# Patient Record
Sex: FEMALE | Race: WHITE | NOT HISPANIC OR LATINO | Employment: OTHER | URBAN - METROPOLITAN AREA
[De-identification: names, ages, dates, MRNs, and addresses within clinical notes are randomized per-mention and may not be internally consistent; named-entity substitution may affect disease eponyms.]

---

## 2017-05-08 ENCOUNTER — RECORDS - HEALTHEAST (OUTPATIENT)
Dept: LAB | Facility: CLINIC | Age: 77
End: 2017-05-08

## 2017-05-08 LAB
CHOLEST SERPL-MCNC: 159 MG/DL
FASTING STATUS PATIENT QL REPORTED: YES
HDLC SERPL-MCNC: 37 MG/DL
LDLC SERPL CALC-MCNC: 103 MG/DL
TRIGL SERPL-MCNC: 96 MG/DL

## 2018-07-02 ENCOUNTER — RECORDS - HEALTHEAST (OUTPATIENT)
Dept: LAB | Facility: CLINIC | Age: 78
End: 2018-07-02

## 2018-07-02 LAB
ALBUMIN SERPL-MCNC: 3.7 G/DL (ref 3.5–5)
ALP SERPL-CCNC: 57 U/L (ref 45–120)
ALT SERPL W P-5'-P-CCNC: 14 U/L (ref 0–45)
ANION GAP SERPL CALCULATED.3IONS-SCNC: 9 MMOL/L (ref 5–18)
AST SERPL W P-5'-P-CCNC: 20 U/L (ref 0–40)
BILIRUB SERPL-MCNC: 0.8 MG/DL (ref 0–1)
BUN SERPL-MCNC: 17 MG/DL (ref 8–28)
CALCIUM SERPL-MCNC: 9.2 MG/DL (ref 8.5–10.5)
CHLORIDE BLD-SCNC: 108 MMOL/L (ref 98–107)
CHOLEST SERPL-MCNC: 177 MG/DL
CO2 SERPL-SCNC: 25 MMOL/L (ref 22–31)
CREAT SERPL-MCNC: 0.77 MG/DL (ref 0.6–1.1)
FASTING STATUS PATIENT QL REPORTED: NORMAL
GFR SERPL CREATININE-BSD FRML MDRD: >60 ML/MIN/1.73M2
GLUCOSE BLD-MCNC: 90 MG/DL (ref 70–125)
HDLC SERPL-MCNC: 51 MG/DL
LDLC SERPL CALC-MCNC: 108 MG/DL
POTASSIUM BLD-SCNC: 3.9 MMOL/L (ref 3.5–5)
PROT SERPL-MCNC: 6.3 G/DL (ref 6–8)
SODIUM SERPL-SCNC: 142 MMOL/L (ref 136–145)
TRIGL SERPL-MCNC: 91 MG/DL
TSH SERPL DL<=0.005 MIU/L-ACNC: 0.97 UIU/ML (ref 0.3–5)

## 2018-07-03 LAB — 25(OH)D3 SERPL-MCNC: 20.7 NG/ML (ref 30–80)

## 2019-09-03 ENCOUNTER — RECORDS - HEALTHEAST (OUTPATIENT)
Dept: LAB | Facility: CLINIC | Age: 79
End: 2019-09-03

## 2019-09-03 LAB
ALBUMIN SERPL-MCNC: 3.7 G/DL (ref 3.5–5)
ALP SERPL-CCNC: 59 U/L (ref 45–120)
ALT SERPL W P-5'-P-CCNC: 15 U/L (ref 0–45)
ANION GAP SERPL CALCULATED.3IONS-SCNC: 8 MMOL/L (ref 5–18)
AST SERPL W P-5'-P-CCNC: 17 U/L (ref 0–40)
BILIRUB SERPL-MCNC: 0.8 MG/DL (ref 0–1)
BUN SERPL-MCNC: 20 MG/DL (ref 8–28)
CALCIUM SERPL-MCNC: 9.4 MG/DL (ref 8.5–10.5)
CHLORIDE BLD-SCNC: 107 MMOL/L (ref 98–107)
CHOLEST SERPL-MCNC: 186 MG/DL
CO2 SERPL-SCNC: 26 MMOL/L (ref 22–31)
CREAT SERPL-MCNC: 0.83 MG/DL (ref 0.6–1.1)
FASTING STATUS PATIENT QL REPORTED: YES
GFR SERPL CREATININE-BSD FRML MDRD: >60 ML/MIN/1.73M2
GLUCOSE BLD-MCNC: 93 MG/DL (ref 70–125)
HDLC SERPL-MCNC: 62 MG/DL
LDLC SERPL CALC-MCNC: 108 MG/DL
POTASSIUM BLD-SCNC: 4.3 MMOL/L (ref 3.5–5)
PROT SERPL-MCNC: 6.4 G/DL (ref 6–8)
SODIUM SERPL-SCNC: 141 MMOL/L (ref 136–145)
TRIGL SERPL-MCNC: 80 MG/DL
TSH SERPL DL<=0.005 MIU/L-ACNC: 0.7 UIU/ML (ref 0.3–5)

## 2019-09-04 LAB — 25(OH)D3 SERPL-MCNC: 20.4 NG/ML (ref 30–80)

## 2020-09-11 ENCOUNTER — RECORDS - HEALTHEAST (OUTPATIENT)
Dept: LAB | Facility: CLINIC | Age: 80
End: 2020-09-11

## 2020-09-11 LAB
ALBUMIN SERPL-MCNC: 3.7 G/DL (ref 3.5–5)
ALP SERPL-CCNC: 57 U/L (ref 45–120)
ALT SERPL W P-5'-P-CCNC: 17 U/L (ref 0–45)
ANION GAP SERPL CALCULATED.3IONS-SCNC: 12 MMOL/L (ref 5–18)
AST SERPL W P-5'-P-CCNC: 23 U/L (ref 0–40)
BILIRUB SERPL-MCNC: 0.8 MG/DL (ref 0–1)
BUN SERPL-MCNC: 18 MG/DL (ref 8–28)
CALCIUM SERPL-MCNC: 9.2 MG/DL (ref 8.5–10.5)
CHLORIDE BLD-SCNC: 107 MMOL/L (ref 98–107)
CHOLEST SERPL-MCNC: 182 MG/DL
CO2 SERPL-SCNC: 21 MMOL/L (ref 22–31)
CREAT SERPL-MCNC: 0.79 MG/DL (ref 0.6–1.1)
FASTING STATUS PATIENT QL REPORTED: NORMAL
GFR SERPL CREATININE-BSD FRML MDRD: >60 ML/MIN/1.73M2
GLUCOSE BLD-MCNC: 89 MG/DL (ref 70–125)
HDLC SERPL-MCNC: 57 MG/DL
LDLC SERPL CALC-MCNC: 107 MG/DL
POTASSIUM BLD-SCNC: 4.1 MMOL/L (ref 3.5–5)
PROT SERPL-MCNC: 6.7 G/DL (ref 6–8)
SODIUM SERPL-SCNC: 140 MMOL/L (ref 136–145)
TRIGL SERPL-MCNC: 91 MG/DL
TSH SERPL DL<=0.005 MIU/L-ACNC: 0.75 UIU/ML (ref 0.3–5)

## 2020-09-14 LAB — 25(OH)D3 SERPL-MCNC: 20.7 NG/ML (ref 30–80)

## 2021-05-25 ENCOUNTER — RECORDS - HEALTHEAST (OUTPATIENT)
Dept: ADMINISTRATIVE | Facility: CLINIC | Age: 81
End: 2021-05-25

## 2021-05-28 ENCOUNTER — RECORDS - HEALTHEAST (OUTPATIENT)
Dept: ADMINISTRATIVE | Facility: CLINIC | Age: 81
End: 2021-05-28

## 2021-05-30 ENCOUNTER — RECORDS - HEALTHEAST (OUTPATIENT)
Dept: ADMINISTRATIVE | Facility: CLINIC | Age: 81
End: 2021-05-30

## 2021-06-01 ENCOUNTER — RECORDS - HEALTHEAST (OUTPATIENT)
Dept: ADMINISTRATIVE | Facility: CLINIC | Age: 81
End: 2021-06-01

## 2021-09-02 ENCOUNTER — APPOINTMENT (OUTPATIENT)
Dept: MRI IMAGING | Facility: HOSPITAL | Age: 81
End: 2021-09-02
Attending: EMERGENCY MEDICINE
Payer: COMMERCIAL

## 2021-09-02 ENCOUNTER — APPOINTMENT (OUTPATIENT)
Dept: RADIOLOGY | Facility: HOSPITAL | Age: 81
End: 2021-09-02
Attending: EMERGENCY MEDICINE
Payer: COMMERCIAL

## 2021-09-02 ENCOUNTER — HOSPITAL ENCOUNTER (OUTPATIENT)
Facility: HOSPITAL | Age: 81
Setting detail: OBSERVATION
Discharge: HOME | End: 2021-09-03
Attending: EMERGENCY MEDICINE | Admitting: EMERGENCY MEDICINE
Payer: COMMERCIAL

## 2021-09-02 DIAGNOSIS — R29.898 LEFT LEG WEAKNESS: ICD-10-CM

## 2021-09-02 DIAGNOSIS — C90.00 MULTIPLE MYELOMA NOT HAVING ACHIEVED REMISSION (H): ICD-10-CM

## 2021-09-02 DIAGNOSIS — R52 INTRACTABLE PAIN: ICD-10-CM

## 2021-09-02 LAB
ALBUMIN SERPL-MCNC: 2.9 G/DL (ref 3.5–5)
ALBUMIN UR-MCNC: 10 MG/DL
ALP SERPL-CCNC: 98 U/L (ref 45–120)
ALT SERPL W P-5'-P-CCNC: 27 U/L (ref 0–45)
ANION GAP SERPL CALCULATED.3IONS-SCNC: 6 MMOL/L (ref 5–18)
APPEARANCE UR: CLEAR
APTT PPP: 30 SECONDS (ref 22–38)
AST SERPL W P-5'-P-CCNC: 35 U/L (ref 0–40)
BASOPHILS # BLD AUTO: 0 10E3/UL (ref 0–0.2)
BASOPHILS NFR BLD AUTO: 0 %
BILIRUB SERPL-MCNC: 0.4 MG/DL (ref 0–1)
BILIRUB UR QL STRIP: NEGATIVE
BNP SERPL-MCNC: 121 PG/ML (ref 0–155)
BUN SERPL-MCNC: 13 MG/DL (ref 8–28)
CALCIUM SERPL-MCNC: 10.4 MG/DL (ref 8.5–10.5)
CHLORIDE BLD-SCNC: 106 MMOL/L (ref 98–107)
CO2 SERPL-SCNC: 23 MMOL/L (ref 22–31)
COLOR UR AUTO: ABNORMAL
CREAT SERPL-MCNC: 0.74 MG/DL (ref 0.6–1.1)
EOSINOPHIL # BLD AUTO: 0 10E3/UL (ref 0–0.7)
EOSINOPHIL NFR BLD AUTO: 0 %
ERYTHROCYTE [DISTWIDTH] IN BLOOD BY AUTOMATED COUNT: 14.4 % (ref 10–15)
GFR SERPL CREATININE-BSD FRML MDRD: 77 ML/MIN/1.73M2
GLUCOSE BLD-MCNC: 141 MG/DL (ref 70–125)
GLUCOSE UR STRIP-MCNC: NEGATIVE MG/DL
HCT VFR BLD AUTO: 32.8 % (ref 35–47)
HGB BLD-MCNC: 10.9 G/DL (ref 11.7–15.7)
HGB UR QL STRIP: ABNORMAL
IMM GRANULOCYTES # BLD: 0 10E3/UL
IMM GRANULOCYTES NFR BLD: 1 %
INR PPP: 1.34 (ref 0.85–1.15)
KETONES UR STRIP-MCNC: 10 MG/DL
LACTATE SERPL-SCNC: 1.3 MMOL/L (ref 0.7–2)
LEUKOCYTE ESTERASE UR QL STRIP: NEGATIVE
LYMPHOCYTES # BLD AUTO: 0.3 10E3/UL (ref 0.8–5.3)
LYMPHOCYTES NFR BLD AUTO: 6 %
MCH RBC QN AUTO: 33.5 PG (ref 26.5–33)
MCHC RBC AUTO-ENTMCNC: 33.2 G/DL (ref 31.5–36.5)
MCV RBC AUTO: 101 FL (ref 78–100)
MONOCYTES # BLD AUTO: 0.2 10E3/UL (ref 0–1.3)
MONOCYTES NFR BLD AUTO: 5 %
MUCOUS THREADS #/AREA URNS LPF: PRESENT /LPF
NEUTROPHILS # BLD AUTO: 3.8 10E3/UL (ref 1.6–8.3)
NEUTROPHILS NFR BLD AUTO: 88 %
NITRATE UR QL: NEGATIVE
NRBC # BLD AUTO: 0 10E3/UL
NRBC BLD AUTO-RTO: 0 /100
PH UR STRIP: 7 [PH] (ref 5–7)
PLATELET # BLD AUTO: 104 10E3/UL (ref 150–450)
POTASSIUM BLD-SCNC: 3.2 MMOL/L (ref 3.5–5)
PROT SERPL-MCNC: 8.8 G/DL (ref 6–8)
RBC # BLD AUTO: 3.25 10E6/UL (ref 3.8–5.2)
RBC URINE: 16 /HPF
SARS-COV-2 RNA RESP QL NAA+PROBE: NEGATIVE
SODIUM SERPL-SCNC: 135 MMOL/L (ref 136–145)
SP GR UR STRIP: 1.01 (ref 1–1.03)
SQUAMOUS EPITHELIAL: <1 /HPF
TROPONIN I SERPL-MCNC: 0.03 NG/ML (ref 0–0.29)
UROBILINOGEN UR STRIP-MCNC: <2 MG/DL
WBC # BLD AUTO: 4.3 10E3/UL (ref 4–11)
WBC URINE: 4 /HPF

## 2021-09-02 PROCEDURE — 72148 MRI LUMBAR SPINE W/O DYE: CPT

## 2021-09-02 PROCEDURE — 99285 EMERGENCY DEPT VISIT HI MDM: CPT | Mod: 25

## 2021-09-02 PROCEDURE — 84484 ASSAY OF TROPONIN QUANT: CPT | Performed by: EMERGENCY MEDICINE

## 2021-09-02 PROCEDURE — G0378 HOSPITAL OBSERVATION PER HR: HCPCS

## 2021-09-02 PROCEDURE — 85610 PROTHROMBIN TIME: CPT | Performed by: EMERGENCY MEDICINE

## 2021-09-02 PROCEDURE — 83605 ASSAY OF LACTIC ACID: CPT | Performed by: EMERGENCY MEDICINE

## 2021-09-02 PROCEDURE — 81001 URINALYSIS AUTO W/SCOPE: CPT | Performed by: EMERGENCY MEDICINE

## 2021-09-02 PROCEDURE — 87635 SARS-COV-2 COVID-19 AMP PRB: CPT | Performed by: EMERGENCY MEDICINE

## 2021-09-02 PROCEDURE — 36415 COLL VENOUS BLD VENIPUNCTURE: CPT | Performed by: EMERGENCY MEDICINE

## 2021-09-02 PROCEDURE — 80053 COMPREHEN METABOLIC PANEL: CPT | Performed by: EMERGENCY MEDICINE

## 2021-09-02 PROCEDURE — 71045 X-RAY EXAM CHEST 1 VIEW: CPT

## 2021-09-02 PROCEDURE — 85025 COMPLETE CBC W/AUTO DIFF WBC: CPT | Performed by: EMERGENCY MEDICINE

## 2021-09-02 PROCEDURE — 83735 ASSAY OF MAGNESIUM: CPT | Performed by: INTERNAL MEDICINE

## 2021-09-02 PROCEDURE — 83880 ASSAY OF NATRIURETIC PEPTIDE: CPT | Performed by: EMERGENCY MEDICINE

## 2021-09-02 PROCEDURE — 85730 THROMBOPLASTIN TIME PARTIAL: CPT | Performed by: EMERGENCY MEDICINE

## 2021-09-02 PROCEDURE — 93005 ELECTROCARDIOGRAM TRACING: CPT | Performed by: EMERGENCY MEDICINE

## 2021-09-02 PROCEDURE — C9803 HOPD COVID-19 SPEC COLLECT: HCPCS

## 2021-09-02 PROCEDURE — 99220 PR INITIAL OBSERVATION CARE,LEVEL III: CPT | Performed by: INTERNAL MEDICINE

## 2021-09-02 PROCEDURE — 258N000003 HC RX IP 258 OP 636: Performed by: INTERNAL MEDICINE

## 2021-09-02 PROCEDURE — 258N000003 HC RX IP 258 OP 636: Performed by: EMERGENCY MEDICINE

## 2021-09-02 RX ORDER — ONDANSETRON 2 MG/ML
4 INJECTION INTRAMUSCULAR; INTRAVENOUS EVERY 6 HOURS PRN
Status: DISCONTINUED | OUTPATIENT
Start: 2021-09-02 | End: 2021-09-03 | Stop reason: HOSPADM

## 2021-09-02 RX ORDER — BRIMONIDINE TARTRATE 2 MG/ML
1 SOLUTION/ DROPS OPHTHALMIC 2 TIMES DAILY
Status: DISCONTINUED | OUTPATIENT
Start: 2021-09-03 | End: 2021-09-03

## 2021-09-02 RX ORDER — ONDANSETRON 4 MG/1
4 TABLET, ORALLY DISINTEGRATING ORAL EVERY 6 HOURS PRN
Status: DISCONTINUED | OUTPATIENT
Start: 2021-09-02 | End: 2021-09-03 | Stop reason: HOSPADM

## 2021-09-02 RX ORDER — ACETAMINOPHEN 650 MG/1
650 SUPPOSITORY RECTAL EVERY 6 HOURS PRN
Status: DISCONTINUED | OUTPATIENT
Start: 2021-09-02 | End: 2021-09-03 | Stop reason: HOSPADM

## 2021-09-02 RX ORDER — ACETAMINOPHEN 325 MG/1
650 TABLET ORAL EVERY 6 HOURS PRN
Status: DISCONTINUED | OUTPATIENT
Start: 2021-09-02 | End: 2021-09-03 | Stop reason: HOSPADM

## 2021-09-02 RX ORDER — NALOXONE HYDROCHLORIDE 0.4 MG/ML
0.2 INJECTION, SOLUTION INTRAMUSCULAR; INTRAVENOUS; SUBCUTANEOUS
Status: DISCONTINUED | OUTPATIENT
Start: 2021-09-02 | End: 2021-09-03 | Stop reason: HOSPADM

## 2021-09-02 RX ORDER — CHOLESTYRAMINE 4 G/9G
1 POWDER, FOR SUSPENSION ORAL 2 TIMES DAILY
Status: DISCONTINUED | OUTPATIENT
Start: 2021-09-03 | End: 2021-09-03 | Stop reason: HOSPADM

## 2021-09-02 RX ORDER — NALOXONE HYDROCHLORIDE 0.4 MG/ML
0.4 INJECTION, SOLUTION INTRAMUSCULAR; INTRAVENOUS; SUBCUTANEOUS
Status: DISCONTINUED | OUTPATIENT
Start: 2021-09-02 | End: 2021-09-03 | Stop reason: HOSPADM

## 2021-09-02 RX ORDER — AMOXICILLIN 250 MG
1 CAPSULE ORAL 2 TIMES DAILY PRN
Status: DISCONTINUED | OUTPATIENT
Start: 2021-09-02 | End: 2021-09-03 | Stop reason: HOSPADM

## 2021-09-02 RX ORDER — VITAMIN B COMPLEX
1000 TABLET ORAL DAILY
Status: DISCONTINUED | OUTPATIENT
Start: 2021-09-03 | End: 2021-09-03 | Stop reason: HOSPADM

## 2021-09-02 RX ORDER — DIPHENOXYLATE HCL/ATROPINE 2.5-.025MG
1 TABLET ORAL 4 TIMES DAILY PRN
COMMUNITY
End: 2022-01-17

## 2021-09-02 RX ORDER — GABAPENTIN 300 MG/1
300 CAPSULE ORAL 2 TIMES DAILY
Status: DISCONTINUED | OUTPATIENT
Start: 2021-09-02 | End: 2021-09-03 | Stop reason: HOSPADM

## 2021-09-02 RX ORDER — LOPERAMIDE HCL 2 MG
2 CAPSULE ORAL DAILY PRN
Status: DISCONTINUED | OUTPATIENT
Start: 2021-09-02 | End: 2021-09-03 | Stop reason: HOSPADM

## 2021-09-02 RX ORDER — AMOXICILLIN 250 MG
2 CAPSULE ORAL 2 TIMES DAILY PRN
Status: DISCONTINUED | OUTPATIENT
Start: 2021-09-02 | End: 2021-09-03 | Stop reason: HOSPADM

## 2021-09-02 RX ORDER — DORZOLAMIDE HYDROCHLORIDE AND TIMOLOL MALEATE 20; 5 MG/ML; MG/ML
1 SOLUTION/ DROPS OPHTHALMIC 2 TIMES DAILY
COMMUNITY

## 2021-09-02 RX ORDER — SODIUM CHLORIDE 9 MG/ML
INJECTION, SOLUTION INTRAVENOUS CONTINUOUS
Status: DISCONTINUED | OUTPATIENT
Start: 2021-09-02 | End: 2021-09-03 | Stop reason: ALTCHOICE

## 2021-09-02 RX ORDER — CHOLESTYRAMINE LIGHT 4 G/5.7G
1 POWDER, FOR SUSPENSION ORAL
Status: DISCONTINUED | OUTPATIENT
Start: 2021-09-02 | End: 2021-09-02 | Stop reason: CLARIF

## 2021-09-02 RX ORDER — FENTANYL CITRATE 50 UG/ML
50 INJECTION, SOLUTION INTRAMUSCULAR; INTRAVENOUS ONCE
Status: DISCONTINUED | OUTPATIENT
Start: 2021-09-02 | End: 2021-09-02

## 2021-09-02 RX ORDER — TRAMADOL HYDROCHLORIDE 50 MG/1
50 TABLET ORAL AT BEDTIME
Status: DISCONTINUED | OUTPATIENT
Start: 2021-09-02 | End: 2021-09-03 | Stop reason: HOSPADM

## 2021-09-02 RX ORDER — ASPIRIN 81 MG/1
81 TABLET, CHEWABLE ORAL AT BEDTIME
Status: DISCONTINUED | OUTPATIENT
Start: 2021-09-02 | End: 2021-09-03 | Stop reason: HOSPADM

## 2021-09-02 RX ORDER — LEVOTHYROXINE SODIUM 100 UG/1
100 TABLET ORAL
Status: DISCONTINUED | OUTPATIENT
Start: 2021-09-03 | End: 2021-09-03 | Stop reason: HOSPADM

## 2021-09-02 RX ORDER — DORZOLAMIDE HYDROCHLORIDE AND TIMOLOL MALEATE 20; 5 MG/ML; MG/ML
1 SOLUTION/ DROPS OPHTHALMIC 2 TIMES DAILY
Status: DISCONTINUED | OUTPATIENT
Start: 2021-09-03 | End: 2021-09-03 | Stop reason: HOSPADM

## 2021-09-02 RX ORDER — LOPERAMIDE HCL 2 MG
2 CAPSULE ORAL 4 TIMES DAILY PRN
COMMUNITY

## 2021-09-02 RX ORDER — LOPERAMIDE HCL 2 MG
2 CAPSULE ORAL EVERY MORNING
COMMUNITY
End: 2022-01-17

## 2021-09-02 RX ORDER — ACETAMINOPHEN 325 MG/1
650 TABLET ORAL 3 TIMES DAILY
Status: DISCONTINUED | OUTPATIENT
Start: 2021-09-03 | End: 2021-09-03 | Stop reason: HOSPADM

## 2021-09-02 RX ORDER — ACETAMINOPHEN 325 MG/1
650 TABLET ORAL EVERY 8 HOURS PRN
Status: ON HOLD | COMMUNITY
End: 2022-02-19

## 2021-09-02 RX ORDER — POTASSIUM CHLORIDE 750 MG/1
10 TABLET, EXTENDED RELEASE ORAL
Status: DISCONTINUED | OUTPATIENT
Start: 2021-09-03 | End: 2021-09-03 | Stop reason: HOSPADM

## 2021-09-02 RX ORDER — DIPHENOXYLATE HCL/ATROPINE 2.5-.025MG
1 TABLET ORAL 4 TIMES DAILY PRN
Status: DISCONTINUED | OUTPATIENT
Start: 2021-09-02 | End: 2021-09-03 | Stop reason: HOSPADM

## 2021-09-02 RX ADMIN — SODIUM CHLORIDE: 9 INJECTION, SOLUTION INTRAVENOUS at 19:58

## 2021-09-02 RX ADMIN — SODIUM CHLORIDE: 9 INJECTION, SOLUTION INTRAVENOUS at 23:25

## 2021-09-02 ASSESSMENT — ENCOUNTER SYMPTOMS
SHORTNESS OF BREATH: 0
FEVER: 0
NECK STIFFNESS: 0
DIFFICULTY URINATING: 0
ARTHRALGIAS: 0
BACK PAIN: 1
EYE REDNESS: 0
CONFUSION: 0
HEADACHES: 0
ABDOMINAL PAIN: 0
COLOR CHANGE: 0

## 2021-09-02 ASSESSMENT — MIFFLIN-ST. JEOR: SCORE: 999.32

## 2021-09-02 NOTE — ED TRIAGE NOTES
Patient is having weakness and pain in her lower back and left leg.  She has recently restarted Chemo for multiple myeloma. She was for to have oxygen saturations of 85% on room air and she was given 2 liters nasal canula.  Temp.at 100.00 's.  Patient is blind. Arrival via EMS [facility]..   .

## 2021-09-03 VITALS
TEMPERATURE: 97.6 F | DIASTOLIC BLOOD PRESSURE: 57 MMHG | HEIGHT: 64 IN | SYSTOLIC BLOOD PRESSURE: 93 MMHG | WEIGHT: 120 LBS | HEART RATE: 68 BPM | RESPIRATION RATE: 16 BRPM | BODY MASS INDEX: 20.49 KG/M2 | OXYGEN SATURATION: 95 %

## 2021-09-03 LAB
ATRIAL RATE - MUSE: 106 BPM
CREAT SERPL-MCNC: 0.66 MG/DL (ref 0.6–1.1)
DIASTOLIC BLOOD PRESSURE - MUSE: NORMAL MMHG
GFR SERPL CREATININE-BSD FRML MDRD: 84 ML/MIN/1.73M2
INTERPRETATION ECG - MUSE: NORMAL
MAGNESIUM SERPL-MCNC: 1.6 MG/DL (ref 1.8–2.6)
P AXIS - MUSE: 59 DEGREES
POTASSIUM BLD-SCNC: 3.4 MMOL/L (ref 3.5–5)
POTASSIUM BLD-SCNC: 4.3 MMOL/L (ref 3.5–5)
PR INTERVAL - MUSE: 162 MS
QRS DURATION - MUSE: 120 MS
QT - MUSE: 336 MS
QTC - MUSE: 446 MS
R AXIS - MUSE: 99 DEGREES
SYSTOLIC BLOOD PRESSURE - MUSE: NORMAL MMHG
T AXIS - MUSE: 32 DEGREES
VENTRICULAR RATE- MUSE: 106 BPM

## 2021-09-03 PROCEDURE — 84132 ASSAY OF SERUM POTASSIUM: CPT | Performed by: INTERNAL MEDICINE

## 2021-09-03 PROCEDURE — 258N000003 HC RX IP 258 OP 636: Performed by: INTERNAL MEDICINE

## 2021-09-03 PROCEDURE — 84132 ASSAY OF SERUM POTASSIUM: CPT | Mod: 91 | Performed by: INTERNAL MEDICINE

## 2021-09-03 PROCEDURE — 250N000011 HC RX IP 250 OP 636: Performed by: INTERNAL MEDICINE

## 2021-09-03 PROCEDURE — 250N000013 HC RX MED GY IP 250 OP 250 PS 637: Performed by: INTERNAL MEDICINE

## 2021-09-03 PROCEDURE — G0378 HOSPITAL OBSERVATION PER HR: HCPCS

## 2021-09-03 PROCEDURE — 36415 COLL VENOUS BLD VENIPUNCTURE: CPT | Performed by: INTERNAL MEDICINE

## 2021-09-03 PROCEDURE — 99217 PR OBSERVATION CARE DISCHARGE: CPT | Performed by: INTERNAL MEDICINE

## 2021-09-03 PROCEDURE — 82565 ASSAY OF CREATININE: CPT | Performed by: INTERNAL MEDICINE

## 2021-09-03 PROCEDURE — 96372 THER/PROPH/DIAG INJ SC/IM: CPT | Performed by: INTERNAL MEDICINE

## 2021-09-03 RX ORDER — POTASSIUM CHLORIDE 1500 MG/1
20 TABLET, EXTENDED RELEASE ORAL ONCE
Status: COMPLETED | OUTPATIENT
Start: 2021-09-03 | End: 2021-09-03

## 2021-09-03 RX ORDER — BRIMONIDINE TARTRATE 1 MG/ML
1 SOLUTION/ DROPS OPHTHALMIC 2 TIMES DAILY
Status: DISCONTINUED | OUTPATIENT
Start: 2021-09-03 | End: 2021-09-03 | Stop reason: HOSPADM

## 2021-09-03 RX ORDER — TRAMADOL HYDROCHLORIDE 50 MG/1
25 TABLET ORAL 2 TIMES DAILY PRN
Qty: 10 TABLET | Refills: 0 | Status: SHIPPED | OUTPATIENT
Start: 2021-09-03 | End: 2022-01-17

## 2021-09-03 RX ORDER — POTASSIUM CHLORIDE 1.5 G/1.58G
20 POWDER, FOR SOLUTION ORAL ONCE
Status: COMPLETED | OUTPATIENT
Start: 2021-09-03 | End: 2021-09-03

## 2021-09-03 RX ADMIN — CHOLESTYRAMINE 4 G: 4 POWDER, FOR SUSPENSION ORAL at 10:03

## 2021-09-03 RX ADMIN — ENOXAPARIN SODIUM 30 MG: 30 INJECTION SUBCUTANEOUS at 03:18

## 2021-09-03 RX ADMIN — TRAMADOL HYDROCHLORIDE 50 MG: 50 TABLET, FILM COATED ORAL at 00:09

## 2021-09-03 RX ADMIN — POTASSIUM CHLORIDE FOR ORAL SOLUTION 20 MEQ: 1.5 POWDER, FOR SOLUTION ORAL at 10:03

## 2021-09-03 RX ADMIN — BRIMONIDINE TARTRATE 1 DROP: 1 SOLUTION/ DROPS OPHTHALMIC at 13:16

## 2021-09-03 RX ADMIN — ACETAMINOPHEN 650 MG: 325 TABLET ORAL at 10:03

## 2021-09-03 RX ADMIN — POTASSIUM CHLORIDE 10 MEQ: 750 TABLET, EXTENDED RELEASE ORAL at 10:03

## 2021-09-03 RX ADMIN — ACETAMINOPHEN 650 MG: 325 TABLET ORAL at 00:08

## 2021-09-03 RX ADMIN — Medication 400 UNITS: at 10:03

## 2021-09-03 RX ADMIN — DIPHENOXYLATE HYDROCHLORIDE AND ATROPINE SULFATE 1 TABLET: 2.5; .025 TABLET ORAL at 10:37

## 2021-09-03 RX ADMIN — POTASSIUM CHLORIDE 20 MEQ: 1500 TABLET, EXTENDED RELEASE ORAL at 03:13

## 2021-09-03 RX ADMIN — LEVOTHYROXINE SODIUM 100 MCG: 0.1 TABLET ORAL at 06:42

## 2021-09-03 RX ADMIN — ASPIRIN 81 MG: 81 TABLET, CHEWABLE ORAL at 00:08

## 2021-09-03 RX ADMIN — SODIUM CHLORIDE: 9 INJECTION, SOLUTION INTRAVENOUS at 11:49

## 2021-09-03 RX ADMIN — Medication 1000 UNITS: at 10:11

## 2021-09-03 RX ADMIN — GABAPENTIN 300 MG: 300 CAPSULE ORAL at 10:03

## 2021-09-03 RX ADMIN — GABAPENTIN 300 MG: 300 CAPSULE ORAL at 00:09

## 2021-09-03 RX ADMIN — ACETAMINOPHEN 650 MG: 325 TABLET ORAL at 14:48

## 2021-09-03 ASSESSMENT — ACTIVITIES OF DAILY LIVING (ADL): DEPENDENT_IADLS:: INDEPENDENT

## 2021-09-03 NOTE — ED NOTES
"[facility] ED Handoff Report    ED Chief Complaint:  chief complaint    ED Diagnosis:  (C90.00) Multiple myeloma not having achieved remission (H)  Comment:   Plan:     (R29.898) Left leg weakness  Comment:   Plan:     (R52) Intractable pain  Comment:   Plan:        PMH:  History reviewed. No pertinent past medical history.     Code Status:  No Order     Falls Risk: Yes- last fall in april    Current Living Situation/Residence: house  With sister    Elimination Status:  Continent - short warning before she has to go.     Activity Level:  Standby with 1 assist and walker- pt is legally blind    Patients Preferred Language:  English     Needed: No    Infection:  [unfilled]     Vital Signs:  BP 99/67   Pulse 88   Temp 100  F (37.8  C) (Oral)   Resp 16   Ht 1.626 m (5' 4\")   Wt 54.4 kg (120 lb)   SpO2 94%   BMI 20.60 kg/m       Cardiac Rhythm: n/a    Pain Score:  1/10    Is the Patient Confused:  No    Last Food or Drink: drinking water in ER. Had boost at 1530    Focused Assessment:  Pt c/o left leg weakness.     Tests Performed:  Xray, labs, mri    Abnormal Results:    Abnormal Labs Reviewed   INR - Abnormal; Notable for the following components:       Result Value    INR 1.34 (*)     All other components within normal limits   COMPREHENSIVE METABOLIC PANEL - Abnormal; Notable for the following components:    Sodium 135 (*)     Potassium 3.2 (*)     Glucose 141 (*)     Protein Total 8.8 (*)     Albumin 2.9 (*)     All other components within normal limits   ROUTINE UA WITH MICROSCOPIC REFLEX TO CULTURE - Abnormal; Notable for the following components:    Ketones Urine 10  (*)     Blood Urine 0.2 mg/dL (*)     Protein Albumin Urine 10  (*)     Mucus Urine Present (*)     RBC Urine 16 (*)     All other components within normal limits    Narrative:     Urine Culture not indicated   CBC WITH PLATELETS AND DIFFERENTIAL - Abnormal; Notable for the following components:    RBC Count 3.25 (*)     " Hemoglobin 10.9 (*)     Hematocrit 32.8 (*)      (*)     MCH 33.5 (*)     Platelet Count 104 (*)     Absolute Lymphocytes 0.3 (*)     All other components within normal limits       XR Chest Port 1 View   Final Result   IMPRESSION: Heart size is normal. Vague focus of airspace opacification in the right infrahilar region could reflect atelectasis or a developing infiltrate. Lungs otherwise clear. No visible pneumothorax.      MR Lumbar Spine w/o Contrast   Final Result   IMPRESSION:   1.  Slight progression of sacral marrow replacing process with encroachment upon the mid sacral plexus. There is also interval development of an adjacent pelvic soft tissue mass which is incompletely visualized seen on sagittal images 4-12 of series 5    and 6.      2.  Thoracic and lumbar compression fractures with progression since the prior study. Varying ages. No evidence for thoracic or lumbar epidural soft tissue mass or paraspinous tissue.      3.  Moderate to severe, multilevel lumbar spondylosis.      4.  Focal marrow replacing lesion within the left femoral head.      XR Chest 2 Views    (Results Pending)        Treatments Provided:  See MAR    Family Dynamics/Concerns: no    Family Updated On Visitor Policy: Yes    Plan of Care Communicated to Family: Yes    Who Was Updated about Plan of Care: son    Belongings Checklist Done and Signed by Patient: Yes    covid- came back negative    ED Medications:    Medications   sodium chloride 0.9% infusion ( Intravenous New Bag 9/2/21 1958)   fentaNYL (PF) (SUBLIMAZE) injection 50 mcg (0 mcg Intravenous Hold 9/2/21 2040)        Additional Information: n/america Hawley RN 9/2/2021 10:30 PM

## 2021-09-03 NOTE — DISCHARGE SUMMARY
"[facility] MEDICINE  DISCHARGE SUMMARY     Primary Care Physician: Roland Henriquez  Admission Date: 9/2/2021   Discharge Provider: Megan Hernandez MD Discharge Date: 9/3/2021   Diet:   Active Diet and Nourishment Order   Procedures     Advance Diet as Tolerated: Regular Diet Adult     Diet       Code Status: Full Code   Activity: DCACTIVITY: Activity as tolerated        Condition at Discharge: Stable     REASON FOR PRESENTATION(See Admission Note for Details)   Leg weakness    PRINCIPAL & ACTIVE DISCHARGE DIAGNOSES     Active Problems:    Multiple myeloma (H)    Left leg weakness    Intractable pain      PENDING LABS     Unresulted Labs Ordered in the Past 30 Days of this Admission     No orders found for last 31 day(s).            PROCEDURES ( this hospitalization only)          RECOMMENDATIONS TO OUTPATIENT PROVIDER FOR F/U VISIT     Follow-up Appointments     Follow-up and recommended labs and tests       Oncology arranged follow-up with them next week for further management                 DISPOSITION     Home    SUMMARY OF HOSPITAL COURSE:      Amelia Fry is a 81 year old female presents with      Back pain and leg pain  Left leg weakness  -MRI showing \"Slight progression of sacral marrow replacing process with encroachment upon the mid sacral plexus.\"  -MRI showing \"Thoracic and lumbar compression fractures with progression\"  -\"Focal marrow replacing lesion within the left femoral head.\"  -pain controlled, did prescribe additional tramadol 25mg BID prn   - continue tramadol 50mg daily  - patient declined, pain team, PT/Ot consults      MM  -recent radiation and chemotherapy  -oncology consultation  - f/u with oncology this week      Hypokalemia  - replaced      Hyponatremia  -ns    Discharge Medications with Med changes:     Current Discharge Medication List      START taking these medications    Details   !! traMADol (ULTRAM) 50 MG tablet Take 0.5 tablets " (25 mg) by mouth 2 times daily as needed for breakthrough pain  Qty: 10 tablet, Refills: 0    Associated Diagnoses: Multiple myeloma not having achieved remission (H); Intractable pain       !! - Potential duplicate medications found. Please discuss with provider.      CONTINUE these medications which have NOT CHANGED    Details   acetaminophen (TYLENOL) 325 MG tablet Take 650 mg by mouth 3 times daily      aspirin 81 mg chewable tablet Take 81 mg by mouth At Bedtime       brimonidine (ALPHAGAN P) 0.1 % ophthalmic solution Place 1 drop into the right eye 2 times daily       cholecalciferol 25 MCG (1000 UT) TABS Take 1,000 Units by mouth daily      cholestyramine-aspartame, sugar free, (CHOLESTYRAMINE LIGHT) 4 gram PwPk [CHOLESTYRAMINE-ASPARTAME, SUGAR FREE, (CHOLESTYRAMINE LIGHT) 4 GRAM PWPK] Take 1 packet by mouth daily after supper.      diphenoxylate-atropine (LOMOTIL) 2.5-0.025 MG tablet Take 1 tablet by mouth 4 times daily as needed for diarrhea For persistent diarrhea not controlled with Lomotil      dorzolamide-timolol (COSOPT) 2-0.5 % ophthalmic solution Place 1 drop into the right eye 2 times daily      gabapentin (NEURONTIN) 300 MG capsule Take 300 mg by mouth 2 times daily       levothyroxine (SYNTHROID, LEVOTHROID) 100 MCG tablet Take 100 mcg by mouth daily before breakfast       !! loperamide (IMODIUM) 2 MG capsule Take 2 mg by mouth every morning      !! loperamide (IMODIUM) 2 MG capsule Take 2 mg by mouth daily as needed for diarrhea (second dose if needed)      MELALEUCA SC Take by mouth daily Melaleuca vitamin supplements containing - Multivitamin, minerals, calcium      netarsudil (RHOPRESSA) 0.02 % ophthalmic solution Place 1 drop into the right eye At Bedtime      potassium chloride SA (K-DUR,KLOR-CON) 10 MEQ tablet Take 10 mEq by mouth two times daily With food      tafluprost, PF, (ZIOPTAN, PF,) 0.0015 % Dpet ophthalmic dropperette Place 1 drop into the right eye At Bedtime       !! traMADol  (ULTRAM) 50 mg tablet Take 50 mg by mouth At Bedtime       vitamin E 400 unit capsule [VITAMIN E 400 UNIT CAPSULE] Take 400 Units by mouth daily.       !! - Potential duplicate medications found. Please discuss with provider.                Rationale for medication changes:      none        Consults       HEMATOLOGY & ONCOLOGY IP CONSULT  PAIN MANAGEMENT ADULT IP CONSULT  OCCUPATIONAL THERAPY ADULT IP CONSULT  PHYSICAL THERAPY ADULT IP CONSULT  HEMATOLOGY & ONCOLOGY IP CONSULT  SOCIAL WORK IP CONSULT    Immunizations given this encounter     Most Recent Immunizations   Administered Date(s) Administered     COVID-19,PF,Moderna 03/25/2021     Influenza (High Dose) 3 valent vaccine 09/21/2016           Anticoagulation Information      Recent INR results:   Recent Labs   Lab 09/02/21  1933   INR 1.34*     Warfarin doses (if applicable) or name of other anticoagulant: none      SIGNIFICANT IMAGING FINDINGS     Results for orders placed or performed during the hospital encounter of 09/02/21   MR Lumbar Spine w/o Contrast    Impression    IMPRESSION:  1.  Slight progression of sacral marrow replacing process with encroachment upon the mid sacral plexus. There is also interval development of an adjacent pelvic soft tissue mass which is incompletely visualized seen on sagittal images 4-12 of series 5   and 6.    2.  Thoracic and lumbar compression fractures with progression since the prior study. Varying ages. No evidence for thoracic or lumbar epidural soft tissue mass or paraspinous tissue.    3.  Moderate to severe, multilevel lumbar spondylosis.    4.  Focal marrow replacing lesion within the left femoral head.   XR Chest Port 1 View    Impression    IMPRESSION: Heart size is normal. Vague focus of airspace opacification in the right infrahilar region could reflect atelectasis or a developing infiltrate. Lungs otherwise clear. No visible pneumothorax.       SIGNIFICANT LABORATORY FINDINGS     Most Recent 3 CBC's:Recent  Labs   Lab Test 09/02/21 1933   WBC 4.3   HGB 10.9*   *   *     Most Recent 3 BMP's:Recent Labs   Lab Test 09/03/21  1504 09/03/21  0641 09/03/21  0050 09/02/21 1933 09/11/20  0922 09/03/19  0852   NA  --   --   --  135* 140 141   POTASSIUM 4.3 3.4*  --  3.2* 4.1 4.3   CHLORIDE  --   --   --  106 107 107   CO2  --   --   --  23 21* 26   BUN  --   --   --  13 18 20   CR  --   --  0.66 0.74 0.79 0.83   ANIONGAP  --   --   --  6 12 8   ACACIA  --   --   --  10.4 9.2 9.4   GLC  --   --   --  141* 89 93     Most Recent 2 LFT's:Recent Labs   Lab Test 09/02/21 1933 09/11/20 0922   AST 35 23   ALT 27 17   ALKPHOS 98 57   BILITOTAL 0.4 0.8     Most Recent 3 INR's:Recent Labs   Lab Test 09/02/21 1933   INR 1.34*           Discharge Orders        Reason for your hospital stay    Acute on chronic pain     Follow-up and recommended labs and tests     Oncology arranged follow-up with them next week for further management     Activity    Your activity upon discharge: activity as tolerated     Diet    Follow this diet upon discharge: Regular       Examination   Physical Exam   Temp:  [97.7  F (36.5  C)-100  F (37.8  C)] 97.8  F (36.6  C)  Pulse:  [] 64  Resp:  [16-20] 18  BP: ()/(51-76) 105/56  SpO2:  [92 %-98 %] 98 %  Wt Readings from Last 1 Encounters:   09/02/21 54.4 kg (120 lb)             Please see EMR for more detailed significant labs, imaging, consultant notes etc.    I, Megan Hernandez MD, personally saw the patient today and spent less than or equal to 30 minutes discharging this patient.    Megan Hernandez MD  [facility]    CC:Roland Henriquez

## 2021-09-03 NOTE — CONSULTS
Care Management Initial Consult    General Information  Assessment completed with: Patient, Children, nathanael Cisneros    Type of CM/SW Visit: Initial Assessment    Primary Care Provider verified and updated as needed: Yes     Readmission within the last 30 days: no previous admission in last 30 days        Reason for Consult: discharge planning    Advance Care Planning: Advance Care Planning Reviewed: other (comment) (has HCD not in chart - son provided copy -put in hard chart)       Communication Assessment  Patient's communication style: spoken language (English or Bilingual)    Hearing Difficulty or Deaf: no   Wear Glasses or Blind: yes (pt legally blind)    Cognitive  Cognitive/Neuro/Behavioral: WDL                      Living Environment:   People in home: sibling(s), other (see comments) (pt)     Current living Arrangements: house      Able to return to prior arrangements: yes     Family/Social Support:  Care provided by: self  Provides care for: no one  Marital Status:   Sibling(s), Children          Description of Support System: Supportive    Support Assessment: Adequate family and caregiver support    Current Resources:   Patient receiving home care services: No     Community Resources: None  Equipment currently used at home: cane, straight, walker, standard  Supplies currently used at home: None    Employment/Financial:  Employment Status:          Financial Concerns:             Lifestyle & Psychosocial Needs:  Social Determinants of Health     Tobacco Use: Low Risk      Smoking Tobacco Use: Never Smoker     Smokeless Tobacco Use: Never Used   Alcohol Use:      Frequency of Alcohol Consumption:      Average Number of Drinks:      Frequency of Binge Drinking:    Financial Resource Strain:      Difficulty of Paying Living Expenses:    Food Insecurity:      Worried About Running Out of Food in the Last Year:      Ran Out of Food in the Last Year:    Transportation Needs:      Lack of Transportation  (Medical):      Lack of Transportation (Non-Medical):    Physical Activity:      Days of Exercise per Week:      Minutes of Exercise per Session:    Stress:      Feeling of Stress :    Social Connections:      Frequency of Communication with Friends and Family:      Frequency of Social Gatherings with Friends and Family:      Attends Catholic Services:      Active Member of Clubs or Organizations:      Attends Club or Organization Meetings:      Marital Status:    Intimate Partner Violence:      Fear of Current or Ex-Partner:      Emotionally Abused:      Physically Abused:      Sexually Abused:    Depression:      PHQ-2 Score:    Housing Stability:      Unable to Pay for Housing in the Last Year:      Number of Places Lived in the Last Year:      Unstable Housing in the Last Year:        Functional Status:  Prior to admission patient needed assistance:   Dependent ADLs:: Independent  Dependent IADLs:: Independent  Assesssment of Functional Status: At functional baseline    Mental Health Status:          Chemical Dependency Status:                Values/Beliefs:  Spiritual, Cultural Beliefs, Catholic Practices, Values that affect care:                 Additional Information:    SW met with pt and pt's son Nir to address consult and discuss discharge planning.  Pt's son is visiting from [place].  Pt lives with her twin sister Kate in private home.  They each have their own space.  Pt's niece Adrianna lives next door to them.  Pt reported that she sees an oncologist, radiologist, and pain specialist at [facility].  Dr Ramos is her chemo doctor.  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  Pt reported that she had been experiencing increased left hip pain as of late.  She had also had trouble bearing weight and ambulating due to this pain.  She had an outpatient appointment scheduled for 9/15/21 for a MRI.  She is grateful  "that she came in after having a \"rough day\" at chemotherapy. 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  Pt and son noted that pt might need a commode at home.  Pt independent at baseline.  Pt denied discharge needs.  Son to transport.   Pt seemed a little anxious and verbalized that she hoped to go home today.  Son provided copy of pt's HCD to SW and SW put copy in pt's hard chart.  Pt may discharge today if cleared by oncology and passes therapy.      LEROY Carias, PIERCE 09/03/21 4:31 PM            "

## 2021-09-03 NOTE — PROGRESS NOTES
"PRIMARY DIAGNOSIS: \"GENERIC\" NURSING  OUTPATIENT/OBSERVATION GOALS TO BE MET BEFORE DISCHARGE:  1. ADLs back to baseline: No    2. Activity and level of assistance: Up with maximum assistance. Consider SW and/or PT evaluation.     3. Pain status: Improved-controlled with oral pain medications.    4. Return to near baseline physical activity: No     Discharge Planner Nurse   Safe discharge environment identified: No     Barriers to discharge: Yes       Entered by: JEANETTE WHITE 09/03/2021 4:24 AM     Please review provider order for any additional goals.   Nurse to notify provider when observation goals have been met and patient is ready for discharge.  "

## 2021-09-03 NOTE — H&P
"Admission History and Physical   Amelia Fry,  1940, MRN [id number]    [facility]  Intractable pain [R52]    PCP: Roland Henriquez, [phone]   Code status:  Full Code       Extended Emergency Contact Information  Primary Emergency Contact: Kate Osuna   [facility]  Home Phone: [phone]  Relation: Sister  Secondary Emergency Contact: Nir Fry   [facility]  Home Phone: [phone]  Mobile Phone: [phone]  Relation: Son       Assessment and Plan   Amelia Fry is a 81 year old female presents with     Back pain and leg pain  Left leg weakness  -MRI showing \"Slight progression of sacral marrow replacing process with encroachment upon the mid sacral plexus.\"  -MRI showing \"Thoracic and lumbar compression fractures with progression\"  -\"Focal marrow replacing lesion within the left femoral head.\"  -pain control  -pain management consultation    MM  -recent radiation and chemotherapy  -oncology consultation    Hypokalemia  -replace as per protocol    Hyponatremia  -ns    DVT Prophylaxis: subcutaneous Lovenox due to malignancy  Diet: Orders Placed This Encounter      Advance Diet as Tolerated: Clear Liquid Diet     Central Lines: None     Quintana Catheter: Not present       Disposition: Admitted inpatient. Anticipated Length of Stay in midnights (including a midnight in the Emergency Department after triage if applicable) is more than 2 nights          Chief Complaint: Leg pain     HPI:    Amelia Fry is a 81 year old old female with history of multiple myeloma, glaucoma, and chronic back pain who presents to the ED for evaluation of leg pain and lower back pain.      The patient reports she went up to her cabin last weekend. When she left for the cabin she was walking well and did not have to use her walker. She did not take her walker with her to the cabin. She then rode in the car for 4 hours. When she arrived at the cabin she felt weakness in her left leg. She was " "afraid that she would fall. She was able to walk the rest of the weekend with a cane and support person. She had the cane in her right hand and the support person holding on to her left arm. While walking like this she experienced pain in her left leg and low back. Her left leg pain is around the proximal end of her leg. There is no leg or back pain when the patient is lying down. Denies any other complaints at this time.     The patient recently finished radiation treatment for her multiple myeloma. She had 10 consecutive radiation treatments targeting the coccyx. Her last radiation treatment was 8/25. Today she began her chemotherapy treatment.     When EMS arrived the patient was found to have a heart rate in the 120s. Her oxygen saturation on room air was 85%. She was given 50 mg fentanyl and 2 L nasal canula. Her temperature was measured to be 100.0 F in the ED.    History was obtained from pt/chart review.      Medical History  History reviewed. No pertinent past medical history.  MM   Surgical History  She  has a past surgical history that includes Eye surgery.       Social History  Reviewed, and she  reports that she has never smoked. She has never used smokeless tobacco. She reports current alcohol use. She reports that she does not use drugs.       Allergies  Allergies   Allergen Reactions     Codeine Unknown     Chest pressure, shortness of breath     External Allergen Needs Reconciliation - See Comment Unknown     UNABLE TO ASSESS, States \"all opiates.\"    Family History  Reviewed, and family history is not on file.  Not pertinent to chief complaints or current medical problems.          Prior to Admission Medications   Medications Prior to Admission   Medication Sig Dispense Refill Last Dose     acetaminophen (TYLENOL) 325 MG tablet Take 650 mg by mouth 3 times daily   9/2/2021 at Unknown time     aspirin 81 mg chewable tablet Take 81 mg by mouth At Bedtime    9/1/2021 at Unknown time     brimonidine " (ALPHAGAN) 0.2 % ophthalmic solution Place 1 drop into the right eye 2 times daily    9/2/2021 at am     cholecalciferol 25 MCG (1000 UT) TABS Take 1,000 Units by mouth daily   9/2/2021 at Unknown time     cholestyramine-aspartame, sugar free, (CHOLESTYRAMINE LIGHT) 4 gram PwPk [CHOLESTYRAMINE-ASPARTAME, SUGAR FREE, (CHOLESTYRAMINE LIGHT) 4 GRAM PWPK] Take 1 packet by mouth daily after supper.   9/1/2021 at Unknown time     diphenoxylate-atropine (LOMOTIL) 2.5-0.025 MG tablet Take 1 tablet by mouth 4 times daily as needed for diarrhea For persistent diarrhea not controlled with Lomotil   Unknown at Unknown time     dorzolamide-timolol (COSOPT) 2-0.5 % ophthalmic solution Place 1 drop into the right eye 2 times daily   9/2/2021 at am     gabapentin (NEURONTIN) 300 MG capsule Take 300 mg by mouth 2 times daily    9/2/2021 at am     levothyroxine (SYNTHROID, LEVOTHROID) 100 MCG tablet Take 100 mcg by mouth daily before breakfast    9/2/2021 at Unknown time     loperamide (IMODIUM) 2 MG capsule Take 2 mg by mouth every morning   9/1/2021 at am     loperamide (IMODIUM) 2 MG capsule Take 2 mg by mouth daily as needed for diarrhea (second dose if needed)   Unknown at Unknown time     MELALEUCA SC Take by mouth daily Melaleuca vitamin supplements containing - Multivitamin, minerals, calcium   9/2/2021 at Unknown time     netarsudil (RHOPRESSA) 0.02 % ophthalmic solution Place 1 drop into the right eye At Bedtime   9/1/2021 at Unknown time     potassium chloride SA (K-DUR,KLOR-CON) 10 MEQ tablet Take 10 mEq by mouth two times daily With food   9/2/2021 at am     tafluprost, PF, (ZIOPTAN, PF,) 0.0015 % Dpet ophthalmic dropperette Place 1 drop into the right eye At Bedtime    9/1/2021 at Unknown time     traMADol (ULTRAM) 50 mg tablet Take 50 mg by mouth At Bedtime    9/1/2021 at Unknown time     vitamin E 400 unit capsule [VITAMIN E 400 UNIT CAPSULE] Take 400 Units by mouth daily.   9/2/2021 at Unknown time          Review of  "Systems:  A 12 point comprehensive review of systems was negative except as noted.    ROS  Physical Exam:  Temp:  [100  F (37.8  C)] 100  F (37.8  C)  Pulse:  [103-107] 107  Resp:  [16-20] 16  BP: (117-124)/(66-76) 117/76  SpO2:  [92 %-95 %] 94 %    /76   Pulse 107   Temp 100  F (37.8  C) (Oral)   Resp 16   Ht 1.626 m (5' 4\")   Wt 54.4 kg (120 lb)   SpO2 94%   BMI 20.60 kg/m      Physical Exam  Vitals and nursing note reviewed.   Constitutional:       General: She is not in acute distress.     Appearance: She is normal weight. She is not ill-appearing.   HENT:      Head: Normocephalic and atraumatic.      Right Ear: External ear normal.      Left Ear: External ear normal.      Nose: Nose normal.      Mouth/Throat:      Mouth: Mucous membranes are moist.   Eyes:      General:         Right eye: No discharge.         Left eye: No discharge.      Pupils: Pupils are equal, round, and reactive to light.      Comments: Left eye blind, right eye vision impaired   Cardiovascular:      Rate and Rhythm: Normal rate and regular rhythm.      Pulses: Normal pulses.      Heart sounds: No murmur heard.     Pulmonary:      Effort: Pulmonary effort is normal. No respiratory distress.   Abdominal:      General: There is no distension.      Palpations: There is no mass.   Musculoskeletal:      Cervical back: Normal range of motion and neck supple. No rigidity.      Right lower leg: No edema.      Left lower leg: No edema.   Skin:     General: Skin is warm and dry.      Coloration: Skin is not jaundiced.   Neurological:      Mental Status: She is alert. Mental status is at baseline.      Motor: Weakness present.   Psychiatric:         Mood and Affect: Mood normal.               Pertinent Labs  Lab Results: personally reviewed.   Results for CLARE LANDON (MRN [id number]) as of 9/2/2021 21:55   Ref. Range 9/2/2021 19:33 9/2/2021 19:56   Sodium Latest Ref Range: 136 - 145 mmol/L 135 (L)    Potassium Latest Ref Range: 3.5 " - 5.0 mmol/L 3.2 (L)    Chloride Latest Ref Range: 98 - 107 mmol/L 106    Carbon Dioxide Latest Ref Range: 22 - 31 mmol/L 23    Urea Nitrogen Latest Ref Range: 8 - 28 mg/dL 13    Creatinine Latest Ref Range: 0.60 - 1.10 mg/dL 0.74    GFR Estimate Latest Ref Range: >60 mL/min/1.73m2 77    Calcium Latest Ref Range: 8.5 - 10.5 mg/dL 10.4    Anion Gap Latest Ref Range: 5 - 18 mmol/L 6    Albumin Latest Ref Range: 3.5 - 5.0 g/dL 2.9 (L)    Protein Total Latest Ref Range: 6.0 - 8.0 g/dL 8.8 (H)    Bilirubin Total Latest Ref Range: 0.0 - 1.0 mg/dL 0.4    Alkaline Phosphatase Latest Ref Range: 45 - 120 U/L 98    ALT Latest Ref Range: 0 - 45 U/L 27    AST Latest Ref Range: 0 - 40 U/L 35    BNP Latest Ref Range: 0 - 155 pg/mL 121    Lactic Acid Latest Ref Range: 0.7 - 2.0 mmol/L 1.3    Troponin I Latest Ref Range: 0.00 - 0.29 ng/mL 0.03    Glucose Latest Ref Range: 70 - 125 mg/dL 141 (H)    WBC Latest Ref Range: 4.0 - 11.0 10e3/uL 4.3    Hemoglobin Latest Ref Range: 11.7 - 15.7 g/dL 10.9 (L)    Hematocrit Latest Ref Range: 35.0 - 47.0 % 32.8 (L)    Platelet Count Latest Ref Range: 150 - 450 10e3/uL 104 (L)    RBC Count Latest Ref Range: 3.80 - 5.20 10e6/uL 3.25 (L)    MCV Latest Ref Range: 78 - 100 fL 101 (H)    MCH Latest Ref Range: 26.5 - 33.0 pg 33.5 (H)    MCHC Latest Ref Range: 31.5 - 36.5 g/dL 33.2    RDW Latest Ref Range: 10.0 - 15.0 % 14.4    % Neutrophils Latest Units: % 88    % Lymphocytes Latest Units: % 6    % Monocytes Latest Units: % 5    % Eosinophils Latest Units: % 0    Absolute Basophils Latest Ref Range: 0.0 - 0.2 10e3/uL 0.0    % Basophils Latest Units: % 0    Absolute Eosinophils Latest Ref Range: 0.0 - 0.7 10e3/uL 0.0    Absolute Immature Granulocytes Latest Ref Range: <=0.0 10e3/uL 0.0    Absolute Lymphocytes Latest Ref Range: 0.8 - 5.3 10e3/uL 0.3 (L)    Absolute Monocytes Latest Ref Range: 0.0 - 1.3 10e3/uL 0.2    % Immature Granulocytes Latest Units: % 1    Absolute Neutrophils Latest Ref Range: 1.6  - 8.3 10e3/uL 3.8    Absolute NRBCs Latest Units: 10e3/uL 0.0    NRBCs per 100 WBC Latest Ref Range: <1 /100 0    INR Latest Ref Range: 0.85 - 1.15  1.34 (H)    PTT Latest Ref Range: 22 - 38 Seconds 30    Color Urine Latest Ref Range: Colorless, Straw, Light Yellow, Yellow   Light Yellow   Appearance Urine Latest Ref Range: Clear   Clear   Glucose Urine Latest Ref Range: Negative mg/dL  Negative   Bilirubin Urine Latest Ref Range: Negative   Negative   Ketones Urine Latest Ref Range: Negative mg/dL  10 (A)   Specific Gravity Urine Latest Ref Range: 1.001 - 1.030   1.013   pH Urine Latest Ref Range: 5.0 - 7.0   7.0   Protein Albumin Urine Latest Ref Range: Negative mg/dL  10 (A)   Urobilinogen mg/dL Latest Ref Range: <2.0 mg/dL  <2.0   Nitrite Urine Latest Ref Range: Negative   Negative   Blood Urine Latest Ref Range: Negative   0.2 mg/dL (A)   Leukocyte Esterase Urine Latest Ref Range: Negative   Negative   WBC Urine Latest Ref Range: <=5 /HPF  4   RBC Urine Latest Ref Range: <=2 /HPF  16 (H)   Squamous Epithelial /HPF Urine Latest Ref Range: <=1 /HPF  <1   Mucus Urine Latest Ref Range: None Seen /LPF  Present (A)     Pertinent Radiology  Radiology Results: Personally reviewed impression/s    EXAM: MR LUMBAR SPINE W/O CONTRAST  LOCATION: [facility]  DATE/TIME: 9/2/2021 8:37 PM     INDICATION: Lumbosacral pain and leg weakness.  COMPARISON: [facility] MRI lumbar spine 8/23/2013.                                                                   IMPRESSION:  1.  Slight progression of sacral marrow replacing process with encroachment upon the mid sacral plexus. There is also interval development of an adjacent pelvic soft tissue mass which is incompletely visualized seen on sagittal images 4-12 of series 5   and 6.     2.  Thoracic and lumbar compression fractures with progression since the prior study. Varying ages. No evidence for thoracic or lumbar epidural soft tissue mass or  paraspinous tissue.     3.  Moderate to severe, multilevel lumbar spondylosis.     4.  Focal marrow replacing lesion within the left femoral head.  EKG Results: personally reviewed.   I have independently reviewed and interpreted the EKG.    Advanced Care Planning  Discharge planning discussed with patient        [facility]    Ashish Block MD  Office: [phone]

## 2021-09-03 NOTE — PLAN OF CARE
VSS, A&Ox4, Legally blind. Left eye blind; Right eye sees shadows.  Pt gave return demonstration of finding and activating call light. Bed alarm on. Denies pain when laying still. Able to  slowly turn side to side with assist. Skin intact as seen. Takes pills one at a time with water (no ice), sitting 90 degrees. Continue to monitor

## 2021-09-03 NOTE — CONSULTS
Consultation    Amelia Fry MRN# [id number]   YOB: 1940 Age: 81 year old   Date of Admission: 9/2/2021  Requesting physician:   Reason for consult:            Assessment and Plan:   1. IgG lambda multiple myeloma, recently started treatment with daratumumab/velcade on 9/2/21 due to progressive disease with rising light chains  2. Left leg weakness  3. Acute on chronic back/leg pain  4. 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 Agree with consult to pain management, although patient now stating pain is well controlled currently and she would like to go home.  She follows with palliative care through our office and will follow-up with them soon.  2. Will have patient see her radiation oncologist, Dr. Helm, in the near future for consideration of outpatient radiation to the left hip.  3. Agree with PT/OT consult but likely ok to return home as patient feels she is back to her baseline.  4. Follow-up in our clinic next week as scheduled 9/9/21    Case discussed with Dr. Hernandez.  Ok to discharge to home from heme/onc standpoint.  Follow-up next week.    JUSTIN Loyola  [facility]  [phone]             Chief Complaint:   Fatigue           History of Present Illness:   This patient is a 81 year old female with a of history IgG multiple myeloma recently progressed on Revlimid maintenance and initiated treatment with subcutaneous daratumumab and Velcade on 9/2/2021.  Patient presents now with increasing and leg pain as well as left leg weakness.  She underwent an MRI of the pelvis which shows slight progression of sacral marrow replacing process with encroachment upon the mid sacral plexus as well as thoracic and lumbar compression fractures with progression and focal marrow replacing lesion within the left femoral head.  Of note, patient received palliative radiation to the sacrum from 8/5/2021 through 8/18/2021.  In the past, patient has also been radiated at T1-T2  "and C7-T4.  Pain management and PT OT have been consulted. She rates her pain at a 1-2 while lying still.  Pain progresses throughout the day when she is up with her walker.  She would like to try taking her tramadol in the afternoon to see if this helps.    ONCOLOGIC HISTORY:  1. , developed back pain. Workup eventually revealed marrow replacing lesions. She was subsequently diagnosed with multiple myeloma. She received Velcade and maintenance Revlimid.  2.  - 2015, 20 Gy in 10 fractions to T1-2. This was followed by 20 Gy in 10 fractions to C7-T4.  3. 2021, MRI lumbar and thoracic spine showed diffuse marrow signal abnormality. Larger lesion centered at S2 with infiltration within sacrum with multiple compression fracture deformities at T9, T10, T12, L1, L4.   4.  2021, patient          Physical Exam:   Vitals were reviewed  Blood pressure 105/56, pulse 64, temperature 97.8  F (36.6  C), temperature source Oral, resp. rate 18, height 1.626 m (5' 4\"), weight 54.4 kg (120 lb), SpO2 98 %.  Temperatures:  Current - Temp: 97.8  F (36.6  C); Max - Temp  Av.3  F (36.8  C)  Min: 97.7  F (36.5  C)  Max: 100  F (37.8  C)  Respiration range: Resp  Av  Min: 16  Max: 20  Pulse range: Pulse  Av.1  Min: 64  Max: 107  Blood pressure range: Systolic (24hrs), Av , Min:90 , Max:124   ; Diastolic (24hrs), Av, Min:51, Max:76    Pulse oximetry range: SpO2  Av.6 %  Min: 92 %  Max: 98 %    Intake/Output Summary (Last 24 hours) at 9/3/2021 1250  Last data filed at 9/3/2021 1030  Gross per 24 hour   Intake 1210 ml   Output 500 ml   Net 710 ml       GENERAL: No acute distress. Thin elderly female lying in bed.  SKIN: No rashes or jaundice.  HEENT: Normocephalic, atraumatic. Eyes anicteric. Oropharynx is clear.  LUNGS: no shortness of breath with conversation.  EXTREMITIES: No clubbing, cyanosis, or edema.  MENTAL: Alert and oriented to person, place, and time.  NEURO: Cranial nerves II " "through XII grossly intact with no focal motor or sensory deficits.              Past Medical History:   I have reviewed this patient's past medical history  History reviewed. No pertinent past medical history.          Past Surgical History:   I have reviewed this patient's past surgical history  Past Surgical History:   Procedure Laterality Date     EYE SURGERY                 Social History:   I have reviewed this patient's social history  Social History     Tobacco Use     Smoking status: Never Smoker     Smokeless tobacco: Never Used   Substance Use Topics     Alcohol use: Yes     Comment: Alcoholic Drinks/day: a few glasses of wine a month             Family History:   I have reviewed this patient's family history  History reviewed. No pertinent family history.          Allergies:     Allergies   Allergen Reactions     Codeine Anaphylaxis     Chest pressure, shortness of breath     External Allergen Needs Reconciliation - See Comment Unknown     UNABLE TO ASSESS, States \"all opiates.\"             Medications:   I have reviewed this patient's current medications  Medications Prior to Admission   Medication Sig Dispense Refill Last Dose     acetaminophen (TYLENOL) 325 MG tablet Take 650 mg by mouth 3 times daily   9/2/2021 at Unknown time     aspirin 81 mg chewable tablet Take 81 mg by mouth At Bedtime    9/1/2021 at Unknown time     brimonidine (ALPHAGAN P) 0.1 % ophthalmic solution Place 1 drop into the right eye 2 times daily    9/2/2021 at am     cholecalciferol 25 MCG (1000 UT) TABS Take 1,000 Units by mouth daily   9/2/2021 at Unknown time     cholestyramine-aspartame, sugar free, (CHOLESTYRAMINE LIGHT) 4 gram PwPk [CHOLESTYRAMINE-ASPARTAME, SUGAR FREE, (CHOLESTYRAMINE LIGHT) 4 GRAM PWPK] Take 1 packet by mouth daily after supper.   9/1/2021 at Unknown time     diphenoxylate-atropine (LOMOTIL) 2.5-0.025 MG tablet Take 1 tablet by mouth 4 times daily as needed for diarrhea For persistent diarrhea not " controlled with Lomotil   Unknown at Unknown time     dorzolamide-timolol (COSOPT) 2-0.5 % ophthalmic solution Place 1 drop into the right eye 2 times daily   9/2/2021 at am     gabapentin (NEURONTIN) 300 MG capsule Take 300 mg by mouth 2 times daily    9/2/2021 at am     levothyroxine (SYNTHROID, LEVOTHROID) 100 MCG tablet Take 100 mcg by mouth daily before breakfast    9/2/2021 at Unknown time     loperamide (IMODIUM) 2 MG capsule Take 2 mg by mouth every morning   9/1/2021 at am     loperamide (IMODIUM) 2 MG capsule Take 2 mg by mouth daily as needed for diarrhea (second dose if needed)   Unknown at Unknown time     MELALEUCA SC Take by mouth daily Melaleuca vitamin supplements containing - Multivitamin, minerals, calcium   9/2/2021 at Unknown time     netarsudil (RHOPRESSA) 0.02 % ophthalmic solution Place 1 drop into the right eye At Bedtime   9/1/2021 at Unknown time     potassium chloride SA (K-DUR,KLOR-CON) 10 MEQ tablet Take 10 mEq by mouth two times daily With food   9/2/2021 at am     tafluprost, PF, (ZIOPTAN, PF,) 0.0015 % Dpet ophthalmic dropperette Place 1 drop into the right eye At Bedtime    9/1/2021 at Unknown time     traMADol (ULTRAM) 50 mg tablet Take 50 mg by mouth At Bedtime    9/1/2021 at Unknown time     vitamin E 400 unit capsule [VITAMIN E 400 UNIT CAPSULE] Take 400 Units by mouth daily.   9/2/2021 at Unknown time     Current Facility-Administered Medications Ordered in Epic   Medication Dose Route Frequency Last Rate Last Admin     acetaminophen (TYLENOL) tablet 650 mg  650 mg Oral Q6H PRN   650 mg at 09/03/21 0008    Or     acetaminophen (TYLENOL) Suppository 650 mg  650 mg Rectal Q6H PRN         acetaminophen (TYLENOL) tablet 650 mg  650 mg Oral TID   650 mg at 09/03/21 1003     aspirin (ASA) chewable tablet 81 mg  81 mg Oral At Bedtime   81 mg at 09/03/21 0008     brimonidine (ALPHAGAN P) 0.1 % ophthalmic solution 1 drop  1 drop Right Eye BID         cholestyramine (QUESTRAN) Packet 4 g   1 packet Oral BID   4 g at 09/03/21 1003     diphenoxylate-atropine (LOMOTIL) 2.5-0.025 MG per tablet 1 tablet  1 tablet Oral 4x Daily PRN   1 tablet at 09/03/21 1037     dorzolamide-timolol (COSOPT) ophthalmic solution 1 drop  1 drop Right Eye BID         enoxaparin ANTICOAGULANT (LOVENOX) injection 30 mg  30 mg Subcutaneous At Bedtime   30 mg at 09/03/21 0318     gabapentin (NEURONTIN) capsule 300 mg  300 mg Oral BID   300 mg at 09/03/21 1003     levothyroxine (SYNTHROID/LEVOTHROID) tablet 100 mcg  100 mcg Oral QAM AC   100 mcg at 09/03/21 0642     loperamide (IMODIUM) capsule 2 mg  2 mg Oral Daily PRN         melatonin tablet 1 mg  1 mg Oral At Bedtime PRN         naloxone (NARCAN) injection 0.2 mg  0.2 mg Intravenous Q2 Min PRN        Or     naloxone (NARCAN) injection 0.4 mg  0.4 mg Intravenous Q2 Min PRN        Or     naloxone (NARCAN) injection 0.2 mg  0.2 mg Intramuscular Q2 Min PRN        Or     naloxone (NARCAN) injection 0.4 mg  0.4 mg Intramuscular Q2 Min PRN         netarsudil (RHOPRESSA) 0.02 % ophthalmic solution 1 drop  1 drop Right Eye At Bedtime         ondansetron (ZOFRAN-ODT) ODT tab 4 mg  4 mg Oral Q6H PRN        Or     ondansetron (ZOFRAN) injection 4 mg  4 mg Intravenous Q6H PRN         potassium chloride ER (KLOR-CON M) CR tablet 10 mEq  10 mEq Oral two times daily   10 mEq at 09/03/21 1003     senna-docusate (SENOKOT-S/PERICOLACE) 8.6-50 MG per tablet 1 tablet  1 tablet Oral BID PRN        Or     senna-docusate (SENOKOT-S/PERICOLACE) 8.6-50 MG per tablet 2 tablet  2 tablet Oral BID PRN         sodium chloride 0.9% infusion   Intravenous Continuous 75 mL/hr at 09/03/21 1149 New Bag at 09/03/21 1149     traMADol (ULTRAM) tablet 50 mg  50 mg Oral At Bedtime   50 mg at 09/03/21 0009     Vitamin D3 (CHOLECALCIFEROL) tablet 1,000 Units  1,000 Units Oral Daily   1,000 Units at 09/03/21 1011     vitamin E (TOCOPHEROL) 200 units (90 mg) capsule 400 Units  400 Units Oral Daily   400 Units at 09/03/21  1003     No current [facility]-ordered outpatient medications on file.             Review of Systems:     The 10 point Review of Systems is negative other than noted in the HPI.            Data:   Data   Results for orders placed or performed during the hospital encounter of 09/02/21 (from the past 24 hour(s))   CBC with platelets differential    Narrative    The following orders were created for panel order CBC with platelets differential.  Procedure                               Abnormality         Status                     ---------                               -----------         ------                     CBC with platelets and d...[[id number]]  Abnormal            Final result                 Please view results for these tests on the individual orders.   INR   Result Value Ref Range    INR 1.34 (H) 0.85 - 1.15   Partial thromboplastin time   Result Value Ref Range    aPTT 30 22 - 38 Seconds   Comprehensive metabolic panel   Result Value Ref Range    Sodium 135 (L) 136 - 145 mmol/L    Potassium 3.2 (L) 3.5 - 5.0 mmol/L    Chloride 106 98 - 107 mmol/L    Carbon Dioxide (CO2) 23 22 - 31 mmol/L    Anion Gap 6 5 - 18 mmol/L    Urea Nitrogen 13 8 - 28 mg/dL    Creatinine 0.74 0.60 - 1.10 mg/dL    Calcium 10.4 8.5 - 10.5 mg/dL    Glucose 141 (H) 70 - 125 mg/dL    Alkaline Phosphatase 98 45 - 120 U/L    AST 35 0 - 40 U/L    ALT 27 0 - 45 U/L    Protein Total 8.8 (H) 6.0 - 8.0 g/dL    Albumin 2.9 (L) 3.5 - 5.0 g/dL    Bilirubin Total 0.4 0.0 - 1.0 mg/dL    GFR Estimate 77 >60 mL/min/1.73m2   Troponin I   Result Value Ref Range    Troponin I 0.03 0.00 - 0.29 ng/mL   B-Type Natriuretic Peptide ([facility] Only)   Result Value Ref Range     0 - 155 pg/mL   Lactic acid whole blood   Result Value Ref Range    Lactic Acid 1.3 0.7 - 2.0 mmol/L   CBC with platelets and differential   Result Value Ref Range    WBC Count 4.3 4.0 - 11.0 10e3/uL    RBC Count 3.25 (L) 3.80 - 5.20 10e6/uL    Hemoglobin 10.9 (L) 11.7 - 15.7 g/dL     Hematocrit 32.8 (L) 35.0 - 47.0 %     (H) 78 - 100 fL    MCH 33.5 (H) 26.5 - 33.0 pg    MCHC 33.2 31.5 - 36.5 g/dL    RDW 14.4 10.0 - 15.0 %    Platelet Count 104 (L) 150 - 450 10e3/uL    % Neutrophils 88 %    % Lymphocytes 6 %    % Monocytes 5 %    % Eosinophils 0 %    % Basophils 0 %    % Immature Granulocytes 1 %    NRBCs per 100 WBC 0 <1 /100    Absolute Neutrophils 3.8 1.6 - 8.3 10e3/uL    Absolute Lymphocytes 0.3 (L) 0.8 - 5.3 10e3/uL    Absolute Monocytes 0.2 0.0 - 1.3 10e3/uL    Absolute Eosinophils 0.0 0.0 - 0.7 10e3/uL    Absolute Basophils 0.0 0.0 - 0.2 10e3/uL    Absolute Immature Granulocytes 0.0 <=0.0 10e3/uL    Absolute NRBCs 0.0 10e3/uL   Magnesium   Result Value Ref Range    Magnesium 1.6 (L) 1.8 - 2.6 mg/dL   ECG 12-LEAD WITH MUSE ([facility])   Result Value Ref Range    Systolic Blood Pressure  mmHg    Diastolic Blood Pressure  mmHg    Ventricular Rate 106 BPM    Atrial Rate 106 BPM    TN Interval 162 ms    QRS Duration 120 ms     ms    QTc 446 ms    P Axis 59 degrees    R AXIS 99 degrees    T Axis 32 degrees    Interpretation ECG       Sinus tachycardia  Low voltage QRS  Right bundle branch block  Abnormal ECG  When compared with ECG of 20-SEP-2016 17:34,  Vent. rate has increased BY  53 BPM  Questionable change in QRS duration  Confirmed by SEE ED PROVIDER NOTE FOR, ECG INTERPRETATION ([id number]),  ELENA ART ([id number]) on 9/3/2021 7:28:23 AM     UA with Microscopic reflex to Culture    Specimen: Urine, Catheter   Result Value Ref Range    Color Urine Light Yellow Colorless, Straw, Light Yellow, Yellow    Appearance Urine Clear Clear    Glucose Urine Negative Negative mg/dL    Bilirubin Urine Negative Negative    Ketones Urine 10  (A) Negative mg/dL    Specific Gravity Urine 1.013 1.001 - 1.030    Blood Urine 0.2 mg/dL (A) Negative    pH Urine 7.0 5.0 - 7.0    Protein Albumin Urine 10  (A) Negative mg/dL    Urobilinogen Urine <2.0 <2.0 mg/dL    Nitrite Urine Negative Negative     Leukocyte Esterase Urine Negative Negative    Mucus Urine Present (A) None Seen /LPF    RBC Urine 16 (H) <=2 /HPF    WBC Urine 4 <=5 /HPF    Squamous Epithelials Urine <1 <=1 /HPF    Narrative    Urine Culture not indicated   Symptomatic COVID-19 Virus (Coronavirus) by PCR Nasopharyngeal    Specimen: Nasopharyngeal; Swab   Result Value Ref Range    SARS CoV2 PCR Negative Negative    Narrative    Testing was performed using the forrest  SARS-CoV-2 & Influenza A/B Assay on the forrest  [employer]  System.  This test should be ordered for the detection of SARS-COV-2 in individuals who meet SARS-CoV-2 clinical and/or epidemiological criteria. Test performance is unknown in asymptomatic patients.  This test is for in vitro diagnostic use under the FDA EUA for laboratories certified under CLIA to perform moderate and/or high complexity testing. This test has not been FDA cleared or approved.  A negative test does not rule out the presence of PCR inhibitors in the specimen or target RNA in concentration below the limit of detection for the assay. The possibility of a false negative should be considered if the patient's recent exposure or clinical presentation suggests COVID-19.  [facility] are certified under the Clinical Laboratory Improvement Amendments of 1988 (CLIA-88) as qualified to perform moderate and/or high complexity laboratory testing.   MR Lumbar Spine w/o Contrast    Narrative    EXAM: MR LUMBAR SPINE W/O CONTRAST  LOCATION: [facility]  DATE/TIME: 9/2/2021 8:37 PM    INDICATION: Lumbosacral pain and leg weakness.  COMPARISON: [facility] MRI lumbar spine 8/23/2013.  TECHNIQUE: Routine Lumbar Spine MRI without IV contrast.    FINDINGS:    Marrow signal replacing, slightly expansile lesions noted involving the S2, S3 and proximal S4 sacral segments compatible with plasmacytoma/myeloma. There is slight encroachment into the sacral canal. There is also narrowing of the  sacral foramina with   impingement on the traversing S2 and S3 nerves bilaterally. Lesion is contiguous with a pelvic soft tissue mass which is incompletely visualized compatible with localized soft tissue extension of myeloma/plasmacytoma. Lesion was present on the 2013 exam,   however the volume is slightly increased and the soft tissue component is new since that time. No new areas of lumbar marrow signal replacement noted although lumbar compression fractures have progressed as described below.    Marrow replacing lesion within the right femoral head measures 23 mm and is incompletely visualized. Compatible with myeloma. There are a few areas of marrow signal replacement within the iliac wings. Lower thoracic and lumbar marrow signal slightly   heterogeneous but there are no large focal destructive lesions identified. No evidence for lumbar epidural or paraspinous soft tissue mass.    Nomenclature is based on 5 lumbar type vertebral bodies. Chronic appearing compression fracture of the upper and lower endplates at L4 identified with 6.5 mm retropulsion of the lower posterior cortex. 70% height loss centrally. Chronic appearing upper   endplate fracture at L3 without significant bone retropulsion. Subtle acute to subacute upper endplate fracture at L2 with about 10-15% height loss. No bone retropulsion. Chronic L1 upper and lower endplate fractures with 80% height loss centrally and   minimal retropulsion. T12 upper endplate fracture with minimal residual marrow edema noted with about 35% height loss. No retropulsion. Normal distal spinal cord and cauda equina with conus medullaris at L2.    T11-T12: Disc desiccation and mild posterior disc bulge. Facet DJD. Mild right and borderline left foraminal stenosis. Patent central canal.    T12-L1: Retropulsed bone and bulging disc with mild canal stenosis. Adequate foramina. Mild facet DJD.     L1-L2: Bulging disc and minimal bone retropulsion. Adequate central canal.  Patent foramina.    L2-L3: Disc desiccation, mild bulge and facet DJD. Patent central canal and foramen.     L3-L4: Broad-based disc bulge. Facet and ligamentous degenerative changes. Moderate central canal stenosis. Patent foramina.    L4-L5: Retropulsed bone and broad-based disc bulge with moderate canal stenosis. Facet and ligamentous degenerative changes. Moderate right and mild left foraminal stenosis.    L5-S1: Small, central disc protrusion. Moderate facet and ligamentous degenerative changes. Mild narrowing of the medial aspect of the left neural foramen. Right foramen patent. Mild central canal stenosis.      Impression    IMPRESSION:  1.  Slight progression of sacral marrow replacing process with encroachment upon the mid sacral plexus. There is also interval development of an adjacent pelvic soft tissue mass which is incompletely visualized seen on sagittal images 4-12 of series 5   and 6.    2.  Thoracic and lumbar compression fractures with progression since the prior study. Varying ages. No evidence for thoracic or lumbar epidural soft tissue mass or paraspinous tissue.    3.  Moderate to severe, multilevel lumbar spondylosis.    4.  Focal marrow replacing lesion within the left femoral head.   XR Chest Port 1 View    Narrative    EXAM: XR CHEST PORT 1 VIEW  LOCATION: [facility]  DATE/TIME: 9/2/2021 9:50 PM    INDICATION: Fever.  COMPARISON: 05/04/2017      Impression    IMPRESSION: Heart size is normal. Vague focus of airspace opacification in the right infrahilar region could reflect atelectasis or a developing infiltrate. Lungs otherwise clear. No visible pneumothorax.   Creatinine   Result Value Ref Range    Creatinine 0.66 0.60 - 1.10 mg/dL    GFR Estimate 84 >60 mL/min/1.73m2   Potassium   Result Value Ref Range    Potassium 3.4 (L) 3.5 - 5.0 mmol/L

## 2021-09-03 NOTE — PLAN OF CARE
"PRIMARY DIAGNOSIS: \"GENERIC\" NURSING  OUTPATIENT/OBSERVATION GOALS TO BE MET BEFORE DISCHARGE:  ADLs back to baseline: No    Activity and level of assistance: Up with standby assistance.    Pain status: Improved-controlled with oral pain medications.    Return to near baseline physical activity: No     Discharge Planner Nurse   Safe discharge environment identified: Yes  Barriers to discharge: Yes       Entered by: Dayanna Wesley 09/03/2021 11:27 AM     Please review provider order for any additional goals.   Nurse to notify provider when observation goals have been met and patient is ready for discharge.  "

## 2021-09-03 NOTE — PHARMACY-ADMISSION MEDICATION HISTORY
Pharmacy Note - Admission Medication History    Pertinent Provider Information: Patient's family will bring in her eye drops tomorrow. She reported that she is ok to skip tonight's eye drops     ______________________________________________________________________    Prior To Admission (PTA) med list completed and updated in EMR.       PTA Med List   Medication Sig Last Dose     acetaminophen (TYLENOL) 325 MG tablet Take 650 mg by mouth 3 times daily 9/2/2021 at Unknown time     aspirin 81 mg chewable tablet Take 81 mg by mouth At Bedtime  9/1/2021 at Unknown time     brimonidine (ALPHAGAN) 0.2 % ophthalmic solution Place 1 drop into the right eye 2 times daily  9/2/2021 at am     cholecalciferol 25 MCG (1000 UT) TABS Take 1,000 Units by mouth daily 9/2/2021 at Unknown time     cholestyramine-aspartame, sugar free, (CHOLESTYRAMINE LIGHT) 4 gram PwPk [CHOLESTYRAMINE-ASPARTAME, SUGAR FREE, (CHOLESTYRAMINE LIGHT) 4 GRAM PWPK] Take 1 packet by mouth daily after supper. 9/1/2021 at Unknown time     diphenoxylate-atropine (LOMOTIL) 2.5-0.025 MG tablet Take 1 tablet by mouth 4 times daily as needed for diarrhea For persistent diarrhea not controlled with Lomotil Unknown at Unknown time     dorzolamide-timolol (COSOPT) 2-0.5 % ophthalmic solution Place 1 drop into the right eye 2 times daily 9/2/2021 at am     gabapentin (NEURONTIN) 300 MG capsule Take 300 mg by mouth 2 times daily  9/2/2021 at am     levothyroxine (SYNTHROID, LEVOTHROID) 100 MCG tablet Take 100 mcg by mouth daily before breakfast  9/2/2021 at Unknown time     loperamide (IMODIUM) 2 MG capsule Take 2 mg by mouth every morning 9/1/2021 at am     loperamide (IMODIUM) 2 MG capsule Take 2 mg by mouth daily as needed for diarrhea (second dose if needed) Unknown at Unknown time     MELALEUCA SC Take by mouth daily Melaleuca vitamin supplements containing - Multivitamin, minerals, calcium 9/2/2021 at Unknown time     netarsudil (RHOPRESSA) 0.02 % ophthalmic  solution Place 1 drop into the right eye At Bedtime 9/1/2021 at Unknown time     potassium chloride SA (K-DUR,KLOR-CON) 10 MEQ tablet Take 10 mEq by mouth two times daily With food 9/2/2021 at am     tafluprost, PF, (ZIOPTAN, PF,) 0.0015 % Dpet ophthalmic dropperette Place 1 drop into the right eye At Bedtime  9/1/2021 at Unknown time     traMADol (ULTRAM) 50 mg tablet Take 50 mg by mouth At Bedtime  9/1/2021 at Unknown time     vitamin E 400 unit capsule [VITAMIN E 400 UNIT CAPSULE] Take 400 Units by mouth daily. 9/2/2021 at Unknown time       Information source(s): Patient and CareEverywhere/SureScripts  Method of interview communication: in-person with N95 and face shield.    Summary of Changes to PTA Med List  New: Tylenol, melaleuca , Rhopressa, Imodium, Lomotil  Discontinued: acetazolamide, Flonase, Revlimid, Fish oil  Changed: brimonidine, gabapentin, cosopt    Patient was asked about OTC/herbal products specifically.  PTA med list reflects this.    In the past week, patient estimated taking medication this percent of the time:  greater than 90%.    Allergies were reviewed, assessed, and updated with the patient.      Medications currently not available for use during hospital stay. Family/Patient representative states they will bring all eye drops to [facility].    The information provided in this note is only as accurate as the sources available at the time of the update(s).    Thank you for the opportunity to participate in the care of this patient.    Porsche Lo RPH  9/2/2021 10:23 PM

## 2021-09-03 NOTE — PLAN OF CARE
"PRIMARY DIAGNOSIS: \"GENERIC\" NURSING  OUTPATIENT/OBSERVATION GOALS TO BE MET BEFORE DISCHARGE:  ADLs back to baseline: Yes    Activity and level of assistance: Up with standby assistance.    Pain status: Improved-controlled with oral pain medications.    Return to near baseline physical activity: Yes     Discharge Planner Nurse   Safe discharge environment identified: Yes  Barriers to discharge: No       Entered by: Dayanna Wesley 09/03/2021 4:36 PM     Please review provider order for any additional goals.   Nurse to notify provider when observation goals have been met and patient is ready for discharge.  "

## 2021-09-03 NOTE — ED PROVIDER NOTES
EMERGENCY DEPARTMENT ENCOUNTER     NAME: Amelia Fry   AGE: 81 year old female   YOB: 1940   MRN: [id number]   EVALUATION DATE & TIME: 9/2/2021  6:54 PM   PCP: Roland Henriquez     Chief Complaint   Patient presents with     Fatigue   :    FINAL IMPRESSION       1. Multiple myeloma not having achieved remission (H)    2. Left leg weakness    3. Intractable pain           ED COURSE & MEDICAL DECISION MAKING      Pertinent Labs & Imaging studies reviewed. (See chart for details)   81 year old female  presents to the Emergency Department for evaluation of leg pain, weakness, and inability to walk.  Patient has a history of multiple myeloma, has had radiation to her lower tailbone region and just started chemotherapy today.  She notes that just over the last few days all of a sudden she is having pain in her left leg and feels like she cannot bear weight on it. Initial Vitals Reviewed. Initial exam notable for thin patient who initially had a temperature of 100.0 and was mildly tachycardic.  Because she just started chemotherapy and is higher risk, I decided to do an infectious work-up just to ensure that this is not a presentation of sepsis.  Fortunately her lactate is normal, urinalysis is negative, Covid is negative, chest x-ray does not show pneumonia, and otherwise her laboratory work-up is unremarkable.  Given her history I did decide to do an MRI of the spine to see if there was a new fracture, lesion, or radiculopathy causing this left leg issue.  Unfortunately it does look like she has a marrow replacing lesion right in the left femoral head which may actually be the issue.  Ultimately, she cannot get up and walk and this is an acute change for her, and I think the right thing to do is admit her and get the team involved to help with management.  Case discussed with hospitalist Dr. Block who accepted the patient for admission.           At the conclusion of the encounter I discussed the  results of all of the tests and the disposition. The questions were answered. The patient or family acknowledged understanding and was agreeable with the care plan.    7:13 PM I introduced myself to the patient, performed my physical exam, and discussed plan of care.  9:44 PM I updated the patient.  9:54 PM I spoke with the hospitalist, Dr. Block. We discussed the patient's case, and they agree to admit the patient.        MEDICATIONS GIVEN IN THE EMERGENCY:   Medications   sodium chloride 0.9% infusion ( Intravenous New Bag 9/2/21 1958)   fentaNYL (PF) (SUBLIMAZE) injection 50 mcg (has no administration in time range)      NEW PRESCRIPTIONS STARTED AT TODAY'S ER VISIT   New Prescriptions    No medications on file     ================================================================   HISTORY OF PRESENT ILLNESS       Patient information was obtained from: Patient   Use of Intrepreter: N/A   Amelia Fry is a 81 year old female with history of multiple myeloma, glaucoma, and chronic back pain who presents to the ED for evaluation of leg pain.     The patient reports she went up to her cabin last weekend. When she left for the cabin she was walking well and did not have to use her walker. She did not take her walker with her to the cabin. She then rode in the car for 4 hours. When she arrived at the cabin she felt weakness in her left leg. She was afraid that she would fall. She was able to walk the rest of the weekend with a cane and support person. She had the cane in her right hand and the support person holding on to her left arm. While walking like this she experienced pain in her left leg and low back. Her left leg pain is around the proximal end of her leg. There is no leg or back pain when the patient is lying down. Denies any other complaints at this time.    The patient recently finished radiation treatment for her multiple myeloma. She had 10 consecutive radiation treatments targeting the coccyx. Her last  radiation treatment was 8/25. Today she began her chemotherapy treatment.    When EMS arrived the patient was found to have a heart rate in the 120s. Her oxygen saturation on room air was 85%. She was given 50 mg fentanyl and 2 L nasal canula. Her temperature was measured to be 100.0 F in the ED.  ================================================================    REVIEW OF SYSTEMS       Review of Systems   Constitutional: Negative for fever.   HENT: Negative for congestion.    Eyes: Negative for redness.   Respiratory: Negative for shortness of breath.    Cardiovascular: Negative for chest pain.   Gastrointestinal: Negative for abdominal pain.   Genitourinary: Negative for difficulty urinating.   Musculoskeletal: Positive for back pain (low). Negative for arthralgias and neck stiffness.        Endorses upper left leg pain.   Skin: Negative for color change.   Neurological: Negative for headaches.   Psychiatric/Behavioral: Negative for confusion.   All other systems reviewed and are negative.      PAST HISTORY     PAST MEDICAL HISTORY:   History reviewed. No pertinent past medical history.   Multiple myeloma  PAST SURGICAL HISTORY:   Past Surgical History:   Procedure Laterality Date     EYE SURGERY        CURRENT MEDICATIONS:   acetaZOLAMIDE (DIAMOX) 500 mg capsule  aspirin 81 mg chewable tablet  brimonidine (ALPHAGAN) 0.2 % ophthalmic solution  cholestyramine-aspartame, sugar free, (CHOLESTYRAMINE LIGHT) 4 gram PwPk  fluticasone (FLONASE) 50 mcg/actuation nasal spray  gabapentin (NEURONTIN) 300 MG capsule  lenalidomide (REVLIMID) 5 mg cap  levothyroxine (SYNTHROID, LEVOTHROID) 100 MCG tablet  MEDICATION CANNOT BE REORDERED - PLEASE MANUALLY REORDER AND DISCONTINUE THE OLD ORDER  MEDICATION CANNOT BE REORDERED - PLEASE MANUALLY REORDER AND DISCONTINUE THE OLD ORDER  potassium chloride SA (K-DUR,KLOR-CON) 10 MEQ tablet  tafluprost, PF, (ZIOPTAN, PF,) 0.0015 % Dpet ophthalmic dropperette  traMADol (ULTRAM) 50 mg  "tablet  vitamin E 400 unit capsule      ALLERGIES:   Allergies   Allergen Reactions     Codeine Unknown     Chest pressure, shortness of breath     External Allergen Needs Reconciliation - See Comment Unknown     UNABLE TO ASSESS, States \"all opiates.\"      FAMILY HISTORY:   History reviewed. No pertinent family history.   SOCIAL HISTORY:   Social History     Socioeconomic History     Marital status:      Spouse name: Not on file     Number of children: Not on file     Years of education: Not on file     Highest education level: Not on file   Occupational History     Not on file   Tobacco Use     Smoking status: Never Smoker     Smokeless tobacco: Never Used   Substance and Sexual Activity     Alcohol use: Yes     Comment: Alcoholic Drinks/day: a few glasses of wine a month     Drug use: No     Sexual activity: Not on file   Other Topics Concern     Not on file   Social History Narrative     Not on file     Social Determinants of Health     Financial Resource Strain:      Difficulty of Paying Living Expenses:    Food Insecurity:      Worried About Running Out of Food in the Last Year:      Ran Out of Food in the Last Year:    Transportation Needs:      Lack of Transportation (Medical):      Lack of Transportation (Non-Medical):    Physical Activity:      Days of Exercise per Week:      Minutes of Exercise per Session:    Stress:      Feeling of Stress :    Social Connections:      Frequency of Communication with Friends and Family:      Frequency of Social Gatherings with Friends and Family:      Attends Sabianism Services:      Active Member of Clubs or Organizations:      Attends Club or Organization Meetings:      Marital Status:    Intimate Partner Violence:      Fear of Current or Ex-Partner:      Emotionally Abused:      Physically Abused:      Sexually Abused:         VITALS  Patient Vitals for the past 24 hrs:   BP Temp Temp src Pulse Resp SpO2 Height Weight   09/02/21 2023 -- -- -- 107 16 94 % -- -- " "  09/02/21 2000 117/76 -- -- 103 -- 92 % -- --   09/02/21 1904 124/66 100  F (37.8  C) Oral 107 20 95 % 1.626 m (5' 4\") 54.4 kg (120 lb)        ================================================================    PHYSICAL EXAM     VITAL SIGNS: /76   Pulse 107   Temp 100  F (37.8  C) (Oral)   Resp 16   Ht 1.626 m (5' 4\")   Wt 54.4 kg (120 lb)   SpO2 94%   BMI 20.60 kg/m     Constitutional: Frail, elderly female, awake, no acute distress.   HENT:  Atraumatic, oropharynx without exudate or erythema, membranes moist  Lymph:  No adenopathy  Eyes: EOM intact, PERRL, no injection  Neck: Supple  Respiratory:  Clear to auscultation bilaterally, no wheezes or crackles   Cardiovascular:  Regular rate and rhythm, single S1 and S2   GI:  Soft, nontender, nondistended, no rebound or guarding   Musculoskeletal:  Moves all extremities, no lower extremity edema, no deformities    Skin:  Warm to touch, dry.  Neurologic:  Alert and oriented x3, no focal deficits noted. Strength and sensation intact in bilateral lower extremities.      ================================================================  LAB       All pertinent labs reviewed and interpreted.   Labs Ordered and Resulted from Time of ED Arrival Up to the Time of Departure from the ED   INR - Abnormal; Notable for the following components:       Result Value    INR 1.34 (*)     All other components within normal limits   COMPREHENSIVE METABOLIC PANEL - Abnormal; Notable for the following components:    Sodium 135 (*)     Potassium 3.2 (*)     Glucose 141 (*)     Protein Total 8.8 (*)     Albumin 2.9 (*)     All other components within normal limits   ROUTINE UA WITH MICROSCOPIC REFLEX TO CULTURE - Abnormal; Notable for the following components:    Ketones Urine 10  (*)     Blood Urine 0.2 mg/dL (*)     Protein Albumin Urine 10  (*)     Mucus Urine Present (*)     RBC Urine 16 (*)     All other components within normal limits    Narrative:     Urine Culture not " indicated   CBC WITH PLATELETS AND DIFFERENTIAL - Abnormal; Notable for the following components:    RBC Count 3.25 (*)     Hemoglobin 10.9 (*)     Hematocrit 32.8 (*)      (*)     MCH 33.5 (*)     Platelet Count 104 (*)     Absolute Lymphocytes 0.3 (*)     All other components within normal limits   PARTIAL THROMBOPLASTIN TIME - Normal   TROPONIN I - Normal   B-TYPE NATRIURETIC PEPTIDE ([facility] ONLY) - Normal   LACTIC ACID WHOLE BLOOD - Normal   CBC WITH PLATELETS & DIFFERENTIAL    Narrative:     The following orders were created for panel order CBC with platelets differential.  Procedure                               Abnormality         Status                     ---------                               -----------         ------                     CBC with platelets and d...[[id number]]  Abnormal            Final result                 Please view results for these tests on the individual orders.   COVID-19 VIRUS (CORONAVIRUS) BY PCR   COVID-19 VIRUS (CORONAVIRUS) BY PCR   PERIPHERAL IV CATHETER   CARDIAC CONTINUOUS MONITORING   PULSE OXIMETRY NURSING   NURSING DRAW AND HOLD   CALL        ===============================================================  RADIOLOGY       Reviewed all pertinent imaging. Please see official radiology report.   MR Lumbar Spine w/o Contrast   Preliminary Result   IMPRESSION:   1.  Slight progression of sacral marrow replacing process with encroachment upon the mid sacral plexus. There is also interval development of an adjacent pelvic soft tissue mass which is incompletely visualized seen on sagittal images 4-12 of series 5    and 6.      2.  Thoracic and lumbar compression fractures with progression since the prior study. Varying ages. No evidence for thoracic or lumbar epidural soft tissue mass or paraspinous tissue.      3.  Moderate to severe, multilevel lumbar spondylosis.      4.  Focal marrow replacing lesion within the left femoral head.      XR Chest 2 Views    (Results  Pending)   XR Chest Port 1 View    (Results Pending)         ================================================================  EKG     EKG reviewed interpreted by me shows sinus tachycardia with a rate of 106, normal axis,  with no acute ST or T wave changes since September 2016    I have independently reviewed and interpreted the EKG(s) documented above.     ================================================================  PROCEDURES         I, Vishal Webb, am serving as a scribe to document services personally performed by Dr. Ellington based on my observation and the provider's statements to me. I, Lamar Ellington MD attest that Vishal Webb is acting in a scribe capacity, has observed my performance of the services and has documented them in accordance with my direction.   Lamar Ellington M.D.   Emergency Medicine   [facility] EMERGENCY DEPARTMENT  [address] [place] [id number]  [phone]  Dept: [phone]      Lamar Ellington MD  09/02/21 [id number]

## 2021-09-03 NOTE — PLAN OF CARE
"PRIMARY DIAGNOSIS: \"GENERIC\" NURSING  OUTPATIENT/OBSERVATION GOALS TO BE MET BEFORE DISCHARGE:  ADLs back to baseline: No    Activity and level of assistance: Up with maximum assistance.  SW and/or PT evaluation ordered    Pain status: Improved with use of alternative comfort measures i.e.: essential oils. Pain is 1/10    Return to near baseline physical activity: No     Discharge Planner Nurse   Safe discharge environment identified: No  Barriers to discharge: Yes       Entered by: Dayanna Wesley 09/03/2021 1:56 PM     Please review provider order for any additional goals.   Nurse to notify provider when observation goals have been met and patient is ready for discharge.  "

## 2021-09-03 NOTE — ED NOTES
Bed: [facility]-02  Expected date: 9/2/21  Expected time: 6:18 PM  Means of arrival: Ambulance  Comments:  [facility]  not feeling well

## 2021-09-16 ENCOUNTER — DOCUMENTATION ONLY (OUTPATIENT)
Dept: OTHER | Facility: CLINIC | Age: 81
End: 2021-09-16

## 2021-10-28 ENCOUNTER — LAB REQUISITION (OUTPATIENT)
Dept: LAB | Facility: CLINIC | Age: 81
End: 2021-10-28
Payer: COMMERCIAL

## 2021-10-28 DIAGNOSIS — E78.2 MIXED HYPERLIPIDEMIA: ICD-10-CM

## 2021-10-28 DIAGNOSIS — E03.9 HYPOTHYROIDISM, UNSPECIFIED: ICD-10-CM

## 2021-10-28 DIAGNOSIS — E55.9 VITAMIN D DEFICIENCY, UNSPECIFIED: ICD-10-CM

## 2021-10-28 DIAGNOSIS — I10 ESSENTIAL (PRIMARY) HYPERTENSION: ICD-10-CM

## 2021-10-28 LAB
ALBUMIN SERPL-MCNC: 3.4 G/DL (ref 3.5–5)
ALP SERPL-CCNC: 89 U/L (ref 45–120)
ALT SERPL W P-5'-P-CCNC: 15 U/L (ref 0–45)
ANION GAP SERPL CALCULATED.3IONS-SCNC: 5 MMOL/L (ref 5–18)
AST SERPL W P-5'-P-CCNC: 20 U/L (ref 0–40)
BILIRUB SERPL-MCNC: 0.8 MG/DL (ref 0–1)
BUN SERPL-MCNC: 11 MG/DL (ref 8–28)
CALCIUM SERPL-MCNC: 9.3 MG/DL (ref 8.5–10.5)
CHLORIDE BLD-SCNC: 102 MMOL/L (ref 98–107)
CHOLEST SERPL-MCNC: 146 MG/DL
CO2 SERPL-SCNC: 29 MMOL/L (ref 22–31)
CREAT SERPL-MCNC: 0.77 MG/DL (ref 0.6–1.1)
GFR SERPL CREATININE-BSD FRML MDRD: 73 ML/MIN/1.73M2
GLUCOSE BLD-MCNC: 103 MG/DL (ref 70–125)
HDLC SERPL-MCNC: 41 MG/DL
LDLC SERPL CALC-MCNC: 88 MG/DL
POTASSIUM BLD-SCNC: 4.1 MMOL/L (ref 3.5–5)
PROT SERPL-MCNC: 7.9 G/DL (ref 6–8)
SODIUM SERPL-SCNC: 136 MMOL/L (ref 136–145)
TRIGL SERPL-MCNC: 85 MG/DL
TSH SERPL DL<=0.005 MIU/L-ACNC: 0.73 UIU/ML (ref 0.3–5)

## 2021-10-28 PROCEDURE — 80061 LIPID PANEL: CPT | Mod: ORL | Performed by: FAMILY MEDICINE

## 2021-10-28 PROCEDURE — 82306 VITAMIN D 25 HYDROXY: CPT | Mod: ORL | Performed by: FAMILY MEDICINE

## 2021-10-28 PROCEDURE — 80053 COMPREHEN METABOLIC PANEL: CPT | Mod: ORL | Performed by: FAMILY MEDICINE

## 2021-10-28 PROCEDURE — 84443 ASSAY THYROID STIM HORMONE: CPT | Mod: ORL | Performed by: FAMILY MEDICINE

## 2021-10-29 LAB — DEPRECATED CALCIDIOL+CALCIFEROL SERPL-MC: 23 UG/L (ref 30–80)

## 2022-01-12 ENCOUNTER — APPOINTMENT (OUTPATIENT)
Dept: MRI IMAGING | Facility: HOSPITAL | Age: 82
End: 2022-01-12
Attending: EMERGENCY MEDICINE
Payer: COMMERCIAL

## 2022-01-12 ENCOUNTER — APPOINTMENT (OUTPATIENT)
Dept: ULTRASOUND IMAGING | Facility: HOSPITAL | Age: 82
End: 2022-01-12
Attending: EMERGENCY MEDICINE
Payer: COMMERCIAL

## 2022-01-12 ENCOUNTER — APPOINTMENT (OUTPATIENT)
Dept: CT IMAGING | Facility: HOSPITAL | Age: 82
End: 2022-01-12
Attending: EMERGENCY MEDICINE
Payer: COMMERCIAL

## 2022-01-12 ENCOUNTER — HOSPITAL ENCOUNTER (EMERGENCY)
Facility: HOSPITAL | Age: 82
Discharge: HOME | End: 2022-01-12
Attending: EMERGENCY MEDICINE | Admitting: EMERGENCY MEDICINE
Payer: COMMERCIAL

## 2022-01-12 VITALS
WEIGHT: 114 LBS | RESPIRATION RATE: 16 BRPM | DIASTOLIC BLOOD PRESSURE: 68 MMHG | SYSTOLIC BLOOD PRESSURE: 144 MMHG | TEMPERATURE: 97.8 F | BODY MASS INDEX: 19.57 KG/M2 | OXYGEN SATURATION: 95 % | HEART RATE: 83 BPM

## 2022-01-12 DIAGNOSIS — K83.8 INTRAHEPATIC BILE DUCT DILATION: ICD-10-CM

## 2022-01-12 DIAGNOSIS — R79.89 ELEVATED LFTS: ICD-10-CM

## 2022-01-12 DIAGNOSIS — K59.00 CONSTIPATION, UNSPECIFIED CONSTIPATION TYPE: ICD-10-CM

## 2022-01-12 PROBLEM — E78.2 MIXED HYPERLIPIDEMIA: Status: ACTIVE | Noted: 2022-01-12

## 2022-01-12 PROBLEM — G62.0 DRUG-INDUCED POLYNEUROPATHY (H): Status: ACTIVE | Noted: 2022-01-12

## 2022-01-12 PROBLEM — H54.8 LEGAL BLINDNESS, AS DEFINED IN UNITED STATES OF AMERICA: Status: ACTIVE | Noted: 2022-01-12

## 2022-01-12 PROBLEM — F51.01 PRIMARY INSOMNIA: Status: ACTIVE | Noted: 2022-01-12

## 2022-01-12 PROBLEM — Z79.891 LONG TERM (CURRENT) USE OF OPIATE ANALGESIC: Status: ACTIVE | Noted: 2022-01-12

## 2022-01-12 PROBLEM — H40.9 GLAUCOMA: Status: ACTIVE | Noted: 2022-01-12

## 2022-01-12 PROBLEM — M85.80 OSTEOPENIA: Status: ACTIVE | Noted: 2022-01-12

## 2022-01-12 PROBLEM — Z91.81 HISTORY OF FALL: Status: ACTIVE | Noted: 2022-01-12

## 2022-01-12 PROBLEM — E55.9 VITAMIN D DEFICIENCY: Status: ACTIVE | Noted: 2022-01-12

## 2022-01-12 PROBLEM — C44.42 SQUAMOUS CELL CARCINOMA OF SKIN OF NECK: Status: ACTIVE | Noted: 2022-01-12

## 2022-01-12 PROBLEM — Z79.82 LONG TERM (CURRENT) USE OF ASPIRIN: Status: ACTIVE | Noted: 2022-01-12

## 2022-01-12 PROBLEM — Z87.39 HISTORY OF MUSCULOSKELETAL DISEASE: Status: ACTIVE | Noted: 2022-01-12

## 2022-01-12 PROBLEM — M51.369 DEGENERATION OF LUMBAR INTERVERTEBRAL DISC: Status: ACTIVE | Noted: 2022-01-12

## 2022-01-12 PROBLEM — I10 ESSENTIAL HYPERTENSION: Status: ACTIVE | Noted: 2022-01-12

## 2022-01-12 LAB
ALBUMIN SERPL-MCNC: 3.2 G/DL (ref 3.5–5)
ALP SERPL-CCNC: 160 U/L (ref 45–120)
ALT SERPL W P-5'-P-CCNC: 181 U/L (ref 0–45)
ANION GAP SERPL CALCULATED.3IONS-SCNC: 8 MMOL/L (ref 5–18)
AST SERPL W P-5'-P-CCNC: 151 U/L (ref 0–40)
BASOPHILS # BLD AUTO: 0 10E3/UL (ref 0–0.2)
BASOPHILS NFR BLD AUTO: 0 %
BILIRUB DIRECT SERPL-MCNC: 0.3 MG/DL
BILIRUB SERPL-MCNC: 0.9 MG/DL (ref 0–1)
BUN SERPL-MCNC: 14 MG/DL (ref 8–28)
C REACTIVE PROTEIN LHE: 9.4 MG/DL (ref 0–0.8)
CALCIUM SERPL-MCNC: 9 MG/DL (ref 8.5–10.5)
CHLORIDE BLD-SCNC: 98 MMOL/L (ref 98–107)
CO2 SERPL-SCNC: 25 MMOL/L (ref 22–31)
CREAT SERPL-MCNC: 0.69 MG/DL (ref 0.6–1.1)
EOSINOPHIL # BLD AUTO: 0 10E3/UL (ref 0–0.7)
EOSINOPHIL NFR BLD AUTO: 0 %
ERYTHROCYTE [DISTWIDTH] IN BLOOD BY AUTOMATED COUNT: 14.6 % (ref 10–15)
GFR SERPL CREATININE-BSD FRML MDRD: 87 ML/MIN/1.73M2
GLUCOSE BLD-MCNC: 149 MG/DL (ref 70–125)
HCT VFR BLD AUTO: 41 % (ref 35–47)
HGB BLD-MCNC: 13.8 G/DL (ref 11.7–15.7)
IMM GRANULOCYTES # BLD: 0 10E3/UL
IMM GRANULOCYTES NFR BLD: 0 %
LACTATE SERPL-SCNC: 1.7 MMOL/L (ref 0.7–2)
LIPASE SERPL-CCNC: <9 U/L (ref 0–52)
LYMPHOCYTES # BLD AUTO: 0.8 10E3/UL (ref 0.8–5.3)
LYMPHOCYTES NFR BLD AUTO: 10 %
MAGNESIUM SERPL-MCNC: 2 MG/DL (ref 1.8–2.6)
MCH RBC QN AUTO: 33.7 PG (ref 26.5–33)
MCHC RBC AUTO-ENTMCNC: 33.7 G/DL (ref 31.5–36.5)
MCV RBC AUTO: 100 FL (ref 78–100)
MONOCYTES # BLD AUTO: 0.6 10E3/UL (ref 0–1.3)
MONOCYTES NFR BLD AUTO: 8 %
NEUTROPHILS # BLD AUTO: 6.2 10E3/UL (ref 1.6–8.3)
NEUTROPHILS NFR BLD AUTO: 82 %
NRBC # BLD AUTO: 0 10E3/UL
NRBC BLD AUTO-RTO: 0 /100
PLATELET # BLD AUTO: 177 10E3/UL (ref 150–450)
POTASSIUM BLD-SCNC: 3.3 MMOL/L (ref 3.5–5)
PROT SERPL-MCNC: 8.8 G/DL (ref 6–8)
RBC # BLD AUTO: 4.1 10E6/UL (ref 3.8–5.2)
SODIUM SERPL-SCNC: 131 MMOL/L (ref 136–145)
WBC # BLD AUTO: 7.6 10E3/UL (ref 4–11)

## 2022-01-12 PROCEDURE — 74177 CT ABD & PELVIS W/CONTRAST: CPT

## 2022-01-12 PROCEDURE — 80053 COMPREHEN METABOLIC PANEL: CPT | Performed by: EMERGENCY MEDICINE

## 2022-01-12 PROCEDURE — 83735 ASSAY OF MAGNESIUM: CPT | Performed by: EMERGENCY MEDICINE

## 2022-01-12 PROCEDURE — 82248 BILIRUBIN DIRECT: CPT | Performed by: EMERGENCY MEDICINE

## 2022-01-12 PROCEDURE — 96372 THER/PROPH/DIAG INJ SC/IM: CPT | Performed by: EMERGENCY MEDICINE

## 2022-01-12 PROCEDURE — 74183 MRI ABD W/O CNTR FLWD CNTR: CPT

## 2022-01-12 PROCEDURE — 83605 ASSAY OF LACTIC ACID: CPT | Performed by: EMERGENCY MEDICINE

## 2022-01-12 PROCEDURE — 76705 ECHO EXAM OF ABDOMEN: CPT

## 2022-01-12 PROCEDURE — 86140 C-REACTIVE PROTEIN: CPT | Performed by: EMERGENCY MEDICINE

## 2022-01-12 PROCEDURE — 250N000011 HC RX IP 250 OP 636: Performed by: EMERGENCY MEDICINE

## 2022-01-12 PROCEDURE — 99285 EMERGENCY DEPT VISIT HI MDM: CPT | Mod: 25

## 2022-01-12 PROCEDURE — 96374 THER/PROPH/DIAG INJ IV PUSH: CPT

## 2022-01-12 PROCEDURE — 36415 COLL VENOUS BLD VENIPUNCTURE: CPT | Performed by: EMERGENCY MEDICINE

## 2022-01-12 PROCEDURE — 83690 ASSAY OF LIPASE: CPT | Performed by: EMERGENCY MEDICINE

## 2022-01-12 PROCEDURE — 85025 COMPLETE CBC W/AUTO DIFF WBC: CPT | Performed by: EMERGENCY MEDICINE

## 2022-01-12 PROCEDURE — A9585 GADOBUTROL INJECTION: HCPCS | Performed by: EMERGENCY MEDICINE

## 2022-01-12 PROCEDURE — 255N000002 HC RX 255 OP 636: Performed by: EMERGENCY MEDICINE

## 2022-01-12 PROCEDURE — 250N000013 HC RX MED GY IP 250 OP 250 PS 637: Performed by: EMERGENCY MEDICINE

## 2022-01-12 RX ORDER — GADOBUTROL 604.72 MG/ML
5 INJECTION INTRAVENOUS ONCE
Status: COMPLETED | OUTPATIENT
Start: 2022-01-12 | End: 2022-01-12

## 2022-01-12 RX ORDER — POLYETHYLENE GLYCOL 3350 17 G/17G
1 POWDER, FOR SOLUTION ORAL DAILY
Qty: 527 G | Refills: 0 | Status: SHIPPED | OUTPATIENT
Start: 2022-01-12 | End: 2022-02-12

## 2022-01-12 RX ORDER — FENTANYL CITRATE 50 UG/ML
100 INJECTION, SOLUTION INTRAMUSCULAR; INTRAVENOUS ONCE
Status: COMPLETED | OUTPATIENT
Start: 2022-01-12 | End: 2022-01-12

## 2022-01-12 RX ORDER — IOPAMIDOL 755 MG/ML
100 INJECTION, SOLUTION INTRAVASCULAR ONCE
Status: COMPLETED | OUTPATIENT
Start: 2022-01-12 | End: 2022-01-12

## 2022-01-12 RX ADMIN — SODIUM PHOSPHATE, DIBASIC AND SODIUM PHOSPHATE, MONOBASIC 1 ENEMA: 7; 19 ENEMA RECTAL at 16:24

## 2022-01-12 RX ADMIN — GADOBUTROL 5 ML: 604.72 INJECTION INTRAVENOUS at 17:52

## 2022-01-12 RX ADMIN — FENTANYL CITRATE 100 MCG: 50 INJECTION INTRAMUSCULAR; INTRAVENOUS at 12:43

## 2022-01-12 RX ADMIN — IOPAMIDOL 100 ML: 755 INJECTION, SOLUTION INTRAVENOUS at 11:56

## 2022-01-12 RX ADMIN — METHYLNALTREXONE BROMIDE 8 MG: 12 INJECTION, SOLUTION SUBCUTANEOUS at 14:23

## 2022-01-12 ASSESSMENT — ENCOUNTER SYMPTOMS
ABDOMINAL DISTENTION: 1
VOMITING: 1
APPETITE CHANGE: 1
ABDOMINAL PAIN: 1
CONSTIPATION: 1
NAUSEA: 1

## 2022-01-12 NOTE — ED TRIAGE NOTES
Pt is legally blind. Arrives per medics pt reports constipated with N/V last BM Monday required an enema. Currently takes oxycontin BID for multiple meyloma, last chemo last Thursday.

## 2022-01-12 NOTE — ED PROVIDER NOTES
EMERGENCY DEPARTMENT ENCOUNTER       ED Course & Medical Decision Making     11:59 AM I met with the patient, obtained history, performed an initial exam, and discussed options and plan for diagnostics and treatment here in the ED.      I saw and examined the patient.  She was given some fentanyl and Zofran to help with her symptoms.  Diagnostics ordered.  Laboratory studies do show some elevation of her LFTs and alk phos.    Her CT scan shows diffuse amount of stool within the colon and surgical consultation is recommended.  I did speak with Dr. Simpson with general surgery who did not feel that this represented obstruction and recommended enemas, MiraLAX.  We discussed Relistore and her opiate use and there is no disagreement to giving this at this point.    She did have manual disimpaction here. She is receiving enemas without much success yet. GI has been paged for the liver abnormalities . MRCP is pending and she is signed out at shif change to Dr. Wheat        Prior to making a final disposition on this patient the results of patient's tests and other diagnostic studies were discussed with the patient. All questions were answered. Patient expressed understanding of the plan and was amenable to it.    Medications   sodium phosphate (FLEET ENEMA) 1 enema (has no administration in time range)   iopamidol (ISOVUE-370) solution 100 mL (100 mLs Intravenous Given 1/12/22 1156)   fentaNYL (PF) (SUBLIMAZE) injection 100 mcg (100 mcg Intravenous Given 1/12/22 1243)   methylnaltrexone (RELISTOR) injection 8 mg (8 mg Subcutaneous Given 1/12/22 1423)       Final Impression     1. Constipation, unspecified constipation type    2. Elevated LFTs    3. Intrahepatic bile duct dilation            Chief Complaint     Chief Complaint   Patient presents with     Abdominal Pain       Pt is legally blind. Arrives per medics pt reports constipated with N/V last BM Monday required an enema. Currently takes oxycontin BID for multiple  meyloma, last chemo last Thursday.        HPI       Amelia Fry is a 81 year old female s/p appendectomy who presents for evaluation of abdominal pain.     The patient reports right lower abdominal pain fluctuating in severity from 8/10 to 10/10 and improved with laying down with accompanying abdominal distension. She has been having constipation for the last two weeks and low appetite. She drank some prune juice yesterday but them vomited three times. She is not urinating as much because she is not drinking much. The patient is on chemotherapy for multiple myeloma and takes oxycotin twice daily. She had her 0800 dose this morning but is still in pain. She is allergic to codeine. She denies chest pain and other medical complaints.     IMelanie am serving as a scribe to document services personally performed by Travis Ledesma M.D. based on my observation and the provider's statements to me. ITravis M.D attest that Melanie Siu is acting in a scribe capacity, has observed my performance of the services and has documented them in accordance with my direction.    Past Medical History     No past medical history on file.  Past Surgical History:   Procedure Laterality Date     EYE SURGERY       No family history on file.   Social History     Tobacco Use     Smoking status: Never Smoker     Smokeless tobacco: Never Used   Substance Use Topics     Alcohol use: Yes     Comment: Alcoholic Drinks/day: a few glasses of wine a month     Drug use: No       Relevant past medical, surgical, family and social history as documented above, has been reviewed and discussed with patient. No changes or additions, unless otherwise noted in the HPI.    Current Medications     acetaminophen (TYLENOL) 325 MG tablet  aspirin 81 mg chewable tablet  brimonidine (ALPHAGAN P) 0.1 % ophthalmic solution  cholecalciferol 25 MCG (1000 UT) TABS  cholestyramine-aspartame, sugar free, (CHOLESTYRAMINE LIGHT) 4 gram  "PwPk  diphenoxylate-atropine (LOMOTIL) 2.5-0.025 MG tablet  dorzolamide-timolol (COSOPT) 2-0.5 % ophthalmic solution  gabapentin (NEURONTIN) 300 MG capsule  levothyroxine (SYNTHROID, LEVOTHROID) 100 MCG tablet  loperamide (IMODIUM) 2 MG capsule  loperamide (IMODIUM) 2 MG capsule  MELALEUCA SC  netarsudil (RHOPRESSA) 0.02 % ophthalmic solution  potassium chloride SA (K-DUR,KLOR-CON) 10 MEQ tablet  tafluprost, PF, (ZIOPTAN, PF,) 0.0015 % Dpet ophthalmic dropperette  traMADol (ULTRAM) 50 MG tablet  traMADol (ULTRAM) 50 mg tablet  vitamin E 400 unit capsule        Allergies     Allergies   Allergen Reactions     Codeine Anaphylaxis     Chest pressure, shortness of breath     External Allergen Needs Reconciliation - See Comment Unknown     UNABLE TO ASSESS, States \"all opiates.\"       Review of Systems     Review of Systems   Constitutional: Positive for appetite change (decreased).   Cardiovascular: Negative for chest pain.   Gastrointestinal: Positive for abdominal distention, abdominal pain, constipation, nausea and vomiting.   Genitourinary: Negative for decreased urine volume.   All other systems reviewed and are negative.       Remainder of systems reviewed, unless noted in HPI all others negative.    Physical Exam     /63   Pulse 80   Temp 97.8  F (36.6  C)   Resp 16   Wt 51.7 kg (114 lb)   SpO2 97%   BMI 19.57 kg/m      Physical Exam        Labs & Imaging         Labs Ordered and Resulted from Time of ED Arrival to Time of ED Departure   CRP INFLAMMATION - Abnormal       Result Value    CRP 9.4 (*)    HEPATIC FUNCTION PANEL - Abnormal    Bilirubin Total 0.9      Bilirubin Direct 0.3      Protein Total 8.8 (*)     Albumin 3.2 (*)     Alkaline Phosphatase 160 (*)      (*)      (*)    BASIC METABOLIC PANEL - Abnormal    Sodium 131 (*)     Potassium 3.3 (*)     Chloride 98      Carbon Dioxide (CO2) 25      Anion Gap 8      Urea Nitrogen 14      Creatinine 0.69      Calcium 9.0      Glucose " 149 (*)     GFR Estimate 87     CBC WITH PLATELETS AND DIFFERENTIAL - Abnormal    WBC Count 7.6      RBC Count 4.10      Hemoglobin 13.8      Hematocrit 41.0            MCH 33.7 (*)     MCHC 33.7      RDW 14.6      Platelet Count 177      % Neutrophils 82      % Lymphocytes 10      % Monocytes 8      % Eosinophils 0      % Basophils 0      % Immature Granulocytes 0      NRBCs per 100 WBC 0      Absolute Neutrophils 6.2      Absolute Lymphocytes 0.8      Absolute Monocytes 0.6      Absolute Eosinophils 0.0      Absolute Basophils 0.0      Absolute Immature Granulocytes 0.0      Absolute NRBCs 0.0     MAGNESIUM - Normal    Magnesium 2.0     LIPASE - Normal    Lipase <9     LACTIC ACID WHOLE BLOOD - Normal    Lactic Acid 1.7     ROUTINE UA WITH MICROSCOPIC REFLEX TO CULTURE         Results for orders placed or performed during the hospital encounter of 01/12/22   CT Abdomen Pelvis w Contrast    Impression    IMPRESSION:   1.  Massive amount of stool throughout the colon and rectum with circumferential submucosal edema throughout the distal sigmoid colon and mid and upper rectum, edema in the mesorectum and sigmoid mesenteric fat and trace ascites. Some degree of   obstruction may be present. Recommend surgical consultation.  2.  The anterior extra osseous extension of the sacral myeloma into the prevertebral soft tissue and posterior mesorectal fat could be having some mass effect on the rectum.  3.  Visible myeloma with pathologic fractures involving the T12-L4 vertebral bodies and lower ribs.    Abdomen US, limited (RUQ only)    Impression    IMPRESSION:  1.  Moderate intra and extra hepatic biliary ductal dilatation and mild pancreatic ductal dilatation. No cause for obstruction is identified on this exam. Further evaluation with MRCP may be helpful.       CRP inflammation   Result Value Ref Range    CRP 9.4 (H) 0.0-<0.8 mg/dL   Result Value Ref Range    Magnesium 2.0 1.8 - 2.6 mg/dL   Hepatic function panel    Result Value Ref Range    Bilirubin Total 0.9 0.0 - 1.0 mg/dL    Bilirubin Direct 0.3 <=0.5 mg/dL    Protein Total 8.8 (H) 6.0 - 8.0 g/dL    Albumin 3.2 (L) 3.5 - 5.0 g/dL    Alkaline Phosphatase 160 (H) 45 - 120 U/L     (H) 0 - 40 U/L     (H) 0 - 45 U/L   Result Value Ref Range    Lipase <9 0 - 52 U/L   Basic metabolic panel   Result Value Ref Range    Sodium 131 (L) 136 - 145 mmol/L    Potassium 3.3 (L) 3.5 - 5.0 mmol/L    Chloride 98 98 - 107 mmol/L    Carbon Dioxide (CO2) 25 22 - 31 mmol/L    Anion Gap 8 5 - 18 mmol/L    Urea Nitrogen 14 8 - 28 mg/dL    Creatinine 0.69 0.60 - 1.10 mg/dL    Calcium 9.0 8.5 - 10.5 mg/dL    Glucose 149 (H) 70 - 125 mg/dL    GFR Estimate 87 >60 mL/min/1.73m2   Lactic acid whole blood   Result Value Ref Range    Lactic Acid 1.7 0.7 - 2.0 mmol/L   CBC with platelets and differential   Result Value Ref Range    WBC Count 7.6 4.0 - 11.0 10e3/uL    RBC Count 4.10 3.80 - 5.20 10e6/uL    Hemoglobin 13.8 11.7 - 15.7 g/dL    Hematocrit 41.0 35.0 - 47.0 %     78 - 100 fL    MCH 33.7 (H) 26.5 - 33.0 pg    MCHC 33.7 31.5 - 36.5 g/dL    RDW 14.6 10.0 - 15.0 %    Platelet Count 177 150 - 450 10e3/uL    % Neutrophils 82 %    % Lymphocytes 10 %    % Monocytes 8 %    % Eosinophils 0 %    % Basophils 0 %    % Immature Granulocytes 0 %    NRBCs per 100 WBC 0 <1 /100    Absolute Neutrophils 6.2 1.6 - 8.3 10e3/uL    Absolute Lymphocytes 0.8 0.8 - 5.3 10e3/uL    Absolute Monocytes 0.6 0.0 - 1.3 10e3/uL    Absolute Eosinophils 0.0 0.0 - 0.7 10e3/uL    Absolute Basophils 0.0 0.0 - 0.2 10e3/uL    Absolute Immature Granulocytes 0.0 <=0.4 10e3/uL    Absolute NRBCs 0.0 10e3/uL       Travis Ledesma MD  Emergency Medicine  [facility] EMERGENCY DEPARTMENT  [address] [place] [id number]  [phone]  1/12/2022       Travis Ledesma MD  01/12/22 1545       Travis Ledesma MD  01/12/22 2052

## 2022-01-12 NOTE — ED PROVIDER NOTES
EMERGENCY DEPARTMENT SIGN OUT NOTE        ED COURSE AND MEDICAL DECISION MAKING  Patient was signed out to me by Dr Travis Ledesma at 4:00 PM    In brief, Amelia Fry is a 81 year old female who initially presented with abdominal distention and right lower abdominal pain rated between 8/10 to 10/10; improved while laying down. She has been having constipation for the last two weeks and low appetite. She drank some prune juice yesterday but then vomited three times. She is not urinating as much because she is not drinking much. The patient is on chemotherapy for multiple myeloma and takes oxycotin twice daily. She had her 0800 dose this morning but is still in pain.    ED Course as of 01/12/22 1931 Wed Jan 12, 2022   1602 Pt with ductal dilitation of biliary system with possibililty speculated of occult pancreatic mass per GI Dr Black, LFT elevation, pending MRCP and plan to dispo per [facility] with whom pt follows re: chronic abdominal pain, constipation   1855 MCRP reassuring, [facility] paged to discuss case, will reassess patietn now   1857 I reassessed patient who has son at bedside, patient notes she did stool after enema and after disimpaction, she notes she feels much improved. She notes she feels safe with plan to dc to home with son if GI ok with that plan. She notes she hasn't had regular BM for many years, amenable to trying miralax which is a new thing for her and plan to f/u with GI who she hasn't seen recently.   1927 I spokew tih Dr Wei from [facility] who reviewed case and notes ok for discharge and will f/u with Mihir re: slight elevated transaminases and with plan to f/u with patient tomorrow. Patient discharged after being provided with extensive anticipatory guidance and given return precautions, importance of PMD follow-up emphasized.          FINAL IMPRESSION    1. Constipation, unspecified constipation type    2. Elevated LFTs    3. Intrahepatic bile duct dilation        ED MEDS  Medications    iopamidol (ISOVUE-370) solution 100 mL (100 mLs Intravenous Given 1/12/22 1156)   fentaNYL (PF) (SUBLIMAZE) injection 100 mcg (100 mcg Intravenous Given 1/12/22 1243)   methylnaltrexone (RELISTOR) injection 8 mg (8 mg Subcutaneous Given 1/12/22 1423)   sodium phosphate (FLEET ENEMA) 1 enema (1 enema Rectal Given 1/12/22 1624)   gadobutrol (GADAVIST) injection 5 mL (5 mLs Intravenous Given 1/12/22 0972)       LAB  Labs Ordered and Resulted from Time of ED Arrival to Time of ED Departure   CRP INFLAMMATION - Abnormal       Result Value    CRP 9.4 (*)    HEPATIC FUNCTION PANEL - Abnormal    Bilirubin Total 0.9      Bilirubin Direct 0.3      Protein Total 8.8 (*)     Albumin 3.2 (*)     Alkaline Phosphatase 160 (*)      (*)      (*)    BASIC METABOLIC PANEL - Abnormal    Sodium 131 (*)     Potassium 3.3 (*)     Chloride 98      Carbon Dioxide (CO2) 25      Anion Gap 8      Urea Nitrogen 14      Creatinine 0.69      Calcium 9.0      Glucose 149 (*)     GFR Estimate 87     CBC WITH PLATELETS AND DIFFERENTIAL - Abnormal    WBC Count 7.6      RBC Count 4.10      Hemoglobin 13.8      Hematocrit 41.0            MCH 33.7 (*)     MCHC 33.7      RDW 14.6      Platelet Count 177      % Neutrophils 82      % Lymphocytes 10      % Monocytes 8      % Eosinophils 0      % Basophils 0      % Immature Granulocytes 0      NRBCs per 100 WBC 0      Absolute Neutrophils 6.2      Absolute Lymphocytes 0.8      Absolute Monocytes 0.6      Absolute Eosinophils 0.0      Absolute Basophils 0.0      Absolute Immature Granulocytes 0.0      Absolute NRBCs 0.0     MAGNESIUM - Normal    Magnesium 2.0     LIPASE - Normal    Lipase <9     LACTIC ACID WHOLE BLOOD - Normal    Lactic Acid 1.7     ROUTINE UA WITH MICROSCOPIC REFLEX TO CULTURE       EKG      RADIOLOGY    MR Abdomen MRCP w/o & w Contrast   Final Result   IMPRESSION:   1.  Modest extrahepatic bile duct dilatation centered about the common hepatic duct which measures 10  mm. No obstructing stone or lesion evident. This may be a congenital variation. Distal CBD within pancreas appears normal.      Abdomen US, limited (RUQ only)   Final Result   IMPRESSION:   1.  Moderate intra and extra hepatic biliary ductal dilatation and mild pancreatic ductal dilatation. No cause for obstruction is identified on this exam. Further evaluation with MRCP may be helpful.            CT Abdomen Pelvis w Contrast   Final Result   IMPRESSION:    1.  Massive amount of stool throughout the colon and rectum with circumferential submucosal edema throughout the distal sigmoid colon and mid and upper rectum, edema in the mesorectum and sigmoid mesenteric fat and trace ascites. Some degree of    obstruction may be present. Recommend surgical consultation.   2.  The anterior extra osseous extension of the sacral myeloma into the prevertebral soft tissue and posterior mesorectal fat could be having some mass effect on the rectum.   3.  Visible myeloma with pathologic fractures involving the T12-L4 vertebral bodies and lower ribs.           DISCHARGE MEDS  New Prescriptions    POLYETHYLENE GLYCOL (MIRALAX) 17 GM/DOSE POWDER    Take 17 g (1 capful) by mouth daily          Monica Wheat MD  01/12/22 1931

## 2022-01-12 NOTE — ED NOTES
Administered Fentanyl, patient's O2 dropped to 87%, administered O2 via nasal cannula at 1L which was effective. O2 sats 94%

## 2022-01-12 NOTE — ED NOTES
Suds enema somewhat effective, manually extracted hard stool from rectum, patient states some relief although has abdominal cramping.

## 2022-01-17 ENCOUNTER — HOSPITAL ENCOUNTER (INPATIENT)
Facility: HOSPITAL | Age: 82
LOS: 3 days | Discharge: HOME | DRG: 177 | End: 2022-01-20
Attending: EMERGENCY MEDICINE | Admitting: HOSPITALIST
Payer: COMMERCIAL

## 2022-01-17 ENCOUNTER — APPOINTMENT (OUTPATIENT)
Dept: CT IMAGING | Facility: HOSPITAL | Age: 82
DRG: 177 | End: 2022-01-17
Payer: COMMERCIAL

## 2022-01-17 DIAGNOSIS — J69.0 ASPIRATION PNEUMONITIS (H): ICD-10-CM

## 2022-01-17 DIAGNOSIS — J69.0 ASPIRATION PNEUMONIA (H): ICD-10-CM

## 2022-01-17 DIAGNOSIS — K59.03 DRUG-INDUCED CONSTIPATION: ICD-10-CM

## 2022-01-17 DIAGNOSIS — E56.9 VITAMIN DEFICIENCY: Primary | ICD-10-CM

## 2022-01-17 DIAGNOSIS — C90.02 MULTIPLE MYELOMA IN RELAPSE (H): ICD-10-CM

## 2022-01-17 DIAGNOSIS — R05.9 COUGH: ICD-10-CM

## 2022-01-17 DIAGNOSIS — R09.02 HYPOXIA: ICD-10-CM

## 2022-01-17 DIAGNOSIS — R41.82 ALTERED MENTAL STATUS: ICD-10-CM

## 2022-01-17 DIAGNOSIS — G89.4 CHRONIC PAIN SYNDROME: ICD-10-CM

## 2022-01-17 PROBLEM — G93.41 ACUTE METABOLIC ENCEPHALOPATHY: Status: ACTIVE | Noted: 2022-01-17

## 2022-01-17 PROBLEM — J12.82 PNEUMONIA DUE TO 2019 NOVEL CORONAVIRUS: Status: ACTIVE | Noted: 2022-01-17

## 2022-01-17 PROBLEM — J96.01 ACUTE RESPIRATORY FAILURE WITH HYPOXIA (H): Status: ACTIVE | Noted: 2022-01-17

## 2022-01-17 PROBLEM — U07.1 PNEUMONIA DUE TO 2019 NOVEL CORONAVIRUS: Status: ACTIVE | Noted: 2022-01-17

## 2022-01-17 LAB
ALBUMIN SERPL-MCNC: 3.1 G/DL (ref 3.5–5)
ALBUMIN UR-MCNC: 30 MG/DL
ALP SERPL-CCNC: 89 U/L (ref 45–120)
ALT SERPL W P-5'-P-CCNC: 31 U/L (ref 0–45)
AMORPH CRY #/AREA URNS HPF: ABNORMAL /HPF
ANION GAP SERPL CALCULATED.3IONS-SCNC: 7 MMOL/L (ref 5–18)
APPEARANCE UR: ABNORMAL
APTT PPP: 38 SECONDS (ref 22–38)
AST SERPL W P-5'-P-CCNC: 25 U/L (ref 0–40)
BACTERIA #/AREA URNS HPF: ABNORMAL /HPF
BASOPHILS # BLD AUTO: 0 10E3/UL (ref 0–0.2)
BASOPHILS NFR BLD AUTO: 0 %
BILIRUB SERPL-MCNC: 0.5 MG/DL (ref 0–1)
BILIRUB UR QL STRIP: NEGATIVE
BNP SERPL-MCNC: 170 PG/ML (ref 0–155)
BUN SERPL-MCNC: 12 MG/DL (ref 8–28)
C REACTIVE PROTEIN LHE: 10.7 MG/DL (ref 0–0.8)
CALCIUM SERPL-MCNC: 8.5 MG/DL (ref 8.5–10.5)
CHLORIDE BLD-SCNC: 96 MMOL/L (ref 98–107)
CO2 SERPL-SCNC: 26 MMOL/L (ref 22–31)
COLOR UR AUTO: ABNORMAL
CREAT SERPL-MCNC: 0.7 MG/DL (ref 0.6–1.1)
D DIMER PPP FEU-MCNC: 2.16 UG/ML FEU (ref 0–0.5)
EOSINOPHIL # BLD AUTO: 0 10E3/UL (ref 0–0.7)
EOSINOPHIL NFR BLD AUTO: 0 %
ERYTHROCYTE [DISTWIDTH] IN BLOOD BY AUTOMATED COUNT: 14.5 % (ref 10–15)
FIBRINOGEN PPP-MCNC: 568 MG/DL (ref 170–490)
FLUAV RNA SPEC QL NAA+PROBE: NEGATIVE
FLUBV RNA RESP QL NAA+PROBE: NEGATIVE
GFR SERPL CREATININE-BSD FRML MDRD: 87 ML/MIN/1.73M2
GLUCOSE BLD-MCNC: 136 MG/DL (ref 70–125)
GLUCOSE UR STRIP-MCNC: NEGATIVE MG/DL
HCT VFR BLD AUTO: 38.5 % (ref 35–47)
HGB BLD-MCNC: 12.8 G/DL (ref 11.7–15.7)
HGB UR QL STRIP: ABNORMAL
HOLD SPECIMEN: NORMAL
IMM GRANULOCYTES # BLD: 0 10E3/UL
IMM GRANULOCYTES NFR BLD: 0 %
KETONES UR STRIP-MCNC: NEGATIVE MG/DL
LACTATE SERPL-SCNC: 0.9 MMOL/L (ref 0.7–2)
LEUKOCYTE ESTERASE UR QL STRIP: NEGATIVE
LYMPHOCYTES # BLD AUTO: 0.6 10E3/UL (ref 0.8–5.3)
LYMPHOCYTES NFR BLD AUTO: 15 %
MAGNESIUM SERPL-MCNC: 1.6 MG/DL (ref 1.8–2.6)
MCH RBC QN AUTO: 32.9 PG (ref 26.5–33)
MCHC RBC AUTO-ENTMCNC: 33.2 G/DL (ref 31.5–36.5)
MCV RBC AUTO: 99 FL (ref 78–100)
MONOCYTES # BLD AUTO: 0.3 10E3/UL (ref 0–1.3)
MONOCYTES NFR BLD AUTO: 8 %
MUCOUS THREADS #/AREA URNS LPF: PRESENT /LPF
NEUTROPHILS # BLD AUTO: 3.2 10E3/UL (ref 1.6–8.3)
NEUTROPHILS NFR BLD AUTO: 77 %
NITRATE UR QL: NEGATIVE
NRBC # BLD AUTO: 0 10E3/UL
NRBC BLD AUTO-RTO: 0 /100
PH UR STRIP: 7 [PH] (ref 5–7)
PLATELET # BLD AUTO: 170 10E3/UL (ref 150–450)
POTASSIUM BLD-SCNC: 3.1 MMOL/L (ref 3.5–5)
PROT SERPL-MCNC: 8.8 G/DL (ref 6–8)
RBC # BLD AUTO: 3.89 10E6/UL (ref 3.8–5.2)
RBC URINE: 45 /HPF
SARS-COV-2 RNA RESP QL NAA+PROBE: POSITIVE
SODIUM SERPL-SCNC: 129 MMOL/L (ref 136–145)
SODIUM UR-SCNC: 143 MMOL/L
SP GR UR STRIP: 1.01 (ref 1–1.03)
SQUAMOUS EPITHELIAL: 1 /HPF
TROPONIN I SERPL-MCNC: 0.02 NG/ML (ref 0–0.29)
UROBILINOGEN UR STRIP-MCNC: <2 MG/DL
WBC # BLD AUTO: 4.1 10E3/UL (ref 4–11)
WBC URINE: 7 /HPF

## 2022-01-17 PROCEDURE — 83935 ASSAY OF URINE OSMOLALITY: CPT | Performed by: HOSPITALIST

## 2022-01-17 PROCEDURE — 83880 ASSAY OF NATRIURETIC PEPTIDE: CPT | Performed by: EMERGENCY MEDICINE

## 2022-01-17 PROCEDURE — 71275 CT ANGIOGRAPHY CHEST: CPT

## 2022-01-17 PROCEDURE — 85384 FIBRINOGEN ACTIVITY: CPT | Performed by: HOSPITALIST

## 2022-01-17 PROCEDURE — 87636 SARSCOV2 & INF A&B AMP PRB: CPT | Performed by: EMERGENCY MEDICINE

## 2022-01-17 PROCEDURE — 96365 THER/PROPH/DIAG IV INF INIT: CPT

## 2022-01-17 PROCEDURE — 83605 ASSAY OF LACTIC ACID: CPT | Performed by: EMERGENCY MEDICINE

## 2022-01-17 PROCEDURE — 36415 COLL VENOUS BLD VENIPUNCTURE: CPT | Performed by: EMERGENCY MEDICINE

## 2022-01-17 PROCEDURE — 250N000011 HC RX IP 250 OP 636: Performed by: HOSPITALIST

## 2022-01-17 PROCEDURE — 85300 ANTITHROMBIN III ACTIVITY: CPT | Performed by: HOSPITALIST

## 2022-01-17 PROCEDURE — 85379 FIBRIN DEGRADATION QUANT: CPT | Performed by: EMERGENCY MEDICINE

## 2022-01-17 PROCEDURE — 93005 ELECTROCARDIOGRAM TRACING: CPT | Performed by: EMERGENCY MEDICINE

## 2022-01-17 PROCEDURE — 82040 ASSAY OF SERUM ALBUMIN: CPT | Performed by: EMERGENCY MEDICINE

## 2022-01-17 PROCEDURE — 99223 1ST HOSP IP/OBS HIGH 75: CPT | Performed by: HOSPITALIST

## 2022-01-17 PROCEDURE — XW033E5 INTRODUCTION OF REMDESIVIR ANTI-INFECTIVE INTO PERIPHERAL VEIN, PERCUTANEOUS APPROACH, NEW TECHNOLOGY GROUP 5: ICD-10-PCS | Performed by: HOSPITALIST

## 2022-01-17 PROCEDURE — C9803 HOPD COVID-19 SPEC COLLECT: HCPCS

## 2022-01-17 PROCEDURE — 86140 C-REACTIVE PROTEIN: CPT | Performed by: HOSPITALIST

## 2022-01-17 PROCEDURE — 96375 TX/PRO/DX INJ NEW DRUG ADDON: CPT

## 2022-01-17 PROCEDURE — 84300 ASSAY OF URINE SODIUM: CPT | Performed by: HOSPITALIST

## 2022-01-17 PROCEDURE — 96368 THER/DIAG CONCURRENT INF: CPT

## 2022-01-17 PROCEDURE — 96366 THER/PROPH/DIAG IV INF ADDON: CPT

## 2022-01-17 PROCEDURE — 85730 THROMBOPLASTIN TIME PARTIAL: CPT | Performed by: HOSPITALIST

## 2022-01-17 PROCEDURE — 84484 ASSAY OF TROPONIN QUANT: CPT | Performed by: EMERGENCY MEDICINE

## 2022-01-17 PROCEDURE — 250N000009 HC RX 250: Performed by: HOSPITALIST

## 2022-01-17 PROCEDURE — 96367 TX/PROPH/DG ADDL SEQ IV INF: CPT

## 2022-01-17 PROCEDURE — 83735 ASSAY OF MAGNESIUM: CPT | Performed by: EMERGENCY MEDICINE

## 2022-01-17 PROCEDURE — 85025 COMPLETE CBC W/AUTO DIFF WBC: CPT | Performed by: EMERGENCY MEDICINE

## 2022-01-17 PROCEDURE — 250N000011 HC RX IP 250 OP 636: Performed by: EMERGENCY MEDICINE

## 2022-01-17 PROCEDURE — 250N000013 HC RX MED GY IP 250 OP 250 PS 637: Performed by: EMERGENCY MEDICINE

## 2022-01-17 PROCEDURE — 81001 URINALYSIS AUTO W/SCOPE: CPT | Performed by: EMERGENCY MEDICINE

## 2022-01-17 PROCEDURE — 82728 ASSAY OF FERRITIN: CPT | Performed by: HOSPITALIST

## 2022-01-17 PROCEDURE — 36415 COLL VENOUS BLD VENIPUNCTURE: CPT | Performed by: HOSPITALIST

## 2022-01-17 PROCEDURE — 120N000001 HC R&B MED SURG/OB

## 2022-01-17 PROCEDURE — 87040 BLOOD CULTURE FOR BACTERIA: CPT | Performed by: EMERGENCY MEDICINE

## 2022-01-17 PROCEDURE — 99285 EMERGENCY DEPT VISIT HI MDM: CPT | Mod: 25

## 2022-01-17 PROCEDURE — 258N000003 HC RX IP 258 OP 636: Performed by: HOSPITALIST

## 2022-01-17 PROCEDURE — 80053 COMPREHEN METABOLIC PANEL: CPT | Performed by: EMERGENCY MEDICINE

## 2022-01-17 RX ORDER — NALOXONE HYDROCHLORIDE 0.4 MG/ML
0.2 INJECTION, SOLUTION INTRAMUSCULAR; INTRAVENOUS; SUBCUTANEOUS
Status: DISCONTINUED | OUTPATIENT
Start: 2022-01-17 | End: 2022-01-20 | Stop reason: HOSPADM

## 2022-01-17 RX ORDER — ALBUTEROL SULFATE 0.83 MG/ML
3 SOLUTION RESPIRATORY (INHALATION)
Status: DISCONTINUED | OUTPATIENT
Start: 2022-01-17 | End: 2022-01-20 | Stop reason: HOSPADM

## 2022-01-17 RX ORDER — SODIUM CHLORIDE AND POTASSIUM CHLORIDE 150; 900 MG/100ML; MG/100ML
INJECTION, SOLUTION INTRAVENOUS CONTINUOUS
Status: DISPENSED | OUTPATIENT
Start: 2022-01-17 | End: 2022-01-18

## 2022-01-17 RX ORDER — AMPICILLIN AND SULBACTAM 2; 1 G/1; G/1
3 INJECTION, POWDER, FOR SOLUTION INTRAMUSCULAR; INTRAVENOUS EVERY 6 HOURS
Status: DISPENSED | OUTPATIENT
Start: 2022-01-17 | End: 2022-01-19

## 2022-01-17 RX ORDER — AMPICILLIN AND SULBACTAM 2; 1 G/1; G/1
3 INJECTION, POWDER, FOR SOLUTION INTRAMUSCULAR; INTRAVENOUS ONCE
Status: COMPLETED | OUTPATIENT
Start: 2022-01-17 | End: 2022-01-17

## 2022-01-17 RX ORDER — AMPICILLIN AND SULBACTAM 2; 1 G/1; G/1
3 INJECTION, POWDER, FOR SOLUTION INTRAMUSCULAR; INTRAVENOUS EVERY 8 HOURS
Status: DISCONTINUED | OUTPATIENT
Start: 2022-01-18 | End: 2022-01-17

## 2022-01-17 RX ORDER — ONDANSETRON 4 MG/1
4 TABLET, ORALLY DISINTEGRATING ORAL EVERY 6 HOURS PRN
Status: DISCONTINUED | OUTPATIENT
Start: 2022-01-17 | End: 2022-01-20 | Stop reason: HOSPADM

## 2022-01-17 RX ORDER — LIDOCAINE 40 MG/G
CREAM TOPICAL
Status: DISCONTINUED | OUTPATIENT
Start: 2022-01-17 | End: 2022-01-20 | Stop reason: HOSPADM

## 2022-01-17 RX ORDER — DORZOLAMIDE HYDROCHLORIDE AND TIMOLOL MALEATE 20; 5 MG/ML; MG/ML
1 SOLUTION/ DROPS OPHTHALMIC 2 TIMES DAILY
Status: DISCONTINUED | OUTPATIENT
Start: 2022-01-17 | End: 2022-01-20 | Stop reason: HOSPADM

## 2022-01-17 RX ORDER — BRIMONIDINE TARTRATE 1 MG/ML
1 SOLUTION/ DROPS OPHTHALMIC 2 TIMES DAILY
Status: DISCONTINUED | OUTPATIENT
Start: 2022-01-17 | End: 2022-01-20 | Stop reason: HOSPADM

## 2022-01-17 RX ORDER — ACETAMINOPHEN 325 MG/1
650 TABLET ORAL EVERY 6 HOURS PRN
Status: DISCONTINUED | OUTPATIENT
Start: 2022-01-17 | End: 2022-01-17

## 2022-01-17 RX ORDER — IOPAMIDOL 755 MG/ML
100 INJECTION, SOLUTION INTRAVASCULAR ONCE
Status: COMPLETED | OUTPATIENT
Start: 2022-01-17 | End: 2022-01-17

## 2022-01-17 RX ORDER — ACETAMINOPHEN 650 MG/1
650 SUPPOSITORY RECTAL ONCE
Status: COMPLETED | OUTPATIENT
Start: 2022-01-17 | End: 2022-01-17

## 2022-01-17 RX ORDER — MAGNESIUM SULFATE HEPTAHYDRATE 40 MG/ML
2 INJECTION, SOLUTION INTRAVENOUS ONCE
Status: COMPLETED | OUTPATIENT
Start: 2022-01-17 | End: 2022-01-18

## 2022-01-17 RX ORDER — NALOXONE HYDROCHLORIDE 0.4 MG/ML
0.4 INJECTION, SOLUTION INTRAMUSCULAR; INTRAVENOUS; SUBCUTANEOUS
Status: DISCONTINUED | OUTPATIENT
Start: 2022-01-17 | End: 2022-01-20 | Stop reason: HOSPADM

## 2022-01-17 RX ORDER — ONDANSETRON 8 MG/1
8 TABLET, FILM COATED ORAL EVERY 8 HOURS PRN
COMMUNITY

## 2022-01-17 RX ORDER — PROCHLORPERAZINE MALEATE 5 MG
5 TABLET ORAL EVERY 6 HOURS PRN
Status: DISCONTINUED | OUTPATIENT
Start: 2022-01-17 | End: 2022-01-20 | Stop reason: HOSPADM

## 2022-01-17 RX ORDER — OXYCODONE HYDROCHLORIDE 30 MG/1
30 TABLET, FILM COATED, EXTENDED RELEASE ORAL EVERY 12 HOURS
Status: ON HOLD | COMMUNITY
End: 2022-01-20

## 2022-01-17 RX ORDER — CARBOXYMETHYLCELLULOSE SODIUM 5 MG/ML
1 SOLUTION/ DROPS OPHTHALMIC
Status: DISCONTINUED | OUTPATIENT
Start: 2022-01-17 | End: 2022-01-20 | Stop reason: HOSPADM

## 2022-01-17 RX ORDER — DEXAMETHASONE SODIUM PHOSPHATE 4 MG/ML
6 INJECTION, SOLUTION INTRA-ARTICULAR; INTRALESIONAL; INTRAMUSCULAR; INTRAVENOUS; SOFT TISSUE DAILY
Status: DISCONTINUED | OUTPATIENT
Start: 2022-01-17 | End: 2022-01-20 | Stop reason: HOSPADM

## 2022-01-17 RX ORDER — POTASSIUM CHLORIDE 7.45 MG/ML
10 INJECTION INTRAVENOUS ONCE
Status: COMPLETED | OUTPATIENT
Start: 2022-01-17 | End: 2022-01-18

## 2022-01-17 RX ORDER — ONDANSETRON 2 MG/ML
4 INJECTION INTRAMUSCULAR; INTRAVENOUS EVERY 6 HOURS PRN
Status: DISCONTINUED | OUTPATIENT
Start: 2022-01-17 | End: 2022-01-20 | Stop reason: HOSPADM

## 2022-01-17 RX ORDER — SENNOSIDES A AND B 8.6 MG/1
1-2 TABLET, FILM COATED ORAL 2 TIMES DAILY
Status: ON HOLD | COMMUNITY
End: 2022-01-20

## 2022-01-17 RX ORDER — ENOXAPARIN SODIUM 300 MG/3ML
0.5 INJECTION INTRAVENOUS; SUBCUTANEOUS 2 TIMES DAILY
Status: DISCONTINUED | OUTPATIENT
Start: 2022-01-17 | End: 2022-01-18

## 2022-01-17 RX ORDER — PROCHLORPERAZINE 25 MG
12.5 SUPPOSITORY, RECTAL RECTAL EVERY 12 HOURS PRN
Status: DISCONTINUED | OUTPATIENT
Start: 2022-01-17 | End: 2022-01-20 | Stop reason: HOSPADM

## 2022-01-17 RX ORDER — MORPHINE SULFATE 2 MG/ML
2 INJECTION, SOLUTION INTRAMUSCULAR; INTRAVENOUS
Status: DISCONTINUED | OUTPATIENT
Start: 2022-01-17 | End: 2022-01-19

## 2022-01-17 RX ORDER — ACETAMINOPHEN 650 MG/1
650 SUPPOSITORY RECTAL EVERY 6 HOURS PRN
Status: DISCONTINUED | OUTPATIENT
Start: 2022-01-17 | End: 2022-01-20 | Stop reason: HOSPADM

## 2022-01-17 RX ADMIN — REMDESIVIR 200 MG: 100 INJECTION, POWDER, LYOPHILIZED, FOR SOLUTION INTRAVENOUS at 23:48

## 2022-01-17 RX ADMIN — POTASSIUM CHLORIDE AND SODIUM CHLORIDE: 900; 150 INJECTION, SOLUTION INTRAVENOUS at 23:50

## 2022-01-17 RX ADMIN — DEXAMETHASONE SODIUM PHOSPHATE 6 MG: 4 INJECTION, SOLUTION INTRA-ARTICULAR; INTRALESIONAL; INTRAMUSCULAR; INTRAVENOUS; SOFT TISSUE at 23:22

## 2022-01-17 RX ADMIN — IOPAMIDOL 100 ML: 755 INJECTION, SOLUTION INTRAVENOUS at 19:29

## 2022-01-17 RX ADMIN — AMPICILLIN SODIUM AND SULBACTAM SODIUM 3 G: 2; 1 INJECTION, POWDER, FOR SOLUTION INTRAMUSCULAR; INTRAVENOUS at 21:06

## 2022-01-17 RX ADMIN — MAGNESIUM SULFATE HEPTAHYDRATE 2 G: 40 INJECTION, SOLUTION INTRAVENOUS at 23:28

## 2022-01-17 RX ADMIN — POTASSIUM CHLORIDE 10 MEQ: 7.46 INJECTION, SOLUTION INTRAVENOUS at 23:28

## 2022-01-17 RX ADMIN — ACETAMINOPHEN 650 MG: 650 SUPPOSITORY RECTAL at 17:57

## 2022-01-17 RX ADMIN — FAMOTIDINE 20 MG: 10 INJECTION, SOLUTION INTRAVENOUS at 23:20

## 2022-01-17 ASSESSMENT — ENCOUNTER SYMPTOMS
FATIGUE: 1
DIARRHEA: 0
CHILLS: 1
FEVER: 1
APPETITE CHANGE: 1
SHORTNESS OF BREATH: 1
VOMITING: 0
CONFUSION: 1
COUGH: 1

## 2022-01-17 ASSESSMENT — ACTIVITIES OF DAILY LIVING (ADL)
ADLS_ACUITY_SCORE: 9
ADLS_ACUITY_SCORE: 15
ADLS_ACUITY_SCORE: 9

## 2022-01-17 NOTE — ED TRIAGE NOTES
Patient arrived via EMS for shortness of breath, took at home test and it was positive, patient was 86% on room air for medics, arrived on 6L NC with SpO2 of 100%.  having some mild confusion as well.

## 2022-01-17 NOTE — ED PROVIDER NOTES
EMERGENCY DEPARTMENT ENCOUNTER      NAME: Amelia Fry  AGE: 81 year old female  YOB: 1940  MRN: [id number]  EVALUATION DATE & TIME: 1/17/2022  4:43 PM    PCP: Roland Henriquez    ED PROVIDER: Leonie Berry PA-C      Chief Complaint   Patient presents with     Shortness of Breath         FINAL IMPRESSION:  1. Aspiration pneumonia (H)    2. Hypoxia    3. Cough    4. Altered mental status          ED COURSE & MEDICAL DECISION MAKING:    Pertinent Labs & Imaging studies reviewed. (See chart for details)    81 year old female presents to the Emergency Department for evaluation of fever, cough, and decreased responsiveness.    Physical exam is remarkable for an ill and frail appearing elderly female.  Heart and lung sounds remarkable for coarse breath sounds.  Abdomen is soft and nontender.  Skin feels warm to the touch.  Patient initially tachycardic and febrile, requiring oxygen on room air.    CBC is unremarkable with no leukocytosis or anemia.  CMP is remarkable for mild hyponatremia with sodium of 129, mild hypokalemia with potassium of 3.1, normal kidney function.  Lactic acid is within normal limits.  Magnesium mildly low at 1.6.  BNP mildly elevated at 170.  Troponin is negative, EKG shows no acute ischemic changes.  D-dimer is elevated at 2.16, CTA of the chest does not reveal any evidence of pulmonary embolism.  There is evidence of bronchitis as well as possible right-sided aspiration pneumonia.  Blood cultures are pending.  Urinalysis without evidence of infection.  COVID test is positive.    Patient's vital signs did improve after Tylenol administration.  She was given Unasyn for treatment of possible aspiration pneumonia here in the emergency department.  No evidence of sepsis at this point but given new oxygen requirement and multiple possible sources of infection, the patient will be admitted to the hospital.  Patient did become more interactive throughout her emergency  department stay.  She is agreeable to admission, son Nir also updated.  Care discussed with Dr. Dowling who is in agreement with the treatment plan.    Son Nir requesting to be updated with any status changes [phone].    ED Course   4:58 PM Performed my initial history and physical exam. Discussed workup in the emergency department, management of symptoms, and likely disposition.   5:06 PM I spoke with the patient's niece, Emilee, to update her on the patient's workup in the emergency department.  5:20 PM I staffed the patient with my colleague, Dr. Dowling, who will evaluate the patient. Dr. Dowling was updated on the patient throughout ED course.   8:30 PM I discussed the case with hospitalist, Dr Deshpande, who accepts the patient    8:41 PM I spoke with the patient's son, Nir, to update him that the patient will be admitted to the hospital.   8:44 PM I rechecked and updated the patient.     At the conclusion of the encounter I discussed the results of all of the tests and the disposition. The questions were answered. The patient or family acknowledged understanding and was agreeable with the care plan.     Voice recognition software was used in the creation of this note. Any grammatical or nonsensical errors are due to inherent errors with the software and are not the intention of the writer.     PPE: Provider wore gloves, N95 mask, and eye protection.      MEDICATIONS GIVEN IN THE EMERGENCY:  Medications   ampicillin-sulbactam (UNASYN) 3 g vial to attach to  mL bag (has no administration in time range)   acetaminophen (TYLENOL) Suppository 650 mg (650 mg Rectal Given 1/17/22 1757)   iopamidol (ISOVUE-370) solution 100 mL (100 mLs Intravenous Given 1/17/22 1929)       NEW PRESCRIPTIONS STARTED AT TODAY'S ER VISIT  New Prescriptions    No medications on file            =================================================================    HPI    Patient information was obtained from: Patient's  sister    Use of Intrepreter: N/A        Amelia Fry is a 81 year old female with a history of multiple myeloma, hypothyroidism, hypertension, legal blindness, hyperlipidemia, and skin cancer (in remission), who presents to the ED via EMS for evaluation of shortness of breath.     The patient's sister reports that this morning, the patient woke up and was complaining of a mild cough and fevers (T-max of 100.3  F).  Throughout the day, the patient became less responsive.  Patient's sister called EMS after she did not answer any questions while resting.    They did do a home COVID test today which was positive.  Patient is fully vaccinated but is currently being treated with chemotherapy by Dr. Ramos from [facility].    REVIEW OF SYSTEMS   Review of Systems   Constitutional: Positive for appetite change, chills, fatigue and fever.   Respiratory: Positive for cough and shortness of breath.    Gastrointestinal: Negative for diarrhea and vomiting.   Psychiatric/Behavioral: Positive for confusion.     All other systems reviewed and are negative unless noted in HPI.        PAST MEDICAL HISTORY:  History reviewed. No pertinent past medical history.    PAST SURGICAL HISTORY:  Past Surgical History:   Procedure Laterality Date     EYE SURGERY         CURRENT MEDICATIONS:    acetaminophen (TYLENOL) 325 MG tablet  aspirin 81 mg chewable tablet  brimonidine (ALPHAGAN P) 0.1 % ophthalmic solution  cholecalciferol 25 MCG (1000 UT) TABS  dorzolamide-timolol (COSOPT) 2-0.5 % ophthalmic solution  gabapentin (NEURONTIN) 300 MG capsule  levothyroxine (SYNTHROID, LEVOTHROID) 100 MCG tablet  loperamide (IMODIUM) 2 MG capsule  MELALEUCA SC  netarsudil (RHOPRESSA) 0.02 % ophthalmic solution  ondansetron (ZOFRAN) 8 MG tablet  oxyCODONE (OXYCONTIN) 30 MG 12 hr tablet  polyethylene glycol (MIRALAX) 17 GM/Dose powder  potassium chloride SA (K-DUR,KLOR-CON) 10 MEQ tablet  senna (SENOKOT) 8.6 MG tablet  tafluprost, PF, (ZIOPTAN,  "PF,) 0.0015 % Dpet ophthalmic dropperette  vitamin E 400 unit capsule        ALLERGIES:  Allergies   Allergen Reactions     Codeine Anaphylaxis     Chest pressure, shortness of breath     External Allergen Needs Reconciliation - See Comment Unknown     UNABLE TO ASSESS, States \"all opiates.\"       FAMILY HISTORY:  History reviewed. No pertinent family history.    SOCIAL HISTORY:   Social History     Socioeconomic History     Marital status:      Spouse name: Not on file     Number of children: Not on file     Years of education: Not on file     Highest education level: Not on file   Occupational History     Not on file   Tobacco Use     Smoking status: Never Smoker     Smokeless tobacco: Never Used   Substance and Sexual Activity     Alcohol use: Yes     Comment: Alcoholic Drinks/day: a few glasses of wine a month     Drug use: No     Sexual activity: Not on file   Other Topics Concern     Not on file   Social History Narrative     Not on file     Social Determinants of Health     Financial Resource Strain: Not on file   Food Insecurity: Not on file   Transportation Needs: Not on file   Physical Activity: Not on file   Stress: Not on file   Social Connections: Not on file   Intimate Partner Violence: Not on file   Housing Stability: Not on file       VITALS:  Patient Vitals for the past 24 hrs:   BP Temp Temp src Pulse Resp SpO2   01/17/22 1900 -- -- -- 94 24 95 %   01/17/22 1845 -- 98.3  F (36.8  C) Oral 98 25 96 %   01/17/22 1815 -- -- -- 98 25 96 %   01/17/22 1730 -- -- -- 117 29 95 %   01/17/22 1644 131/63 99.1  F (37.3  C) -- 110 20 99 %       PHYSICAL EXAM    VITAL SIGNS: /63   Pulse 94   Temp 98.3  F (36.8  C) (Oral)   Resp 24   SpO2 95%   General Appearance: Ill-appearing, frail; alert, cooperative, normal speech and facial symmetry, appears stated age, the patient does not appear in distress  Head:  Normocephalic, without obvious abnormality, atraumatic  Eyes: Conjunctiva/corneas clear, " EOM's intact, no nystagmus, PERRL  ENT:  Lips, mucosa, and tongue normal; teeth and gums normal, no pharyngeal inflammation, no dysphonia or difficulty swallowing, membranes are moist without pallor  Cardio:  Regular rate and rhythm, S1 and S2 normal, no murmur, rub    or gallop, 2+ pulses symmetric in all extremities  Pulm: Coarse breath sounds throughout, on 02 by nasal cannula;  respirations unlabored with no accessory muscle use  Abdomen:  Abdomen is soft, non-distended with no tenderness to palpation, rebound tenderness, or guarding.   Extremities:  Extremities normal, there is no tenderness to palpation , atraumatic, no cyanosis or edema  Neuro: Patient is awake and responsive to voice. No gross motor weaknesses or sensory loss; moves all extremities.     LAB:  All pertinent labs reviewed and interpreted.  Labs Ordered and Resulted from Time of ED Arrival to Time of ED Departure   INFLUENZA A/B & SARS-COV2 PCR MULTIPLEX - Abnormal       Result Value    Influenza A PCR Negative      Influenza B PCR Negative      SARS CoV2 PCR Positive (*)    ROUTINE UA WITH MICROSCOPIC REFLEX TO CULTURE - Abnormal    Color Urine Light Yellow      Appearance Urine Turbid (*)     Glucose Urine Negative      Bilirubin Urine Negative      Ketones Urine Negative      Specific Gravity Urine 1.014      Blood Urine 0.2 mg/dL (*)     pH Urine 7.0      Protein Albumin Urine 30  (*)     Urobilinogen Urine <2.0      Nitrite Urine Negative      Leukocyte Esterase Urine Negative      Bacteria Urine Few (*)     Mucus Urine Present (*)     Amorphous Crystals Urine Few (*)     RBC Urine 45 (*)     WBC Urine 7 (*)     Squamous Epithelials Urine 1     B-TYPE NATRIURETIC PEPTIDE ( [facility] ONLY) - Abnormal     (*)    COMPREHENSIVE METABOLIC PANEL - Abnormal    Sodium 129 (*)     Potassium 3.1 (*)     Chloride 96 (*)     Carbon Dioxide (CO2) 26      Anion Gap 7      Urea Nitrogen 12      Creatinine 0.70      Calcium 8.5      Glucose 136 (*)      Alkaline Phosphatase 89      AST 25      ALT 31      Protein Total 8.8 (*)     Albumin 3.1 (*)     Bilirubin Total 0.5      GFR Estimate 87     MAGNESIUM - Abnormal    Magnesium 1.6 (*)    D DIMER QUANTITATIVE - Abnormal    D-Dimer Quantitative 2.16 (*)    CBC WITH PLATELETS AND DIFFERENTIAL - Abnormal    WBC Count 4.1      RBC Count 3.89      Hemoglobin 12.8      Hematocrit 38.5      MCV 99      MCH 32.9      MCHC 33.2      RDW 14.5      Platelet Count 170      % Neutrophils 77      % Lymphocytes 15      % Monocytes 8      % Eosinophils 0      % Basophils 0      % Immature Granulocytes 0      NRBCs per 100 WBC 0      Absolute Neutrophils 3.2      Absolute Lymphocytes 0.6 (*)     Absolute Monocytes 0.3      Absolute Eosinophils 0.0      Absolute Basophils 0.0      Absolute Immature Granulocytes 0.0      Absolute NRBCs 0.0     LACTIC ACID WHOLE BLOOD - Normal    Lactic Acid 0.9     TROPONIN I - Normal    Troponin I 0.02     BLOOD CULTURE   BLOOD CULTURE       RADIOLOGY:  Reviewed all pertinent imaging. Please see official radiology report.  CT Chest Pulmonary Embolism w Contrast   Final Result   IMPRESSION:      1.  Motion artifact.      2.  No definitive pulmonary embolism.      3.  Relatively extensive right lower lobe endobronchial contents raising concern for aspiration. A few infectious/ inflammatory appearing nodular pulmonary opacities are also identified.      4.  Moderate airway thickening suggesting bronchitis. Mild bronchiectasis. Interval circumferential nonspecific tracheal wall thickening. Consider pulmonary consultation.      5.  Suspicious osseous findings are present, to include diffuse mottled appearance of the vertebral bodies (which is exaggerated by severe bony demineralization), however, other lucent rib lesions are identified, as well as an expansile anterior right    fourth rib lesion. Findings suggest neoplasm or metastatic disease (to include myelomatous disease).      6.  Multilevel  vertebral compression fractures, some new since older imaging (though ultimately age-indeterminate).      7.  Please see report for complete detail.                EKG:    Performed at: 16:54    I have independently reviewed and interpreted the EKG, along with the final read. EKG also reviewed by Dr. Dowling.    Ventricular rate 110 bpm  NM interval 160 ms  QRS duration 128 ms  QT/QTc 356/481 ms  P-R-T axes 56 108 -39    Impression: Sinus tachycardia; RBBB      I, Deena Schaller, am serving as a scribe to document services personally performed by Leonie Berry PA-C based on my observation and the provider's statements to me. I, Leonie Berry PA-C attest that Deena Feliberto is acting in a scribe capacity, has observed my performance of the services and has documented them in accordance with my direction.     Leonie Berry PA-C  Emergency Medicine  [facility]  [facility] [facility] [id number] [phone]  Dept: [phone]      Leonie Berry PA-C  01/17/22 7852

## 2022-01-17 NOTE — ED PROVIDER NOTES
Emergency Department Midlevel Supervisory Note     I personally saw the patient and performed a substantive portion of the visit including all aspects of the medical decision making.    ED Course:  5:25 PM Leonie Berry PA-C staffed patient with me. I agree with their assessment and plan of management, and I will see the patient.  5:28 PM I met with the patient to introduce myself, gather additional history, perform my initial exam, and discuss the plan. PPE: N95, eye protection, gloves.     Brief HPI:       Amelia Fry is a 81 year old female with a history of multiple myeloma, hypothyroidism, hypertension, legal blindness, hyperlipidemia, and skin cancer (in remission), who presents to the ED via EMS for evaluation of shortness of breath.      The patient's sister reports that this morning, patient woke up with complaints of mild cough and fevers (T-max of 100.3  F).  Throughout the day, the patient became less responsive.  Patient's sister called EMS after she did not answer any questions while resting.     Home COVID test today was positive. Patient is fully vaccinated but is currently being treated with chemotherapy by Dr. Dolan from [place] [facility].    I, Monica Villa, am serving as a scribe to document services personally performed by Dr. Nitesh MD, based on my observations and the provider's statements to me.   I, Dr. Nitesh MD, attest that Monica Villa was acting in a scribe capacity, has observed my performance of the services and has documented them in accordance with my direction.    Brief Physical Exam:   Constitutional:  Awake, interactive, mild confusion  EYES: Conjunctivae clear  HENT:  Atraumatic, normocephalic  Respiratory:  Increased respiratory rate, no wheezing, did note crackles throughout  Cardiovascular:   Mild tachycardia with regular rhythm, good peripheral perfusion  GI: Soft, nondistended, nontender, no palpable masses, no rebound, no guarding   Musculoskeletal:  No edema. No  cyanosis. Range of motion major extremities intact.    Integument: Warm, Dry, No erythema, No rash.   Neurologic:   Mild confusion  Psych: Normal mood and affect     MDM:  Briefly, patient is a 81-year-old female who presents with altered mental status.  She states that she is also noted some cough and generalized weakness.  Concern for COVID, pneumonia based on tachycardia and coarse breath sounds.  She denies any specific substernal chest pain, pleurisy, no ripping or tearing chest discomfort the back or shoulders, no hemoptysis.  Nothing to suggest PE, dissection, her story is atypical for ACS.  Considered also possible UTI.  No focal neurodeficit to suggest CVA.  We will obtain screening labs, urine studies, chest x-ray and COVID screen.  Suspect the patient will require admission.       1. Aspiration pneumonia (H)    2. Hypoxia    3. Cough    4. Altered mental status        Labs and Imaging:  Results for orders placed or performed during the hospital encounter of 01/17/22   CT Chest Pulmonary Embolism w Contrast    Impression    IMPRESSION:    1.  Motion artifact.    2.  No definitive pulmonary embolism.    3.  Relatively extensive right lower lobe endobronchial contents raising concern for aspiration. A few infectious/ inflammatory appearing nodular pulmonary opacities are also identified.    4.  Moderate airway thickening suggesting bronchitis. Mild bronchiectasis. Interval circumferential nonspecific tracheal wall thickening. Consider pulmonary consultation.    5.  Suspicious osseous findings are present, to include diffuse mottled appearance of the vertebral bodies (which is exaggerated by severe bony demineralization), however, other lucent rib lesions are identified, as well as an expansile anterior right   fourth rib lesion. Findings suggest neoplasm or metastatic disease (to include myelomatous disease).    6.  Multilevel vertebral compression fractures, some new since older imaging (though ultimately  age-indeterminate).    7.  Please see report for complete detail.       Extra Blue Top Tube   Result Value Ref Range    Hold Specimen JIC    Extra Red Top Tube   Result Value Ref Range    Hold Specimen JIC    Extra Green Top (Lithium Heparin) Tube   Result Value Ref Range    Hold Specimen JIC    Extra Purple Top Tube   Result Value Ref Range    Hold Specimen JIC    Extra Green Top (Lithium Heparin) ON ICE   Result Value Ref Range    Hold Specimen JIC    Symptomatic; Unknown Influenza A/B & SARS-CoV2 (COVID-19) Virus PCR Multiplex Nasopharyngeal    Specimen: Nasopharyngeal; Swab   Result Value Ref Range    Influenza A PCR Negative Negative    Influenza B PCR Negative Negative    SARS CoV2 PCR Positive (A) Negative   UA with Microscopic reflex to Culture    Specimen: Urine, Catheter   Result Value Ref Range    Color Urine Light Yellow Colorless, Straw, Light Yellow, Yellow    Appearance Urine Turbid (A) Clear    Glucose Urine Negative Negative mg/dL    Bilirubin Urine Negative Negative    Ketones Urine Negative Negative mg/dL    Specific Gravity Urine 1.014 1.001 - 1.030    Blood Urine 0.2 mg/dL (A) Negative    pH Urine 7.0 5.0 - 7.0    Protein Albumin Urine 30  (A) Negative mg/dL    Urobilinogen Urine <2.0 <2.0 mg/dL    Nitrite Urine Negative Negative    Leukocyte Esterase Urine Negative Negative    Bacteria Urine Few (A) None Seen /HPF    Mucus Urine Present (A) None Seen /LPF    Amorphous Crystals Urine Few (A) None Seen /HPF    RBC Urine 45 (H) <=2 /HPF    WBC Urine 7 (H) <=5 /HPF    Squamous Epithelials Urine 1 <=1 /HPF   B-Type Natriuretic Peptide ([facility] Only)   Result Value Ref Range     (H) 0 - 155 pg/mL   Comprehensive metabolic panel   Result Value Ref Range    Sodium 129 (L) 136 - 145 mmol/L    Potassium 3.1 (L) 3.5 - 5.0 mmol/L    Chloride 96 (L) 98 - 107 mmol/L    Carbon Dioxide (CO2) 26 22 - 31 mmol/L    Anion Gap 7 5 - 18 mmol/L    Urea Nitrogen 12 8 - 28 mg/dL    Creatinine 0.70 0.60 - 1.10  mg/dL    Calcium 8.5 8.5 - 10.5 mg/dL    Glucose 136 (H) 70 - 125 mg/dL    Alkaline Phosphatase 89 45 - 120 U/L    AST 25 0 - 40 U/L    ALT 31 0 - 45 U/L    Protein Total 8.8 (H) 6.0 - 8.0 g/dL    Albumin 3.1 (L) 3.5 - 5.0 g/dL    Bilirubin Total 0.5 0.0 - 1.0 mg/dL    GFR Estimate 87 >60 mL/min/1.73m2   Lactic acid whole blood   Result Value Ref Range    Lactic Acid 0.9 0.7 - 2.0 mmol/L   Result Value Ref Range    Magnesium 1.6 (L) 1.8 - 2.6 mg/dL   Result Value Ref Range    Troponin I 0.02 0.00 - 0.29 ng/mL   D dimer quantitative   Result Value Ref Range    D-Dimer Quantitative 2.16 (H) 0.00 - 0.50 ug/mL FEU   CBC with platelets and differential   Result Value Ref Range    WBC Count 4.1 4.0 - 11.0 10e3/uL    RBC Count 3.89 3.80 - 5.20 10e6/uL    Hemoglobin 12.8 11.7 - 15.7 g/dL    Hematocrit 38.5 35.0 - 47.0 %    MCV 99 78 - 100 fL    MCH 32.9 26.5 - 33.0 pg    MCHC 33.2 31.5 - 36.5 g/dL    RDW 14.5 10.0 - 15.0 %    Platelet Count 170 150 - 450 10e3/uL    % Neutrophils 77 %    % Lymphocytes 15 %    % Monocytes 8 %    % Eosinophils 0 %    % Basophils 0 %    % Immature Granulocytes 0 %    NRBCs per 100 WBC 0 <1 /100    Absolute Neutrophils 3.2 1.6 - 8.3 10e3/uL    Absolute Lymphocytes 0.6 (L) 0.8 - 5.3 10e3/uL    Absolute Monocytes 0.3 0.0 - 1.3 10e3/uL    Absolute Eosinophils 0.0 0.0 - 0.7 10e3/uL    Absolute Basophils 0.0 0.0 - 0.2 10e3/uL    Absolute Immature Granulocytes 0.0 <=0.4 10e3/uL    Absolute NRBCs 0.0 10e3/uL     I have reviewed the relevant laboratory and radiology studies    Procedures:  I was present for the key portions of this procedure: none      [facility] EMERGENCY DEPARTMENT  [facility] [facility] [id number]  [phone]       Boni Dowling MD  01/17/22 0344

## 2022-01-17 NOTE — ED NOTES
Bed: JNED-14  Expected date: 1/17/22  Expected time:   Means of arrival: Ambulance  Comments:  [facility]  81 F  COVID

## 2022-01-18 ENCOUNTER — APPOINTMENT (OUTPATIENT)
Dept: RADIOLOGY | Facility: HOSPITAL | Age: 82
DRG: 177 | End: 2022-01-18
Attending: STUDENT IN AN ORGANIZED HEALTH CARE EDUCATION/TRAINING PROGRAM
Payer: COMMERCIAL

## 2022-01-18 ENCOUNTER — APPOINTMENT (OUTPATIENT)
Dept: SPEECH THERAPY | Facility: HOSPITAL | Age: 82
DRG: 177 | End: 2022-01-18
Attending: HOSPITALIST
Payer: COMMERCIAL

## 2022-01-18 ENCOUNTER — APPOINTMENT (OUTPATIENT)
Dept: SPEECH THERAPY | Facility: HOSPITAL | Age: 82
DRG: 177 | End: 2022-01-18
Payer: COMMERCIAL

## 2022-01-18 LAB
ABO/RH(D): NORMAL
ANION GAP SERPL CALCULATED.3IONS-SCNC: 10 MMOL/L (ref 5–18)
AT III ACT/NOR PPP CHRO: 76 % (ref 85–135)
ATRIAL RATE - MUSE: 110 BPM
BACTERIA SPT CULT: NORMAL
BUN SERPL-MCNC: 11 MG/DL (ref 8–28)
C REACTIVE PROTEIN LHE: 16.7 MG/DL (ref 0–0.8)
CALCIUM SERPL-MCNC: 8.1 MG/DL (ref 8.5–10.5)
CHLORIDE BLD-SCNC: 102 MMOL/L (ref 98–107)
CK SERPL-CCNC: 26 U/L (ref 30–190)
CO2 SERPL-SCNC: 20 MMOL/L (ref 22–31)
CREAT SERPL-MCNC: 0.61 MG/DL (ref 0.6–1.1)
D DIMER PPP FEU-MCNC: 1.88 UG/ML FEU (ref 0–0.5)
DIASTOLIC BLOOD PRESSURE - MUSE: 63 MMHG
ERYTHROCYTE [DISTWIDTH] IN BLOOD BY AUTOMATED COUNT: 14.6 % (ref 10–15)
FERRITIN SERPL-MCNC: 152 NG/ML (ref 10–130)
GFR SERPL CREATININE-BSD FRML MDRD: 90 ML/MIN/1.73M2
GLUCOSE BLD-MCNC: 130 MG/DL (ref 70–125)
GRAM STAIN RESULT: NORMAL
HCT VFR BLD AUTO: 37.5 % (ref 35–47)
HGB BLD-MCNC: 12.4 G/DL (ref 11.7–15.7)
INTERPRETATION ECG - MUSE: NORMAL
LDH SERPL L TO P-CCNC: 312 U/L (ref 125–220)
MAGNESIUM SERPL-MCNC: 2.2 MG/DL (ref 1.8–2.6)
MCH RBC QN AUTO: 33 PG (ref 26.5–33)
MCHC RBC AUTO-ENTMCNC: 33.1 G/DL (ref 31.5–36.5)
MCV RBC AUTO: 100 FL (ref 78–100)
OSMOLALITY UR: 504 MOSM/KG (ref 300–900)
P AXIS - MUSE: 56 DEGREES
PHOSPHATE SERPL-MCNC: 2.9 MG/DL (ref 2.5–4.5)
PLATELET # BLD AUTO: 170 10E3/UL (ref 150–450)
POTASSIUM BLD-SCNC: 3.2 MMOL/L (ref 3.5–5)
POTASSIUM BLD-SCNC: 3.6 MMOL/L (ref 3.5–5)
PR INTERVAL - MUSE: 160 MS
PROCALCITONIN SERPL-MCNC: 1.52 NG/ML (ref 0–0.49)
QRS DURATION - MUSE: 128 MS
QT - MUSE: 356 MS
QTC - MUSE: 481 MS
R AXIS - MUSE: 108 DEGREES
RBC # BLD AUTO: 3.76 10E6/UL (ref 3.8–5.2)
RETICS # AUTO: 0.03 10E6/UL (ref 0.01–0.11)
RETICS/RBC NFR AUTO: 0.9 % (ref 0.8–2.7)
SODIUM SERPL-SCNC: 132 MMOL/L (ref 136–145)
SPECIMEN EXPIRATION DATE: NORMAL
SYSTOLIC BLOOD PRESSURE - MUSE: 131 MMHG
T AXIS - MUSE: -39 DEGREES
VENTRICULAR RATE- MUSE: 110 BPM
WBC # BLD AUTO: 5 10E3/UL (ref 4–11)

## 2022-01-18 PROCEDURE — 36415 COLL VENOUS BLD VENIPUNCTURE: CPT | Performed by: HOSPITALIST

## 2022-01-18 PROCEDURE — 85049 AUTOMATED PLATELET COUNT: CPT | Performed by: HOSPITALIST

## 2022-01-18 PROCEDURE — 74220 X-RAY XM ESOPHAGUS 1CNTRST: CPT

## 2022-01-18 PROCEDURE — 74230 X-RAY XM SWLNG FUNCJ C+: CPT

## 2022-01-18 PROCEDURE — 86901 BLOOD TYPING SEROLOGIC RH(D): CPT | Performed by: HOSPITALIST

## 2022-01-18 PROCEDURE — 250N000009 HC RX 250: Performed by: HOSPITALIST

## 2022-01-18 PROCEDURE — 250N000013 HC RX MED GY IP 250 OP 250 PS 637: Performed by: INTERNAL MEDICINE

## 2022-01-18 PROCEDURE — 99222 1ST HOSP IP/OBS MODERATE 55: CPT | Performed by: NURSE PRACTITIONER

## 2022-01-18 PROCEDURE — 92611 MOTION FLUOROSCOPY/SWALLOW: CPT | Mod: GN

## 2022-01-18 PROCEDURE — 87070 CULTURE OTHR SPECIMN AEROBIC: CPT | Performed by: HOSPITALIST

## 2022-01-18 PROCEDURE — 82550 ASSAY OF CK (CPK): CPT | Performed by: HOSPITALIST

## 2022-01-18 PROCEDURE — 84132 ASSAY OF SERUM POTASSIUM: CPT | Performed by: INTERNAL MEDICINE

## 2022-01-18 PROCEDURE — 250N000013 HC RX MED GY IP 250 OP 250 PS 637: Performed by: NURSE PRACTITIONER

## 2022-01-18 PROCEDURE — 250N000011 HC RX IP 250 OP 636: Performed by: INTERNAL MEDICINE

## 2022-01-18 PROCEDURE — 92610 EVALUATE SWALLOWING FUNCTION: CPT | Mod: GN

## 2022-01-18 PROCEDURE — 85379 FIBRIN DEGRADATION QUANT: CPT | Performed by: HOSPITALIST

## 2022-01-18 PROCEDURE — 83735 ASSAY OF MAGNESIUM: CPT | Performed by: HOSPITALIST

## 2022-01-18 PROCEDURE — 84100 ASSAY OF PHOSPHORUS: CPT | Performed by: HOSPITALIST

## 2022-01-18 PROCEDURE — 85045 AUTOMATED RETICULOCYTE COUNT: CPT | Performed by: HOSPITALIST

## 2022-01-18 PROCEDURE — 250N000011 HC RX IP 250 OP 636: Performed by: HOSPITALIST

## 2022-01-18 PROCEDURE — 99233 SBSQ HOSP IP/OBS HIGH 50: CPT | Performed by: INTERNAL MEDICINE

## 2022-01-18 PROCEDURE — 36415 COLL VENOUS BLD VENIPUNCTURE: CPT | Performed by: INTERNAL MEDICINE

## 2022-01-18 PROCEDURE — 80048 BASIC METABOLIC PNL TOTAL CA: CPT | Performed by: HOSPITALIST

## 2022-01-18 PROCEDURE — 92526 ORAL FUNCTION THERAPY: CPT | Mod: GN

## 2022-01-18 PROCEDURE — 258N000003 HC RX IP 258 OP 636: Performed by: HOSPITALIST

## 2022-01-18 PROCEDURE — 83615 LACTATE (LD) (LDH) ENZYME: CPT | Performed by: HOSPITALIST

## 2022-01-18 PROCEDURE — 83529 ASAY OF INTERLEUKIN-6 (IL-6): CPT | Performed by: HOSPITALIST

## 2022-01-18 PROCEDURE — 120N000001 HC R&B MED SURG/OB

## 2022-01-18 PROCEDURE — 84145 PROCALCITONIN (PCT): CPT | Performed by: HOSPITALIST

## 2022-01-18 PROCEDURE — 86140 C-REACTIVE PROTEIN: CPT | Performed by: HOSPITALIST

## 2022-01-18 PROCEDURE — 250N000013 HC RX MED GY IP 250 OP 250 PS 637: Performed by: HOSPITALIST

## 2022-01-18 RX ORDER — LEVOTHYROXINE SODIUM 100 UG/1
100 TABLET ORAL DAILY
Status: DISCONTINUED | OUTPATIENT
Start: 2022-01-18 | End: 2022-01-20 | Stop reason: HOSPADM

## 2022-01-18 RX ORDER — BARIUM SULFATE 400 MG/ML
SUSPENSION ORAL ONCE
Status: COMPLETED | OUTPATIENT
Start: 2022-01-18 | End: 2022-01-18

## 2022-01-18 RX ORDER — ASPIRIN 81 MG/1
81 TABLET, CHEWABLE ORAL DAILY
Status: DISCONTINUED | OUTPATIENT
Start: 2022-01-18 | End: 2022-01-20 | Stop reason: HOSPADM

## 2022-01-18 RX ORDER — MORPHINE SULFATE 20 MG/ML
10 SOLUTION ORAL EVERY 6 HOURS
Status: DISCONTINUED | OUTPATIENT
Start: 2022-01-18 | End: 2022-01-19

## 2022-01-18 RX ORDER — LATANOPROST 50 UG/ML
1 SOLUTION/ DROPS OPHTHALMIC AT BEDTIME
Status: DISCONTINUED | OUTPATIENT
Start: 2022-01-18 | End: 2022-01-20 | Stop reason: HOSPADM

## 2022-01-18 RX ORDER — POTASSIUM CHLORIDE 1.5 G/1.58G
20 POWDER, FOR SOLUTION ORAL ONCE
Status: COMPLETED | OUTPATIENT
Start: 2022-01-18 | End: 2022-01-18

## 2022-01-18 RX ORDER — BARIUM SULFATE 400 MG/ML
SUSPENSION ORAL ONCE
Status: DISCONTINUED | OUTPATIENT
Start: 2022-01-18 | End: 2022-01-18

## 2022-01-18 RX ORDER — ACETAMINOPHEN 325 MG/1
650 TABLET ORAL EVERY 8 HOURS PRN
Status: DISCONTINUED | OUTPATIENT
Start: 2022-01-18 | End: 2022-01-20 | Stop reason: HOSPADM

## 2022-01-18 RX ORDER — POTASSIUM CHLORIDE 7.45 MG/ML
10 INJECTION INTRAVENOUS
Status: COMPLETED | OUTPATIENT
Start: 2022-01-18 | End: 2022-01-18

## 2022-01-18 RX ADMIN — POTASSIUM CHLORIDE FOR ORAL SOLUTION 20 MEQ: 1.5 POWDER, FOR SOLUTION ORAL at 11:37

## 2022-01-18 RX ADMIN — FAMOTIDINE 20 MG: 10 INJECTION, SOLUTION INTRAVENOUS at 11:41

## 2022-01-18 RX ADMIN — ONDANSETRON 4 MG: 2 INJECTION INTRAMUSCULAR; INTRAVENOUS at 03:31

## 2022-01-18 RX ADMIN — MORPHINE SULFATE 10 MG: 20 SOLUTION ORAL at 14:52

## 2022-01-18 RX ADMIN — SODIUM CHLORIDE 50 ML: 9 INJECTION, SOLUTION INTRAVENOUS at 17:50

## 2022-01-18 RX ADMIN — REMDESIVIR 100 MG: 100 INJECTION, POWDER, LYOPHILIZED, FOR SOLUTION INTRAVENOUS at 17:36

## 2022-01-18 RX ADMIN — AMPICILLIN SODIUM AND SULBACTAM SODIUM 3 G: 2; 1 INJECTION, POWDER, FOR SOLUTION INTRAMUSCULAR; INTRAVENOUS at 10:07

## 2022-01-18 RX ADMIN — LEVOTHYROXINE SODIUM 100 MCG: 0.1 TABLET ORAL at 17:49

## 2022-01-18 RX ADMIN — ASPIRIN 81 MG CHEWABLE TABLET 81 MG: 81 TABLET CHEWABLE at 17:49

## 2022-01-18 RX ADMIN — BARIUM SULFATE: 400 SUSPENSION ORAL at 11:33

## 2022-01-18 RX ADMIN — BRIMONIDINE TARTRATE 1 DROP: 1 SOLUTION/ DROPS OPHTHALMIC at 22:20

## 2022-01-18 RX ADMIN — POTASSIUM CHLORIDE 10 MEQ: 10 INJECTION, SOLUTION INTRAVENOUS at 13:05

## 2022-01-18 RX ADMIN — AMPICILLIN SODIUM AND SULBACTAM SODIUM 3 G: 2; 1 INJECTION, POWDER, FOR SOLUTION INTRAMUSCULAR; INTRAVENOUS at 22:19

## 2022-01-18 RX ADMIN — BRIMONIDINE TARTRATE 1 DROP: 1 SOLUTION/ DROPS OPHTHALMIC at 08:03

## 2022-01-18 RX ADMIN — POTASSIUM CHLORIDE 10 MEQ: 10 INJECTION, SOLUTION INTRAVENOUS at 17:49

## 2022-01-18 RX ADMIN — MORPHINE SULFATE 2 MG: 2 INJECTION, SOLUTION INTRAMUSCULAR; INTRAVENOUS at 03:33

## 2022-01-18 RX ADMIN — LATANOPROST 1 DROP: 50 SOLUTION OPHTHALMIC at 22:19

## 2022-01-18 RX ADMIN — ENOXAPARIN SODIUM 25 MG: 300 INJECTION SUBCUTANEOUS at 07:56

## 2022-01-18 RX ADMIN — SODIUM CHLORIDE 50 ML: 9 INJECTION, SOLUTION INTRAVENOUS at 00:22

## 2022-01-18 RX ADMIN — DEXAMETHASONE SODIUM PHOSPHATE 6 MG: 4 INJECTION, SOLUTION INTRA-ARTICULAR; INTRALESIONAL; INTRAMUSCULAR; INTRAVENOUS; SOFT TISSUE at 13:01

## 2022-01-18 RX ADMIN — FAMOTIDINE 20 MG: 10 INJECTION, SOLUTION INTRAVENOUS at 22:22

## 2022-01-18 RX ADMIN — AMPICILLIN SODIUM AND SULBACTAM SODIUM 3 G: 2; 1 INJECTION, POWDER, FOR SOLUTION INTRAMUSCULAR; INTRAVENOUS at 17:37

## 2022-01-18 RX ADMIN — DORZOLAMIDE HYDROCHLORIDE AND TIMOLOL MALEATE 1 DROP: 20; 5 SOLUTION/ DROPS OPHTHALMIC at 22:21

## 2022-01-18 RX ADMIN — AMPICILLIN SODIUM AND SULBACTAM SODIUM 3 G: 2; 1 INJECTION, POWDER, FOR SOLUTION INTRAMUSCULAR; INTRAVENOUS at 03:14

## 2022-01-18 RX ADMIN — DORZOLAMIDE HYDROCHLORIDE AND TIMOLOL MALEATE 1 DROP: 20; 5 SOLUTION/ DROPS OPHTHALMIC at 08:20

## 2022-01-18 ASSESSMENT — ACTIVITIES OF DAILY LIVING (ADL)
ADLS_ACUITY_SCORE: 15
ADLS_ACUITY_SCORE: 15
WHICH_OF_THE_ABOVE_FUNCTIONAL_RISKS_HAD_A_RECENT_ONSET_OR_CHANGE?: AMBULATION;TRANSFERRING;TOILETING;BATHING;DRESSING
ADLS_ACUITY_SCORE: 15
TOILETING_ASSISTANCE: TOILETING DIFFICULTY, ASSISTANCE 1 PERSON
ADLS_ACUITY_SCORE: 17
ADLS_ACUITY_SCORE: 15
EATING/SWALLOWING: EATING;SWALLOWING SOLID FOOD
ADLS_ACUITY_SCORE: 17
DRESSING/BATHING: BATHING DIFFICULTY, ASSISTANCE 1 PERSON
TOILETING_ISSUES: YES
FALL_HISTORY_WITHIN_LAST_SIX_MONTHS: NO
ADLS_ACUITY_SCORE: 15
WALKING_OR_CLIMBING_STAIRS: AMBULATION DIFFICULTY, ASSISTANCE 1 PERSON
WEAR_GLASSES_OR_BLIND: YES
ADLS_ACUITY_SCORE: 15
ADLS_ACUITY_SCORE: 17
HEARING_DIFFICULTY_OR_DEAF: NO
DRESSING/BATHING_DIFFICULTY: YES
ADLS_ACUITY_SCORE: 15
ADLS_ACUITY_SCORE: 19
ADLS_ACUITY_SCORE: 17
ADLS_ACUITY_SCORE: 15
DOING_ERRANDS_INDEPENDENTLY_DIFFICULTY: YES
ADLS_ACUITY_SCORE: 17
ADLS_ACUITY_SCORE: 15
CONCENTRATING,_REMEMBERING_OR_MAKING_DECISIONS_DIFFICULTY: NO
ADLS_ACUITY_SCORE: 15
WALKING_OR_CLIMBING_STAIRS_DIFFICULTY: YES
VISION_MANAGEMENT: LEGALLY BLIND
DIFFICULTY_EATING/SWALLOWING: YES
DIFFICULTY_COMMUNICATING: NO

## 2022-01-18 ASSESSMENT — MIFFLIN-ST. JEOR
SCORE: 982.54
SCORE: 982.88

## 2022-01-18 NOTE — ED NOTES
[facility] ED Handoff Report    ED Chief Complaint: sob    ED Diagnosis:  (J69.0) Aspiration pneumonia (H)  Comment: hypoxic  Plan: admission    (R09.02) Hypoxia  Comment: currently RA sat 93%  Plan:     (R05.9) Cough  Comment:   Plan:     (R41.82) Altered mental status  Comment:   Plan:        PMH:  History reviewed. No pertinent past medical history.     Code Status:  No CPR- Do NOT Intubate     Falls Risk: Yes Band: Applied    Current Living Situation/Residence: lives in a house     Elimination Status: Continent: No     Activity Level: SBA    Patients Preferred Language:  English     Needed: No    Vital Signs:  /81   Pulse 92   Temp 98.3  F (36.8  C) (Oral)   Resp 16   SpO2 92%      Cardiac Rhythm: NSR    Pain Score: 2/10    Is the Patient Confused:  No    Last Food or Drink: 1/17/22 at 08    Focused Assessment:  81 F arrived to ED via medics from home where she lives with her sister. Pt is legally blind. She c/o sob had a positive covid test last week. She has hx multiple myeloma last chemo tx was held on Thursday. Upon arrival to ED her O2 sat was in the 80's and she was confused. Was oriented x 4 at 1200 today. She has been having trouble with liquids, swallow study and esophogram was done today. I was told she could have thin liquids but when I tried to replace her Kt she coughed up some thick sputum, so she received IV kt.  She has been up to BSC with assist and has had 2 lg. Soft stools. Currently sleeping, awakens to voice after PO morphine dose. O2 sat 92% on RA.    Tests Performed: Done: Labs and Imaging    Treatments Provided:  meds    Family Dynamics/Concerns: No    Family Updated On Visitor Policy: Yes    Plan of Care Communicated to Family: Yes    Who Was Updated about Plan of Care: son Nir [phone]    Belongings Checklist Done and Signed by Patient: Yes    Medications sent with patient: yes    Covid: symptomatic, positive    Additional Information: she has her own meds  with her here.    RN: Crystal Downs   1/18/2022 3:45 PM

## 2022-01-18 NOTE — ED NOTES
Patient removed oxygen sometime last night- oxygen sats maintaining at 95% or greater.  Did not replace oxygen.

## 2022-01-18 NOTE — ED NOTES
I assisted along with the nurse to get pt. up and onto the commode to have a BM. We changed her sheet and blankets and washed her up and assisted pt. Back into the bed with a clean depends and will replace the purewick when she comes back from xray.

## 2022-01-18 NOTE — ED NOTES
While giving her liquid potassium she coughed and gagged and spit up a small amt of thick phlegm. Will change Kt to crushed pills in applesauce. Her son is updated re plan of care.

## 2022-01-18 NOTE — PROGRESS NOTES
Speech-Language Pathology: Video Swallow Study     01/18/22 1100   General Information   Onset of Illness/Injury or Date of Surgery 01/17/22   Pertinent History of Current Problem Concern for aspiration and esophageal dysphagia   General Observations Alert and cooperative   Type of Evaluation   Type of Evaluation Swallow Evaluation   Oral Motor   Oral Musculature generally intact  (general weakness c/w age and illness but WFL)   General Swallowing Observations   Swallowing Evaluation Videofluoroscopic swallow study (VFSS)   Clinical Swallow Evaluation   Feeding Assistance minimal assistance required   VFSS Evaluation   Radiologist Dr. Engel   Views Taken left lateral   VFSS Textures Trialed thin liquids;mildly thick liquids;pureed;solid foods   VFSS Eval: Thin Liquid Texture Trial   Mode of Presentation, Thin Liquid cup;self-fed;straw   Preparatory Phase WFL   Oral Phase, Thin Liquid WFL   Pharyngeal Phase, Thin Liquid WFL;Residue in valleculae;Residue in pyriform sinus  (mild residue)   Rosenbek's Penetration Aspiration Scale: Thin Liquid Trial Results 2 - contrast enters airway, remains above the vocal cords, no residue remains (penetration)   Diagnostic Statement No aspiration. Trace, transient penetration with low aspiration risk.   VFSS Eval: Mildly Thick Liquids   Mode of Presentation cup   Preparatory Phase WFL   Oral Phase WFL   Pharyngeal Phase WFL;Residue in valleculae;Residue in pyriform sinus   Rosenbek's Penetration Aspiration Scale 1 - no aspiration, contrast does not enter airway   Diagnostic Statement No aspiration or penetration   VFSS Evaluation: Puree Solid Texture Trial   Mode of Presentation, Puree spoon   Preparatory Phase WFL   Oral Phase, Puree WFL   Pharyngeal Phase, Puree WFL;Residue in valleculae;Residue in pyriform sinus;Pharyngeal wall coating;UES dysfunction  (mild; mild stasis in upper esophagus noted)   Rosenbek's Penetration Aspiration Scale: Puree Food Trial Results 1 - no  aspiration, contrast does not enter airway   Diagnostic Statement No aspiration or penetration. Mild, diffuse stasis including pharyngeal wall, cricopharyngeus and upper esophageal   VFSS Evaluation: Solid Food Texture Trial   Mode of Presentation, Solid self-fed   Preparatory Phase WFL   Oral Phase, Solid WFL   Pharyngeal Phase, Solid WFL;Residue in pyriform sinus;Pharyngeal wall coating;Residue in valleculae;UES dysfunction   Diagnostic Statement No aspiration or penetration. Mild, diffuse stasis including pharyngeal wall, cricopharyngeus and upper esophageal   Esophageal Phase of Swallow   Patient reports or presents with symptoms of esophageal dysphagia Yes   Esophageal comments Mild presbyesophagus; Sliding hiatal hernia; limited pill passage; see Dedicated Radiologist report   Swallowing Recommendations   Diet Consistency Recommendations thin liquids (level 0);easy to chew (level 7)   Supervision Level for Intake distant supervision needed   Mode of Delivery Recommendations bolus size, small;slow rate of intake   Swallowing Maneuver Recommendations alternate food and liquid intake   Monitoring/Assistance Required (Eating/Swallowing) monitor for cough or change in vocal quality with intake   Recommended Feeding/Eating Techniques (Swallow Eval) maintain upright posture during/after eating for 30 minutes;maintain upright sitting position for eating;set-up and prepare tray;minimize distractions during oral intake   Medication Administration Recommendations, Swallowing (SLP) meds crushed in tyree   Comment, Swallowing Recommendations Videofluoroscopic Swallow Study completed. Patient had no aspiration with any consistency and trace, transient penetration with thin liquids only. Swallow response was mildly delayed but typical for her age group and epiglottic inversion was complete. Stasis occurred with all consistencies but overall trace to mild and cleared with 2nd swallows. Hyolaryngeal elevation was complete and  hyolaryngeal excursion was complete. Recommend diet of Easy to Chew textures and Thin liquids with small bites, alternating solids and liquids and remaining fully upright during and 30 minutes after meals. Recommend meds crushed in puree given results of esophagram. See Radiologist report.   General Therapy Interventions   Planned Therapy Interventions Dysphagia Treatment   Dysphagia treatment Instruction of safe swallow strategies;Compensatory strategies for swallowing   Intervention Comments 1-2 sessions for swallow strategies   SLP Therapy Assessment/Plan   Criteria for Skilled Therapeutic Interventions Met (SLP Eval) yes;treatment indicated   SLP Diagnosis dysphagia   Rehab Potential (SLP Eval) good, to achieve stated therapy goals   Predicted Duration of Therapy Intervention (SLP Eval) 2 sessions   Comment, Therapy Assessment/Plan (SLP) SLP to review and f/u on swallow strategies to compensate for cricopharyngeal dysfunctiona and esophageal dysphagia    Total Evaluation Time   Total Evaluation Time (Minutes) 10   Coping Strategies   Trust Relationship/Rapport questions answered;questions encouraged;care explained

## 2022-01-18 NOTE — PROGRESS NOTES
Hospitalist Progress Note    Assessment/Plan  Amelia Fry is a 81 year old female admitted on 1/17/2022. She was brought to the emergency department from home for evaluation of shortness of breath, cough, positive COVID home test and some confusion.     # Confirmed COVID-19 infection    # Acute Hypoxic Respiratory Failure secondary to COVID-19 infection  # Viral Pneumonia secondary to COVID-19 infection     Symptom Onset January 17, 2022   Date of 1st Positive Test January 17, 2022   Vaccination Status Fully Vaccinated         - COVID-19 special precautions, continuous pulse-ox  - Oxygen: continue to monitor on room air (improved from 4 LPM to RA); titrate to keep SpO2 between 90-96%  - Labs: Standard COVID admission labs ordered (CBC with diff, CMP, INR, D-dimer, CRP), notable for elevated CRP 10.7 ->16.7,   - Imaging: no additional imaging needed at this time  - Breathing treatments: albuterol inhaler four times a day scheduled and PRN; avoid nebulizers in favor of MDIs   - IV fluids: ordered at a rate of 50 mL/hr; hydrate cautiously to avoid worsening respiratory status with volume overload.  - Antibiotics: indicated due to concern of bacterial superinfection; procalcitonin elevated 1.52, Unasyn ordered   - COVID-Focused Medications: Dexamethasone 6 mg x 10 days or until hospital discharge, started on January 17, 2022 and Remdesivir x 5 days or until hospital discharge, started on January 17, 2022  - DVT Prophylaxis: at high risk of thrombotic complications due to COVID-19 (DDimer = 2.16 ug/mL FEU (Ref range: 0.00 - 0.50 ug/mL FEU) ).          - PROPHYLACTIC dosing: lovenox 40mg daily        - consider anticoag on discharge for 30 days & until return to normal mobility     #Aspiration pneumonia, RLL infiltrate seen on the CT, elevated procalcitonin 1.52  VFSS evaluation today: Penetration with thin liquids.  Will order easy to chew diet and thin liquids per speech therapy recommendations  Esophagram shows  small sliding hiatal hernia and Schatzki's ring.  Patient reports that she already has appointment with [facility] February 16  Continue IV antibiotics     #Acute metabolic encephalopathy  Likely related to acute viral infection  Patient seems to be significantly improved    #Hypokalemia, hypomagnesemia -replacements ordered     #Multiple pathologic compression fractures, chronic back pain  Likely secondary to multiple myeloma  Opiate dependence, drug-induced polyneuropathy  Hold gabapentin until encephalopathy improves and when able to take by mouth  Appreciate palliative care input     #Hyponatremia - slightly improved since yesterday   and congested lung sounds likely secondary to aspiration pneumonia, less likely from volume overload  Urine sodium is high 143 more consistent with SIADH  Hold off on further IV fluids     #Hyponatremia, hypomagnesemia  Replace per protocol     #Hypothyroidism  Resume oral levothyroxine     #Glaucoma, legal blindness of left eye  Crusty exudates from right eye but no injected conjunctiva likely related to multiple eyedrops  Resume PTA medications       Barriers to Discharge: IV antibiotics, swallowing assessment    Anticipated discharge date/Disposition: TBD pending PT/OT evaluation    Subjective  Patient reports improvement in her breathing.  Currently she is on room air.  Continues to have some cough.  She was also noted to cough while she was taking liquid potassium.      Objective    Vital signs in last 24 hours  Temp:  [98.3  F (36.8  C)-99.1  F (37.3  C)] 98.3  F (36.8  C)  Pulse:  [] 92  Resp:  [16-29] 16  BP: (110-160)/(54-84) 127/81  SpO2:  [90 %-99 %] 92 % @LASTSAO2(12)@ O2 Device: None (Room air)    Weight:   Wt Readings from Last 3 Encounters:   01/12/22 51.7 kg (114 lb)   09/02/21 54.4 kg (120 lb)   08/11/15 51.7 kg (114 lb)      Weight change:     Intake/Output last 3 shifts  I/O last 3 completed shifts:  In: 729.17 [I.V.:529.17; IV Piggyback:200]  Out: -    There is no height or weight on file to calculate BMI.    Physical Exam    General Appearance:    Alert, cooperative, no distress, appears stated age   Lungs:    Coarse breath sounds bilaterally   Cardiovascular:    Regular rate ands rhythm.  Nornal S1, S2.  No murmur, rub or gallop.  No edema   Abdomen:     Soft, non-tender, bowel sounds active all four quadrants,     no masses, no organomegaly   Neurologic:   Awake, alert,  Grossly nonfocal      Pertinent Labs   Lab Results: personally reviewed.   Recent Labs   Lab 01/18/22  0532 01/17/22  1713 01/12/22  1041   * 129* 131*   CO2 20* 26 25   BUN 11 12 14   ALBUMIN  --  3.1* 3.2*   ALKPHOS  --  89 160*   ALT  --  31 181*   AST  --  25 151*     Recent Labs   Lab 01/18/22  0532 01/17/22  1713 01/12/22  1041   WBC 5.0 4.1 7.6   HGB 12.4 12.8 13.8   HCT 37.5 38.5 41.0    170 177     Recent Labs   Lab 01/17/22  1713   TROPONINI 0.02     Invalid input(s): POCGLUFGR    Medications  Current Facility-Administered Medications   Medication     remdesivir 100 mg in sodium chloride 0.9 % 250 mL intermittent infusion    And     0.9% sodium chloride BOLUS     acetaminophen (TYLENOL) Suppository 650 mg     albuterol (PROVENTIL) neb solution 2.5 mg     ampicillin-sulbactam (UNASYN) 3 g vial to attach to  mL bag     brimonidine (ALPHAGAN P) 0.1 % ophthalmic solution 1 drop     carboxymethylcellulose PF (REFRESH PLUS) 0.5 % ophthalmic solution 1 drop     dexamethasone (DECADRON) injection 6 mg     dorzolamide-timolol (COSOPT) ophthalmic solution 1 drop     enoxaparin ANTICOAGULANT (LOVENOX) injection 25 mg     famotidine (PEPCID) injection 20 mg     latanoprost (XALATAN) 0.005 % ophthalmic solution 1 drop     lidocaine (LMX4) cream     lidocaine 1 % 0.1-1 mL     Medication instructions: Do NOT use nebulized medications     morphine (PF) injection 2 mg     morphine sulfate (ROXANOL) 20 mg/mL (HIGH CONC) soln 10 mg     naloxone (NARCAN) injection 0.2 mg    Or      naloxone (NARCAN) injection 0.4 mg    Or     naloxone (NARCAN) injection 0.2 mg    Or     naloxone (NARCAN) injection 0.4 mg     netarsudil (RHOPRESSA) 0.02 % ophthalmic solution 1 drop     ondansetron (ZOFRAN-ODT) ODT tab 4 mg    Or     ondansetron (ZOFRAN) injection 4 mg     prochlorperazine (COMPAZINE) injection 5 mg    Or     prochlorperazine (COMPAZINE) tablet 5 mg    Or     prochlorperazine (COMPAZINE) suppository 12.5 mg     sodium chloride (PF) 0.9% PF flush 3 mL     sodium chloride (PF) 0.9% PF flush 3 mL     Current Outpatient Medications   Medication     acetaminophen (TYLENOL) 325 MG tablet     aspirin 81 mg chewable tablet     brimonidine (ALPHAGAN P) 0.1 % ophthalmic solution     cholecalciferol 25 MCG (1000 UT) TABS     dorzolamide-timolol (COSOPT) 2-0.5 % ophthalmic solution     gabapentin (NEURONTIN) 300 MG capsule     levothyroxine (SYNTHROID, LEVOTHROID) 100 MCG tablet     loperamide (IMODIUM) 2 MG capsule     MELALEUCA SC     netarsudil (RHOPRESSA) 0.02 % ophthalmic solution     ondansetron (ZOFRAN) 8 MG tablet     oxyCODONE (OXYCONTIN) 30 MG 12 hr tablet     polyethylene glycol (MIRALAX) 17 GM/Dose powder     potassium chloride SA (K-DUR,KLOR-CON) 10 MEQ tablet     senna (SENOKOT) 8.6 MG tablet     tafluprost, PF, (ZIOPTAN, PF,) 0.0015 % Dpet ophthalmic dropperette     vitamin E 400 unit capsule       Pertinent Radiology   Radiology Results: Personally reviewed   Results for orders placed or performed during the hospital encounter of 01/17/22   CT Chest Pulmonary Embolism w Contrast    Impression    IMPRESSION:    1.  Motion artifact.    2.  No definitive pulmonary embolism.    3.  Relatively extensive right lower lobe endobronchial contents raising concern for aspiration. A few infectious/ inflammatory appearing nodular pulmonary opacities are also identified.    4.  Moderate airway thickening suggesting bronchitis. Mild bronchiectasis. Interval circumferential nonspecific tracheal wall  thickening. Consider pulmonary consultation.    5.  Suspicious osseous findings are present, to include diffuse mottled appearance of the vertebral bodies (which is exaggerated by severe bony demineralization), however, other lucent rib lesions are identified, as well as an expansile anterior right   fourth rib lesion. Findings suggest neoplasm or metastatic disease (to include myelomatous disease).    6.  Multilevel vertebral compression fractures, some new since older imaging (though ultimately age-indeterminate).    7.  Please see report for complete detail.       XR Video Swallow with SLP or OT    Impression    IMPRESSION:  1.  Penetration without aspiration.     XR Esophagram    Impression    IMPRESSION:  1.  Small sliding hiatal hernia with a Schatzki's ring. A 13 mm barium tablet was impeded by the Schatzki's ring suggesting possible clinically significant stenosis.         Advanced Care Planning:  Discharge Planning discussed with patient and nursing staff      Tana Boo MD  Internal Medicine Hospitalist  1/18/2022

## 2022-01-18 NOTE — CONSULTS
[facility]  Palliative Care Consultation Note    Patient: Amelia Fry  Date of Admission:  1/17/2022    Requesting Clinician / Team: Dr. Deshpande  Reason for consult: Pain management  Goals of care    Recommendations:  Goals of care per patient  - Restorative intent  - Pain control  - She wants to pursue her cancer treatments when able.  Per family report, her last dose of chemo was on 1/6/2022.  Her chemo session on 1/13 was aborted d/t severe constipation  - Code Status: No CPR- Do NOT Intubate - confirmed     Symptom management  - Agree to hold gabepentin given AMS  - Given NPO status will start SL morphine 10mg q6h to account for cross tolerance as currently unable to take her PTA oxycontin.  Continue IV morphine prn for breakthrough     Psychosocial/spiritual support  - good support from son and her sister     Advanced Care Planning  - Patient has a completed Health Care Directive: yes  - Health care agent: primary son Nir, alternate is sister Kate        Thank you for the opportunity to participate in the care of this patient and family. Our team: will continue to follow.     During regular M-F work hours -- if you are not sure who specifically to contact -- please contact us by calling us directly at the Palliative Care Main Line [phone]    After regular work hours and on weekends/holidays, you can leave a message at [phone]    Assessments:  81 yoF with multiple myeloma, chronic back pain admitted on 1/17/2022, positive with COVID, AMS, dyspnea.  Imaging shows likley aspiration pneumonia, and suspicious osseous findings suggestive of metastatic disease and new vertebral compression fractures.       1. Chronic pain, cancer related.  PTA, on Gabapentin 300mg HS and Oxycontin 30mg q12h which is equipotent to ~96mg morphine daily.   2. Bowels: hx constipation.  Loose stools today   3. Nausea: stable   4. Dysphagia with aspiration pneumonia on abx  5. Covid + on room air      Prognosis, Goals, & Planning:      Functional Status just prior to hospitalization:     2 (Ambulatory and capable of all selfcare but unable to carry out any work activities; may need help with IADLs up and about > 50% of waking hours)-3      Prognosis, Goals, and/or Advance Care Planning were addressed today: Yes        Summary/Comments: confirmed DNR/I codes status      Patient's decision making preferences: shared with support from loved ones          Patient has decision-making capacity today for complex decisions: Yes            I have concerns about the patient/family's health literacy today: No           Coping, Meaning, & Spirituality:   Mood, coping, and/or meaning in the context of serious illness were addressed today: No    Social:   - Lives with her sister Kate who is her primary caregiver  - Son Nir  -      History of Present Illness:  History gathered today from: patient, family/loved ones, medical chart, medical team members    81 year old female with multiple myeloma, chronic back pain, opiate dependence, vertebral fractures, drug-induced polyneuropathy, glaucoma, essential hypertension.  Patient lives with her sister who noted some increase shortness of breath and cough since the morning.  She was febrile and a home COVID test was positive.  She was later noted to become more lethargic and confused so EMS was called.  Her oxygen saturation was 86% on room air and placed on 6 L via nasal cannula prior to arrival.  Her son reports difficulty swallowing and is not surprised that she aspirated.      Today she is on room air.  She just got back from a video swallow and having some loose bowel movements.  She is oriented x4.  Notes the majority of her pain is in her lower back.  Tells me she is DNR/I.  Notes she missed her last chemo session d/t constipation.  She wants to continue these treatments when able.  Her main goal is better pain control.      I spoke with her son Nir and sister  "Kate via telephone.  Provided a clinical update.  Went over labs, imaging, vital signs, pain management.  Kate states she was doing better as of late and able to bathe herself, and was walking.  Their biggest concern is her weight loss, noting she's lost about 20lb since August.  Amelia follows with Dr. Elisabeth martinez [facility].  Her last chemo session on 1/6.         Key Palliative Symptom Data: (0=no symptom/no concern, 1=mild, 2=moderate, 3=severe):  Pain: 2  Dyspnea: 0  Fatigue: 1  Nausea: 0  Constipation: 0  Diarrhea: 1  Depressive symptoms: 0  Anxiety: 0  Drowsiness: 0    Patient is on opioids: assessed and bowels ok/no needed changes to plan of care today.    ROS:  A full 14 point review of systems was otherwise completed and is negative aside from that mentioned above     Current Problem List:   Principal Problem:    Pneumonia due to 2019 novel coronavirus  Active Problems:    Chronic back pain    Hypothyroidism, unspecified type    Drug-induced polyneuropathy (H)    Glaucoma    Aspiration pneumonia (H)    Acute respiratory failure with hypoxia (H)    Acute metabolic encephalopathy       Past Medical History:  History reviewed. No pertinent past medical history.     Past Surgical History:  Past Surgical History:   Procedure Laterality Date     EYE SURGERY           Family History:  History reviewed. No pertinent family history.  No family status information on file.       Social History:    she  reports that she has never smoked. She has never used smokeless tobacco. She reports current alcohol use. She reports that she does not use drugs.     Allergies:  Allergies   Allergen Reactions     Codeine Anaphylaxis     Chest pressure, shortness of breath     External Allergen Needs Reconciliation - See Comment Unknown     UNABLE TO ASSESS, States \"all opiates.\"        Medications:  I have reviewed this patient's medication profile and medications from this hospitalization.   Noted:  (Not in a hospital " admission)      Emergency Contact Info (Pulled from chart)  Extended Emergency Contact Information  Primary Emergency Contact: Nir Fry  Address: [address] [facility] [place] [address] [place]  Home Phone: [phone]  Mobile Phone: [phone]  Relation: Son  Secondary Emergency Contact: Kate Osuna  Address: [address] [place] [address] [place]  Home Phone: [phone]  Mobile Phone: [phone]  Relation: Sister    Physical Exam:  Vital Signs: Blood pressure 113/84, pulse 76, temperature 98.3  F (36.8  C), temperature source Oral, resp. rate 16, SpO2 93 %.   GENERAL: sitting on commode in NAD    SKIN: Warm and dry   HEENT: Normocephalic  LUNGS: Clear to auscultation anterolaterally; non-labored   CARDIAC: RRR, normal s1/s2, w/o m/r/g   ABDOMINAL: BS (+), soft, non distended, non tender  EXTREMITIES: No edema or cyanosis, pulses 2+ and symmetrical  NEUROLOGIC: A&Ox4  PSYCH: calm    Data reviewed:  Recent imaging reviewed:   CT CHEST PULMONARY EMBOLISM W CONTRAST 1/17/2022                         IMPRESSION:     1.  Motion artifact.     2.  No definitive pulmonary embolism.     3.  Relatively extensive right lower lobe endobronchial contents raising concern for aspiration. A few infectious/ inflammatory appearing nodular pulmonary opacities are also identified.     4.  Moderate airway thickening suggesting bronchitis. Mild bronchiectasis. Interval circumferential nonspecific tracheal wall thickening. Consider pulmonary consultation.     5.  Suspicious osseous findings are present, to include diffuse mottled appearance of the vertebral bodies (which is exaggerated by severe bony demineralization), however, other lucent rib lesions are identified, as well as an expansile anterior right   fourth rib lesion. Findings suggest neoplasm or metastatic disease (to include myelomatous disease).     6.  Multilevel vertebral compression fractures, some new since older imaging  (though ultimately age-indeterminate).     7.  Please see report for complete detail.  ---------------  XR VIDEO SWALLOW 1/18/2022         IMPRESSION:  1.  Penetration without aspiration.   ----------------  XR ESOPHAGRAM 1/18/2022                                   IMPRESSION:  1.  Small sliding hiatal hernia with a Schatzki's ring. A 13 mm barium tablet was impeded by the Schatzki's ring suggesting possible clinically significant stenosis.     Recent lab data reviewed:   Last Comprehensive Metabolic Panel:  Sodium   Date Value Ref Range Status   01/18/2022 132 (L) 136 - 145 mmol/L Final     Potassium   Date Value Ref Range Status   01/18/2022 3.2 (L) 3.5 - 5.0 mmol/L Final     Chloride   Date Value Ref Range Status   01/18/2022 102 98 - 107 mmol/L Final     Carbon Dioxide (CO2)   Date Value Ref Range Status   01/18/2022 20 (L) 22 - 31 mmol/L Final     Anion Gap   Date Value Ref Range Status   01/18/2022 10 5 - 18 mmol/L Final     Glucose   Date Value Ref Range Status   01/18/2022 130 (H) 70 - 125 mg/dL Final     Urea Nitrogen   Date Value Ref Range Status   01/18/2022 11 8 - 28 mg/dL Final     Creatinine   Date Value Ref Range Status   01/18/2022 0.61 0.60 - 1.10 mg/dL Final     GFR Estimate   Date Value Ref Range Status   01/18/2022 90 >60 mL/min/1.73m2 Final     Comment:     Effective December 21, 2021 eGFRcr in adults is calculated using the 2021 CKD-EPI creatinine equation which includes age and gender (Leonel et al., NEJ, DOI: 10.1056/KWKKnf3207814)   09/11/2020 >60 >60 mL/min/1.73m2 Final     Calcium   Date Value Ref Range Status   01/18/2022 8.1 (L) 8.5 - 10.5 mg/dL Final     Bilirubin Total   Date Value Ref Range Status   01/17/2022 0.5 0.0 - 1.0 mg/dL Final     Alkaline Phosphatase   Date Value Ref Range Status   01/17/2022 89 45 - 120 U/L Final     ALT   Date Value Ref Range Status   01/17/2022 31 0 - 45 U/L Final     AST   Date Value Ref Range Status   01/17/2022 25 0 - 40 U/L Final     Lab Results    Component Value Date    WBC 5.0 01/18/2022     Lab Results   Component Value Date    RBC 3.76 01/18/2022     Lab Results   Component Value Date    HGB 12.4 01/18/2022     Lab Results   Component Value Date    HCT 37.5 01/18/2022     No components found for: MCT  Lab Results   Component Value Date     01/18/2022     Lab Results   Component Value Date    MCH 33.0 01/18/2022     Lab Results   Component Value Date    MCHC 33.1 01/18/2022     Lab Results   Component Value Date    RDW 14.6 01/18/2022     Lab Results   Component Value Date     01/18/2022     Procal 1.52  CRP 16.7(trending up)             ====================================================  TT: I have personally spent a total of 80 minutes on the unit in review of medical record, consultation with the medical providers and assessment of patient today, with more than 50% of this time spent in counseling, coordination of care, and discussion with patient and family re: diagnostic results, prognosis, symptom management, risks and benefits of management options, and development of plan of care as noted above.  ====================================================    JOE Sánchez [facility]  Palliative Medicine  Pager [phone]  Office: [phone]

## 2022-01-18 NOTE — PROGRESS NOTES
Speech-Language Pathology: Clinical Swallow Evaluation     01/18/22 0900   General Information   Onset of Illness/Injury or Date of Surgery 01/17/22   Pertinent History of Current Problem Concern for aspiration given presentation on chest CT;  Per MD note: 81 year old female admitted on 1/17/2022. She was brought to the emergency department from home for evaluation of shortness of breath, cough, positive COVID home test and some confusion.   General Observations Alert and cooperative   Past History of Dysphagia No records located   Type of Evaluation   Type of Evaluation Swallow Evaluation   Oral Motor   Oral Musculature generally intact  (general weakness and decreased agility c/w age and illness)   Dentition (Oral Motor)   Dentition (Oral Motor) natural dentition   Facial Symmetry (Oral Motor)   Facial Symmetry (Oral Motor) WNL   Lip Function (Oral Motor)   Lip Range of Motion (Oral Motor) WNL   Tongue Function (Oral Motor)   Tongue ROM (Oral Motor) WNL   General Swallowing Observations   Comment, General Swallowing Observations Patient reports recent trouble swallowing, primarily with needing to chew food for extended amount of time and trouble with 'bubbles' and air after swallowing   Swallowing Evaluation Clinical swallow evaluation   Clinical Swallow Evaluation   Feeding Assistance minimal assistance required   Clinical Swallow Evaluation Textures Trialed thin liquids;pureed;solid foods   Clinical Swallow Eval: Thin Liquid Texture Trial   Mode of Presentation, Thin Liquids straw   Volume of Liquid or Food Presented 3 ounces   Oral Phase of Swallow WFL   Pharyngeal Phase of Swallow intact   Diagnostic Statement No s/s aspiration. Mild signs of dysphagia with audible swallow; decreased with small, single sips   Clinical Swallow Evaluation: Puree Solid Texture Trial   Mode of Presentation, Puree spoon   Volume of Puree Presented 4 bites   Oral Phase, Puree WFL   Pharyngeal Phase, Puree intact   Diagnostic  Statement No s/s aspiration or dysphagia   Clinical Swallow Evaluation: Solid Food Texture Trial   Mode of Presentation self-fed;fed by clinician   Volume Presented 4 bites cracker   Oral Phase WFL  (mildly prolonged mastication; slow but purposeful)   Pharyngeal Phase intact   Diagnostic Statement No overt s/s aspiration or pharyngeal dysphagia. Mild prolonged mastication but appropriate and patient compensates with slow rate of intake. Reported mild indigestion/bubbles after intake.    Esophageal Phase of Swallow   Patient reports or presents with symptoms of esophageal dysphagia Yes   Esophageal comments Reports symptoms of indigestion, bubbling and air after swallowing. Endorses history of reflux. There is concern for esosphageal dysphagia. Instructed patient on strategies to compensate.    Swallowing Recommendations   Diet Consistency Recommendations thin liquids (level 0);easy to chew (level 7)   Supervision Level for Intake distant supervision needed   Mode of Delivery Recommendations bolus size, small;slow rate of intake   Swallowing Maneuver Recommendations alternate food and liquid intake   Monitoring/Assistance Required (Eating/Swallowing) monitor for cough or change in vocal quality with intake   Recommended Feeding/Eating Techniques (Swallow Eval) set-up and prepare tray;minimize distractions during oral intake;maintain upright sitting position for eating;maintain upright posture during/after eating for 30 minutes   Medication Administration Recommendations, Swallowing (SLP) per patient preference; one pill at a time; she prefers OJ   Instrumental Assessment Recommendations VFSS (videofluroscopic swallowing study)  (consider esophagram)   Comment, Swallowing Recommendations BSS completed. No overt or consistent s/s aspiration or pharyngeal dysphagia. Patient does have increased audible swallow with thin liquids intermittently but largely resolved with small, single sips. Consider esophagram as patient  presents with s/s esophageal dysphagia. Recommend VFSS given right lower lobe endobrachial contents noted on Chest CT which raises concerns for aspiration.   General Therapy Interventions   Planned Therapy Interventions Dysphagia Treatment   Dysphagia treatment Instruction of safe swallow strategies;Compensatory strategies for swallowing   Intervention Comments Evaluation plus one session for swallow strategies   SLP Therapy Assessment/Plan   Criteria for Skilled Therapeutic Interventions Met (SLP Eval) yes;treatment indicated   SLP Diagnosis dysphagia   Rehab Potential (SLP Eval) good, to achieve stated therapy goals   Therapy Frequency (SLP Eval) one time eval and treatment only   Predicted Duration of Therapy Intervention (SLP Eval) 1 session   Coping Strategies   Trust Relationship/Rapport questions answered;questions encouraged;care explained;choices provided

## 2022-01-18 NOTE — PLAN OF CARE
Problem: SLP General Care Plan  Goal: Swallow Goal: Safely tolerate diet without aspiration (SLP)  Description: Safely tolerate diet without signs/symptoms of aspiration  1/18/2022 0948 by Barbara Alexander, SLP  Outcome: Completed  1/18/2022 0948 by Barbara Alexander, SLP  Flowsheets (Taken 1/18/2022 0948)  SLP Swallow Goal:  Safely tolerate diet without signs/symptoms of aspiration:    Easy to chew diet    Thin liquids    With use of swallow precautions    With use of compensatory swallow strategies

## 2022-01-18 NOTE — H&P
[facility]    History and Physical - Hospitalist Service       Date of Admission:  1/17/2022    Assessment & Plan      Amelia Fry is a 81 year old female admitted on 1/17/2022. She was brought to the emergency department from home for evaluation of shortness of breath, cough, positive COVID home test and some confusion.    # Confirmed COVID-19 infection    # Acute Hypoxic Respiratory Failure secondary to COVID-19 infection  # Viral Pneumonia secondary to COVID-19 infection     Symptom Onset January 17, 2022   Date of 1st Positive Test January 17, 2022   Vaccination Status Fully Vaccinated       - COVID-19 special precautions, continuous pulse-ox  - Oxygen: continue current support with nasal cannula at 4 L/min; titrate to keep SpO2 between 90-96%  - Labs: Standard COVID admission labs ordered (CBC with diff, CMP, INR, D-dimer, CRP).   - Imaging: no additional imaging needed at this time  - Breathing treatments: albuterol inhaler four times a day scheduled and PRN; avoid nebulizers in favor of MDIs   - IV fluids: ordered at a rate of 50 mL/hr; hydrate cautiously to avoid worsening respiratory status with volume overload.  - Antibiotics: indicated due to concern of bacterial superinfection; Unasyn ordered   - COVID-Focused Medications: Dexamethasone 6 mg x 10 days or until hospital discharge, started on January 17, 2022 and Remdesivir x 5 days or until hospital discharge, started on January 17, 2022  - DVT Prophylaxis: at high risk of thrombotic complications due to COVID-19 (DDimer = 2.16 ug/mL FEU (Ref range: 0.00 - 0.50 ug/mL FEU) ).          - PROPHYLACTIC dosing: lovenox 40mg daily        - consider anticoag on discharge for 30 days & until return to normal mobility    #Aspiration pneumonia  Nothing by mouth  SLP evaluation  Continue IV antibiotics    #Acute metabolic encephalopathy  Likely related to acute viral infection  Patient seems to be improving, is able to follow commands  and has no motor deficits  Monitor, consider brain imaging if worsens    #Multiple pathologic compression fractures, chronic back pain  Likely secondary to multiple myeloma  Opiate dependence, drug-induced polyneuropathy  Hold gabapentin until encephalopathy improves and when able to take by mouth  Palliative care consult for goals of care and pain medication management    #Hyponatremia  Likely from volume depletion   and congested lung sounds likely secondary to aspiration pneumonia, less likely from volume overload  Check urine osmolality, urine sodium  Cautious hydration overnight and reassess    #Hyponatremia, hypomagnesemia  Replace per protocol    #Hypothyroidism  Consider IV levothyroxine if unable to resume oral dose in the next 48 hours    #Glaucoma, legal blindness of left eye  Crusty exudates from right eye but no injected conjunctiva likely related to multiple eyedrops  Resume PTA medications         Diet: NPO for Medical/Clinical Reasons Except for: No Exceptions    DVT Prophylaxis: Enoxaparin (Lovenox) SQ  Quintana Catheter: Not present  Central Lines: None  Code Status: No CPR- Do NOT Intubate      Clinically Significant Risk Factors Present on Admission         # Hyponatremia: Na = 129 mmol/L (Ref range: 136 - 145 mmol/L) on admission, will monitor as appropriate      # Platelet Defect: home medication list includes an antiplatelet medication       Disposition Plan   Expected Discharge:  after at least 2 midnights  Anticipated discharge location:  Awaiting care coordination huddle  Delays:    Pneumonia, hypoxia, electrolyte abnormalities       The patient's care was discussed with the Patient's Family.    Ashwin Deshpande MD  [facility]  Securely message with the Vocera Web Console (learn more here)  Text page via [employer] Paging/Directory        ______________________________________________________________________    Chief Complaint   Cough, shortness of breath, fever,  confusion, positive home COVID test    History is obtained from the patient, electronic health record and emergency department physician    History of Present Illness   Amelia Fry is a 81 year old female who was brought to the emergency department by ambulance for evaluation of above chief complaint.  The information is obtained from her son Nir Fry at [phone].  Past medical history of multiple myeloma, chronic back pain, opiate dependence, vertebral fractures, drug-induced polyneuropathy, glaucoma, essential hypertension.  Patient lives with her sister who noted some increase shortness of breath and cough since the morning.  She was febrile and a home COVID test was positive.  She was later noted to become more lethargic and confused so EMS was called.  Her oxygen saturation was 86% on room air and placed on 6 L via nasal cannula prior to arrival.  Her son reports difficulty swallowing and is not surprised that she aspirated.  Patient is arousable, becoming more alert, moves all 4 extremities and is able to follow commands.    Review of Systems    The 10 point Review of Systems is negative other than noted in the HPI or here.     Past Medical History    I have reviewed this patient's medical history and updated it with pertinent information if needed.   History reviewed. No pertinent past medical history.    Past Surgical History   I have reviewed this patient's surgical history and updated it with pertinent information if needed.  Past Surgical History:   Procedure Laterality Date     EYE SURGERY         Social History   I have reviewed this patient's social history and updated it with pertinent information if needed.  Social History     Tobacco Use     Smoking status: Never Smoker     Smokeless tobacco: Never Used   Substance Use Topics     Alcohol use: Yes     Comment: Alcoholic Drinks/day: a few glasses of wine a month     Drug use: No       Family History     No significant family history,  including no history of:     Prior to Admission Medications   Prior to Admission Medications   Prescriptions Last Dose Informant Patient Reported? Taking?   MELALEUCA SC 1/17/2022 at am  Yes Yes   Sig: Take by mouth daily Melaleuca vitamin supplements containing - Multivitamin, minerals, calcium   acetaminophen (TYLENOL) 325 MG tablet Unknown  Yes Yes   Sig: Take 650 mg by mouth every 8 hours as needed for fever or pain    aspirin 81 mg chewable tablet 1/17/2022 at am  Yes Yes   Sig: Take 81 mg by mouth daily    brimonidine (ALPHAGAN P) 0.1 % ophthalmic solution 1/17/2022 at am  Yes Yes   Sig: Place 1 drop into the right eye 2 times daily    cholecalciferol 25 MCG (1000 UT) TABS 1/17/2022 at am  Yes Yes   Sig: Take 1,000 Units by mouth daily   dorzolamide-timolol (COSOPT) 2-0.5 % ophthalmic solution 1/17/2022 at am  Yes Yes   Sig: Place 1 drop into the right eye 2 times daily   gabapentin (NEURONTIN) 300 MG capsule 1/16/2022 at pm  Yes Yes   Sig: Take 300 mg by mouth every evening    levothyroxine (SYNTHROID, LEVOTHROID) 100 MCG tablet 1/17/2022 at am  Yes Yes   Sig: Take 100 mcg by mouth daily before breakfast    loperamide (IMODIUM) 2 MG capsule Unknown  Yes Yes   Sig: Take 2 mg by mouth 4 times daily as needed for diarrhea    netarsudil (RHOPRESSA) 0.02 % ophthalmic solution 1/16/2022 at pm  Yes Yes   Sig: Place 1 drop into the right eye At Bedtime   ondansetron (ZOFRAN) 8 MG tablet 1/17/2022 at am  Yes Yes   Sig: Take 8 mg by mouth every 8 hours as needed for nausea   oxyCODONE (OXYCONTIN) 30 MG 12 hr tablet 1/17/2022 at 0800  Yes Yes   Sig: Take 30 mg by mouth every 12 hours   polyethylene glycol (MIRALAX) 17 GM/Dose powder 1/17/2022 at am  No Yes   Sig: Take 17 g (1 capful) by mouth daily   potassium chloride SA (K-DUR,KLOR-CON) 10 MEQ tablet 1/17/2022 at am  Yes Yes   Sig: Take 10 mEq by mouth two times daily With food   senna (SENOKOT) 8.6 MG tablet 1/17/2022 at am  Yes Yes   Sig: Take 1-2 tablets by mouth 2  "times daily One tablet in the morning and two tablets in the evening   tafluprost, PF, (ZIOPTAN, PF,) 0.0015 % Dpet ophthalmic dropperette 1/16/2022 at pm  Yes Yes   Sig: Place 1 drop into the right eye At Bedtime    vitamin E 400 unit capsule 1/17/2022 at am  Yes Yes   Sig: [VITAMIN E 400 UNIT CAPSULE] Take 400 Units by mouth daily.      Facility-Administered Medications: None     Allergies   Allergies   Allergen Reactions     Codeine Anaphylaxis     Chest pressure, shortness of breath     External Allergen Needs Reconciliation - See Comment Unknown     UNABLE TO ASSESS, States \"all opiates.\"       Physical Exam   Vital Signs: Temp: 98.3  F (36.8  C) Temp src: Oral BP: (!) 147/67 Pulse: 86   Resp: 25 SpO2: 95 % O2 Device: Nasal cannula Oxygen Delivery: 4 LPM  Weight: 0 lbs 0 oz    Constitutional: fatigued, alert, cooperative and mild distress  Eyes: Right eye with dry exudates, mild conjunctival erythema from irritation  ENT: normocepalic, without obvious abnormality, atramatic, supraclavicular fat wasting  Hematologic / Lymphatic: no cervical lymphadenopathy and no supraclavicular lymphadenopathy  Respiratory: tachypneic, good air exchange and coarse sounds bilaterally  Cardiovascular: regular rate and rhythm and normal S1 and S2  GI: normal bowel sounds, soft, non-distended and non-tender  Skin: no bruising or bleeding and no redness, warmth, or swelling  Musculoskeletal: no lower extremity pitting edema present  tone is normal  Neurologic: Mental Status Exam:  Level of Alertness:   lethargic  Orientation:   person  Motor Exam:  moves all extremities well and symmetrically    Data   Data reviewed today: I reviewed all medications, new labs and imaging results over the last 24 hours. I personally reviewed the EKG tracing showing Sinus tachycardia at 110 bpm, RBBB, nonspecific ST waves inferior leads are changed when compared to EKG from September 2021.    Recent Labs   Lab 01/17/22  1713 01/12/22  1041   WBC 4.1 " 7.6   HGB 12.8 13.8   MCV 99 100    177   * 131*   POTASSIUM 3.1* 3.3*   CHLORIDE 96* 98   CO2 26 25   BUN 12 14   CR 0.70 0.69   ANIONGAP 7 8   ACACIA 8.5 9.0   * 149*   ALBUMIN 3.1* 3.2*   PROTTOTAL 8.8* 8.8*   BILITOTAL 0.5 0.9   ALKPHOS 89 160*   ALT 31 181*   AST 25 151*   LIPASE  --  <9     Recent Results (from the past 24 hour(s))   CT Chest Pulmonary Embolism w Contrast    Narrative    EXAM: CT CHEST PULMONARY EMBOLISM W CONTRAST  LOCATION: [facility]  DATE/TIME: 1/17/2022 7:17 PM    INDICATION: Shortness of breath.  COMPARISON: 09/20/2016.  TECHNIQUE: CT chest pulmonary angiogram during arterial phase injection of IV contrast. Multiplanar reformats and MIP reconstructions were performed. Dose reduction techniques were used.   CONTRAST: ISOVUE 370 100ML    FINDINGS:  ANGIOGRAM CHEST: Motion artifact. No obvious pulmonary embolism. Nonaneurysmal aorta without dissection. Mild atherosclerosis.    LUNGS AND PLEURA: Interval circumferential and diffuse tracheal wall thickening. Moderate airway thickening. Mild bronchiectasis. Lower lung predominant and relatively diffuse right lower lobe endobronchial contents. Few small lower lobe predominant   nodular opacities. Mild basilar atelectasis and volume loss. No pleural effusion or pneumothorax.    MEDIASTINUM/AXILLAE: No adenopathy. No pericardial effusion.    CORONARY ARTERY CALCIFICATION: Mild.    UPPER ABDOMEN: Unremarkable.    MUSCULOSKELETAL: Bony demineralization and degenerative changes.     Mottled appearance of the vertebral bodies. Multilevel mild to severe compression fractures are noted. New compression fractures at T2 (mild), T6 through T8 (mild-moderate) and L2 (mild superior endplate) since 2016. The severe T9, moderate T10 and mild   T12 compression fractures are without significant change. Marginally increased severe L1 compression fracture. Chronic sternal fracture also seen.    Interval expansile lesion  along the anterior right fourth rib measuring 1.5 x 5 cm (series 400.2, image 122). Other regions of lucent change, to include the anterior right seventh rib (image 173).      Impression    IMPRESSION:    1.  Motion artifact.    2.  No definitive pulmonary embolism.    3.  Relatively extensive right lower lobe endobronchial contents raising concern for aspiration. A few infectious/ inflammatory appearing nodular pulmonary opacities are also identified.    4.  Moderate airway thickening suggesting bronchitis. Mild bronchiectasis. Interval circumferential nonspecific tracheal wall thickening. Consider pulmonary consultation.    5.  Suspicious osseous findings are present, to include diffuse mottled appearance of the vertebral bodies (which is exaggerated by severe bony demineralization), however, other lucent rib lesions are identified, as well as an expansile anterior right   fourth rib lesion. Findings suggest neoplasm or metastatic disease (to include myelomatous disease).    6.  Multilevel vertebral compression fractures, some new since older imaging (though ultimately age-indeterminate).    7.  Please see report for complete detail.

## 2022-01-18 NOTE — ED NOTES
Assisted pt. From commode and back into bed after cleaning her up from another BM. Changed her depends and pt. Is resting in bed at this time.

## 2022-01-18 NOTE — PHARMACY-ADMISSION MEDICATION HISTORY
Pharmacy Note - Admission Medication History     ______________________________________________________________________    Prior To Admission (PTA) med list completed and updated in EMR.       PTA Med List   Medication Sig Last Dose     acetaminophen (TYLENOL) 325 MG tablet Take 650 mg by mouth every 8 hours as needed for fever or pain  Unknown     aspirin 81 mg chewable tablet Take 81 mg by mouth daily  1/17/2022 at am     brimonidine (ALPHAGAN P) 0.1 % ophthalmic solution Place 1 drop into the right eye 2 times daily  1/17/2022 at am     cholecalciferol 25 MCG (1000 UT) TABS Take 1,000 Units by mouth daily 1/17/2022 at am     dorzolamide-timolol (COSOPT) 2-0.5 % ophthalmic solution Place 1 drop into the right eye 2 times daily 1/17/2022 at am     gabapentin (NEURONTIN) 300 MG capsule Take 300 mg by mouth every evening  1/16/2022 at pm     levothyroxine (SYNTHROID, LEVOTHROID) 100 MCG tablet Take 100 mcg by mouth daily before breakfast  1/17/2022 at am     loperamide (IMODIUM) 2 MG capsule Take 2 mg by mouth 4 times daily as needed for diarrhea  Unknown     MELALEUCA SC Take by mouth daily Melaleuca vitamin supplements containing - Multivitamin, minerals, calcium 1/17/2022 at am     netarsudil (RHOPRESSA) 0.02 % ophthalmic solution Place 1 drop into the right eye At Bedtime 1/16/2022 at pm     ondansetron (ZOFRAN) 8 MG tablet Take 8 mg by mouth every 8 hours as needed for nausea 1/17/2022 at am     oxyCODONE (OXYCONTIN) 30 MG 12 hr tablet Take 30 mg by mouth every 12 hours 1/17/2022 at 0800     polyethylene glycol (MIRALAX) 17 GM/Dose powder Take 17 g (1 capful) by mouth daily 1/17/2022 at am     potassium chloride SA (K-DUR,KLOR-CON) 10 MEQ tablet Take 10 mEq by mouth two times daily With food 1/17/2022 at am     senna (SENOKOT) 8.6 MG tablet Take 1-2 tablets by mouth 2 times daily One tablet in the morning and two tablets in the evening 1/17/2022 at am     tafluprost, PF, (ZIOPTAN, PF,) 0.0015 % Dpet ophthalmic  dropperette Place 1 drop into the right eye At Bedtime  1/16/2022 at pm     vitamin E 400 unit capsule [VITAMIN E 400 UNIT CAPSULE] Take 400 Units by mouth daily. 1/17/2022 at am       Information source(s): Family member and CareEverywhere/SureScripts  Method of interview communication: phone    Summary of Changes to PTA Med List  New: Oxycontin, ondansetron, senna  Discontinued: cholestyramine, tramadol  Changed: gabapentin     Patient was asked about OTC/herbal products specifically.  PTA med list reflects this.    In the past week, patient estimated taking medication this percent of the time:  greater than 90%.    Allergies were reviewed, assessed, and updated with the patient.      Medications available for use during hospital stay: family to send 4 eye drops to the hospital for use.     The information provided in this note is only as accurate as the sources available at the time of the update(s).    Thank you for the opportunity to participate in the care of this patient.    Lauren Cedeno RPH  1/17/2022 7:02 PM

## 2022-01-18 NOTE — ED PROVIDER NOTES
"  Emergency Department Encounter     Evaluation Date & Time:   01/18/22  9:53 AM    Resident assisting with ED Boarders.  Approached by SLP regarding Ms. Fry.  Recommended to start Easy to Chew diet with thin liquids - order placed.    Further evaluation needed per SLP - recommended esophagram and VFSS. Both of these orders placed. Esophagram would need 4 hours NPO. SLP will coordinate with Rads. SLP recommended continuing diet in the meantime.    Selma \"Mellissa\" MD Paulo  [facility] Family Medicine Resident  P: [phone]    Emergency Medicine  [facility] EMERGENCY DEPARTMENT       Selma Bates MD  Resident  01/18/22 0955    "

## 2022-01-18 NOTE — ED NOTES
Pt up with assist to BSC . She noes dizziness when fisrt getting up. O2 sat 94 RA when up. KCL dose held until esophogram.

## 2022-01-19 ENCOUNTER — APPOINTMENT (OUTPATIENT)
Dept: SPEECH THERAPY | Facility: HOSPITAL | Age: 82
DRG: 177 | End: 2022-01-19
Payer: COMMERCIAL

## 2022-01-19 ENCOUNTER — APPOINTMENT (OUTPATIENT)
Dept: OCCUPATIONAL THERAPY | Facility: HOSPITAL | Age: 82
DRG: 177 | End: 2022-01-19
Attending: INTERNAL MEDICINE
Payer: COMMERCIAL

## 2022-01-19 LAB
ALBUMIN SERPL-MCNC: 2.6 G/DL (ref 3.5–5)
ALP SERPL-CCNC: 65 U/L (ref 45–120)
ALT SERPL W P-5'-P-CCNC: 24 U/L (ref 0–45)
ANION GAP SERPL CALCULATED.3IONS-SCNC: 9 MMOL/L (ref 5–18)
AST SERPL W P-5'-P-CCNC: 22 U/L (ref 0–40)
BILIRUB DIRECT SERPL-MCNC: 0.2 MG/DL
BILIRUB SERPL-MCNC: 0.5 MG/DL (ref 0–1)
BUN SERPL-MCNC: 22 MG/DL (ref 8–28)
C REACTIVE PROTEIN LHE: 15.8 MG/DL (ref 0–0.8)
CALCIUM SERPL-MCNC: 7.8 MG/DL (ref 8.5–10.5)
CHLORIDE BLD-SCNC: 102 MMOL/L (ref 98–107)
CO2 SERPL-SCNC: 22 MMOL/L (ref 22–31)
CREAT SERPL-MCNC: 0.66 MG/DL (ref 0.6–1.1)
D DIMER PPP FEU-MCNC: 1.43 UG/ML FEU (ref 0–0.5)
ERYTHROCYTE [DISTWIDTH] IN BLOOD BY AUTOMATED COUNT: 14.6 % (ref 10–15)
GFR SERPL CREATININE-BSD FRML MDRD: 88 ML/MIN/1.73M2
GLUCOSE BLD-MCNC: 110 MG/DL (ref 70–125)
HCT VFR BLD AUTO: 41 % (ref 35–47)
HGB BLD-MCNC: 13.3 G/DL (ref 11.7–15.7)
IL6 SERPL-MCNC: 94.18 PG/ML
MAGNESIUM SERPL-MCNC: 2 MG/DL (ref 1.8–2.6)
MAGNESIUM SERPL-MCNC: 2.1 MG/DL (ref 1.8–2.6)
MCH RBC QN AUTO: 32.8 PG (ref 26.5–33)
MCHC RBC AUTO-ENTMCNC: 32.4 G/DL (ref 31.5–36.5)
MCV RBC AUTO: 101 FL (ref 78–100)
PHOSPHATE SERPL-MCNC: 2.3 MG/DL (ref 2.5–4.5)
PLATELET # BLD AUTO: 187 10E3/UL (ref 150–450)
POTASSIUM BLD-SCNC: 3.8 MMOL/L (ref 3.5–5)
PROT SERPL-MCNC: 7.6 G/DL (ref 6–8)
RBC # BLD AUTO: 4.06 10E6/UL (ref 3.8–5.2)
SODIUM SERPL-SCNC: 133 MMOL/L (ref 136–145)
WBC # BLD AUTO: 6.8 10E3/UL (ref 4–11)

## 2022-01-19 PROCEDURE — 99232 SBSQ HOSP IP/OBS MODERATE 35: CPT | Performed by: NURSE PRACTITIONER

## 2022-01-19 PROCEDURE — 250N000011 HC RX IP 250 OP 636: Performed by: HOSPITALIST

## 2022-01-19 PROCEDURE — 250N000013 HC RX MED GY IP 250 OP 250 PS 637: Performed by: NURSE PRACTITIONER

## 2022-01-19 PROCEDURE — 250N000011 HC RX IP 250 OP 636: Performed by: INTERNAL MEDICINE

## 2022-01-19 PROCEDURE — 84100 ASSAY OF PHOSPHORUS: CPT | Performed by: INTERNAL MEDICINE

## 2022-01-19 PROCEDURE — 97535 SELF CARE MNGMENT TRAINING: CPT | Mod: GO

## 2022-01-19 PROCEDURE — 36415 COLL VENOUS BLD VENIPUNCTURE: CPT | Performed by: HOSPITALIST

## 2022-01-19 PROCEDURE — 250N000013 HC RX MED GY IP 250 OP 250 PS 637: Performed by: INTERNAL MEDICINE

## 2022-01-19 PROCEDURE — 83735 ASSAY OF MAGNESIUM: CPT | Performed by: INTERNAL MEDICINE

## 2022-01-19 PROCEDURE — 85379 FIBRIN DEGRADATION QUANT: CPT | Performed by: HOSPITALIST

## 2022-01-19 PROCEDURE — 120N000001 HC R&B MED SURG/OB

## 2022-01-19 PROCEDURE — 250N000009 HC RX 250: Performed by: HOSPITALIST

## 2022-01-19 PROCEDURE — 258N000003 HC RX IP 258 OP 636: Performed by: HOSPITALIST

## 2022-01-19 PROCEDURE — 92526 ORAL FUNCTION THERAPY: CPT | Mod: GN

## 2022-01-19 PROCEDURE — 99232 SBSQ HOSP IP/OBS MODERATE 35: CPT | Performed by: INTERNAL MEDICINE

## 2022-01-19 PROCEDURE — 97165 OT EVAL LOW COMPLEX 30 MIN: CPT | Mod: GO

## 2022-01-19 PROCEDURE — 82248 BILIRUBIN DIRECT: CPT | Performed by: HOSPITALIST

## 2022-01-19 PROCEDURE — 86140 C-REACTIVE PROTEIN: CPT | Performed by: HOSPITALIST

## 2022-01-19 PROCEDURE — 85027 COMPLETE CBC AUTOMATED: CPT | Performed by: HOSPITALIST

## 2022-01-19 PROCEDURE — 82310 ASSAY OF CALCIUM: CPT | Performed by: HOSPITALIST

## 2022-01-19 RX ORDER — MORPHINE SULFATE 20 MG/ML
5 SOLUTION ORAL EVERY 4 HOURS PRN
Status: DISCONTINUED | OUTPATIENT
Start: 2022-01-19 | End: 2022-01-20 | Stop reason: HOSPADM

## 2022-01-19 RX ORDER — FAMOTIDINE 20 MG/1
20 TABLET, FILM COATED ORAL 2 TIMES DAILY
Status: DISCONTINUED | OUTPATIENT
Start: 2022-01-19 | End: 2022-01-20 | Stop reason: HOSPADM

## 2022-01-19 RX ORDER — AMPICILLIN AND SULBACTAM 2; 1 G/1; G/1
3 INJECTION, POWDER, FOR SOLUTION INTRAMUSCULAR; INTRAVENOUS EVERY 6 HOURS
Status: DISCONTINUED | OUTPATIENT
Start: 2022-01-19 | End: 2022-01-20 | Stop reason: HOSPADM

## 2022-01-19 RX ORDER — MORPHINE SULFATE 20 MG/ML
10 SOLUTION ORAL EVERY 8 HOURS
Status: DISCONTINUED | OUTPATIENT
Start: 2022-01-19 | End: 2022-01-20 | Stop reason: HOSPADM

## 2022-01-19 RX ADMIN — MORPHINE SULFATE 10 MG: 20 SOLUTION ORAL at 12:38

## 2022-01-19 RX ADMIN — AMPICILLIN SODIUM AND SULBACTAM SODIUM 3 G: 2; 1 INJECTION, POWDER, FOR SOLUTION INTRAMUSCULAR; INTRAVENOUS at 17:40

## 2022-01-19 RX ADMIN — DORZOLAMIDE HYDROCHLORIDE AND TIMOLOL MALEATE 1 DROP: 20; 5 SOLUTION/ DROPS OPHTHALMIC at 09:11

## 2022-01-19 RX ADMIN — LATANOPROST 1 DROP: 50 SOLUTION OPHTHALMIC at 21:00

## 2022-01-19 RX ADMIN — SENNOSIDES 5 ML: 8.8 LIQUID ORAL at 20:55

## 2022-01-19 RX ADMIN — ASPIRIN 81 MG CHEWABLE TABLET 81 MG: 81 TABLET CHEWABLE at 09:07

## 2022-01-19 RX ADMIN — DEXAMETHASONE SODIUM PHOSPHATE 6 MG: 4 INJECTION, SOLUTION INTRA-ARTICULAR; INTRALESIONAL; INTRAMUSCULAR; INTRAVENOUS; SOFT TISSUE at 12:38

## 2022-01-19 RX ADMIN — FAMOTIDINE 20 MG: 10 INJECTION, SOLUTION INTRAVENOUS at 12:39

## 2022-01-19 RX ADMIN — AMPICILLIN SODIUM AND SULBACTAM SODIUM 3 G: 2; 1 INJECTION, POWDER, FOR SOLUTION INTRAMUSCULAR; INTRAVENOUS at 12:39

## 2022-01-19 RX ADMIN — AMPICILLIN SODIUM AND SULBACTAM SODIUM 3 G: 2; 1 INJECTION, POWDER, FOR SOLUTION INTRAMUSCULAR; INTRAVENOUS at 05:10

## 2022-01-19 RX ADMIN — REMDESIVIR 100 MG: 100 INJECTION, POWDER, LYOPHILIZED, FOR SOLUTION INTRAVENOUS at 17:59

## 2022-01-19 RX ADMIN — BRIMONIDINE TARTRATE 1 DROP: 1 SOLUTION/ DROPS OPHTHALMIC at 09:09

## 2022-01-19 RX ADMIN — SODIUM CHLORIDE 50 ML: 9 INJECTION, SOLUTION INTRAVENOUS at 18:04

## 2022-01-19 RX ADMIN — LEVOTHYROXINE SODIUM 100 MCG: 0.1 TABLET ORAL at 05:10

## 2022-01-19 RX ADMIN — FAMOTIDINE 20 MG: 20 TABLET, FILM COATED ORAL at 20:55

## 2022-01-19 RX ADMIN — BRIMONIDINE TARTRATE 1 DROP: 1 SOLUTION/ DROPS OPHTHALMIC at 20:54

## 2022-01-19 RX ADMIN — POTASSIUM & SODIUM PHOSPHATES POWDER PACK 280-160-250 MG 1 PACKET: 280-160-250 PACK at 09:07

## 2022-01-19 RX ADMIN — MORPHINE SULFATE 10 MG: 20 SOLUTION ORAL at 06:00

## 2022-01-19 RX ADMIN — ENOXAPARIN SODIUM 40 MG: 40 INJECTION SUBCUTANEOUS at 09:07

## 2022-01-19 RX ADMIN — DORZOLAMIDE HYDROCHLORIDE AND TIMOLOL MALEATE 1 DROP: 20; 5 SOLUTION/ DROPS OPHTHALMIC at 20:57

## 2022-01-19 RX ADMIN — MORPHINE SULFATE 10 MG: 20 SOLUTION ORAL at 00:28

## 2022-01-19 RX ADMIN — POTASSIUM & SODIUM PHOSPHATES POWDER PACK 280-160-250 MG 1 PACKET: 280-160-250 PACK at 20:55

## 2022-01-19 RX ADMIN — MORPHINE SULFATE 10 MG: 20 SOLUTION ORAL at 21:01

## 2022-01-19 RX ADMIN — POTASSIUM & SODIUM PHOSPHATES POWDER PACK 280-160-250 MG 1 PACKET: 280-160-250 PACK at 13:04

## 2022-01-19 ASSESSMENT — ACTIVITIES OF DAILY LIVING (ADL)
ADLS_ACUITY_SCORE: 21
ADLS_ACUITY_SCORE: 17
ADLS_ACUITY_SCORE: 21
ADLS_ACUITY_SCORE: 17
ADLS_ACUITY_SCORE: 17
ADLS_ACUITY_SCORE: 21
ADLS_ACUITY_SCORE: 17
ADLS_ACUITY_SCORE: 25
ADLS_ACUITY_SCORE: 21
ADLS_ACUITY_SCORE: 21
ADLS_ACUITY_SCORE: 17
ADLS_ACUITY_SCORE: 21
ADLS_ACUITY_SCORE: 17
ADLS_ACUITY_SCORE: 25
ADLS_ACUITY_SCORE: 21
DEPENDENT_IADLS:: COOKING

## 2022-01-19 NOTE — PROGRESS NOTES
[facility]  Palliative Care Daily Progress Note    Today, the patient was seen for:  Pain mgmt        Recommendations & Counseling     Symptom Management:  - Pain currently doing well with SL morphine.  Given esophogram results, SLP recommends crushed meds as 13 mm pills sticking in lower esophagus.  Unable to crush OxyContin.  Methadone liquid could be an option but given her goals are life prolonging for now and last QTc 481 on 1/17, would hold on this.    - Continue SL morphine.  Will decrease frequency to q8h from q6h given good control and she looks slightly sedated today.    - Discontinue prn IV morphine and start SL morphine 5mg q4h prn for breakthrough.  - Continue to hold Gabapentin for now   - start senna at bedtime to keep regular bowels     Goals of Care:  - Restorative intent.  Continue current treatments/therapies   - Pain control - met   - She wants to continue her cancer treatments when able        Assessments          81 yoF with multiple myeloma, chronic back pain admitted on 1/17/2022, positive with COVID, AMS, dyspnea.  Imaging shows likley aspiration pneumonia, and suspicious osseous findings suggestive of metastatic disease and new vertebral compression fractures.       1. Chronic pain, cancer related.  PTA, on Gabapentin 300mg HS and Oxycontin 30mg q12h which is equipotent to ~96mg morphine daily.  Started on SL morphine yesterday 10mg q6h.  She did not use any prn IV morphine in the past 24h.  Pain score ratings have been 0-1/10.  States her pain is doing well.  She does look slightly sedated.   2. Bowels: hx severe constipation.  Loose stools yesterday, improved today   3. Nausea: stable   4. Dysphagia with aspiration pneumonia on abx.  No aspiration on VFSS yesterday.  Now on regular diet.   5. Covid + on room air    Advanced Care Planning:   - HCD on file  - Health care agent: primary son Nir, alternate is sister Kate  - Code status: DNR/I   "    Psychosocial/Spiritual Support:  Lives with her sister Kate who is her primary caregiver  - Son Nir  -     Prognosis, Goals, or Advance Care Planning was addressed today with: Yes.  Mood, coping, and/or meaning in the context of serious illness were addressed today: Yes.            Interval History:     Chart review/discussion with unit or clinical team members:   Pain doing well.  Swallowed pills ok this morning per RN.  SLP recommends crushed meds.     Per patienty:  Feeling better today.  Denies sob, n/v.  No more loose stools today.  Pain well controlled.  \"What you're giving me is enough, I don't need anymore.\"     Per son Nir:  Updated on med recommendations above which he agrees with.  He thinks liquid pain meds would be great going forward as she's had longstanding issues with swallowing.      Key Palliative Symptoms: (0=no symptom/no concern, 1=mild, 2=moderate, 3=severe):  Pain: 0  Dyspnea: 0  Fatigue: 0  Nausea: 0  Constipation: 0  Diarrhea: 0  Depressive symptoms: 0  Anxiety: 0  Drowsiness: 1  Poor Appetite: 0  Insomnia: 0    Patient is on opioids: assessed and I made recommendations about bowel care as above.           Review of Systems:     A full 14 point review of systems was otherwise completed and is negative aside from that mentionedabove          Medications:     I have reviewed this patient's medication profile and medications during this hospitalization.    Noted meds:    Current Facility-Administered Medications   Medication     remdesivir 100 mg in sodium chloride 0.9 % 250 mL intermittent infusion    And     0.9% sodium chloride BOLUS     acetaminophen (TYLENOL) Suppository 650 mg     acetaminophen (TYLENOL) tablet 650 mg     albuterol (PROVENTIL) neb solution 2.5 mg     ampicillin-sulbactam (UNASYN) 3 g vial to attach to  mL bag     aspirin (ASA) chewable tablet 81 mg     brimonidine (ALPHAGAN P) 0.1 % ophthalmic solution 1 drop     carboxymethylcellulose PF (REFRESH " "PLUS) 0.5 % ophthalmic solution 1 drop     dexamethasone (DECADRON) injection 6 mg     dorzolamide-timolol (COSOPT) ophthalmic solution 1 drop     enoxaparin ANTICOAGULANT (LOVENOX) injection 40 mg     famotidine (PEPCID) injection 20 mg     latanoprost (XALATAN) 0.005 % ophthalmic solution 1 drop     levothyroxine (SYNTHROID/LEVOTHROID) tablet 100 mcg     lidocaine (LMX4) cream     lidocaine 1 % 0.1-1 mL     Medication instructions: Do NOT use nebulized medications     morphine sulfate (ROXANOL) 20 mg/mL (HIGH CONC) soln 10 mg     morphine sulfate (ROXANOL) 20 mg/mL (HIGH CONC) soln 5 mg     naloxone (NARCAN) injection 0.2 mg    Or     naloxone (NARCAN) injection 0.4 mg    Or     naloxone (NARCAN) injection 0.2 mg    Or     naloxone (NARCAN) injection 0.4 mg     netarsudil (RHOPRESSA) 0.02 % ophthalmic solution 1 drop     ondansetron (ZOFRAN-ODT) ODT tab 4 mg    Or     ondansetron (ZOFRAN) injection 4 mg     potassium & sodium phosphates (NEUTRA-PHOS) Packet 1 packet     prochlorperazine (COMPAZINE) injection 5 mg    Or     prochlorperazine (COMPAZINE) tablet 5 mg    Or     prochlorperazine (COMPAZINE) suppository 12.5 mg     sennosides (SENOKOT) syrup 5 mL     sennosides (SENOKOT) syrup 5 mL     sodium chloride (PF) 0.9% PF flush 3 mL     sodium chloride (PF) 0.9% PF flush 3 mL                Physical Exam:   Vital Signs: Blood pressure 125/64, pulse 77, temperature 98.2  F (36.8  C), temperature source Oral, resp. rate 18, height 1.651 m (5' 5\"), weight 51.2 kg (112 lb 14 oz), SpO2 92 %.   GENERAL: lying in bed in NAD    SKIN: Warm and dry   HEENT: Normocephalic, anicteric sclera, moist mucous membranes  LUNGS: Clear to auscultation anterolaterally; non-labored   CARDIAC: RRR, normal s1/s2, w/o m/r/g   ABDOMINAL: BS (+), soft, non distended, non tender  EXTREMITIES: No edema or cyanosis, pulses 2+ and symmetrical  NEUROLOGIC: Alert, following commands, appears slightly sedated   PSYCH: calm, pleasant              " Data Reviewed:     Reviewed recent pertinent imaging:   XR VIDEO SWALLOW 1/18/2022                                                                   IMPRESSION:  1.  Penetration without aspiration.  ----------  XR ESOPHAGRAM 1/18/2022                                                                   IMPRESSION:  1.  Small sliding hiatal hernia with a Schatzki's ring. A 13 mm barium tablet was impeded by the Schatzki's ring suggesting possible clinically significant stenosis.    Reviewed recent labs:  Last Comprehensive Metabolic Panel:  Sodium   Date Value Ref Range Status   01/19/2022 133 (L) 136 - 145 mmol/L Final     Potassium   Date Value Ref Range Status   01/19/2022 3.8 3.5 - 5.0 mmol/L Final     Chloride   Date Value Ref Range Status   01/19/2022 102 98 - 107 mmol/L Final     Carbon Dioxide (CO2)   Date Value Ref Range Status   01/19/2022 22 22 - 31 mmol/L Final     Anion Gap   Date Value Ref Range Status   01/19/2022 9 5 - 18 mmol/L Final     Glucose   Date Value Ref Range Status   01/19/2022 110 70 - 125 mg/dL Final     Urea Nitrogen   Date Value Ref Range Status   01/19/2022 22 8 - 28 mg/dL Final     Creatinine   Date Value Ref Range Status   01/19/2022 0.66 0.60 - 1.10 mg/dL Final     GFR Estimate   Date Value Ref Range Status   01/19/2022 88 >60 mL/min/1.73m2 Final     Comment:     Effective December 21, 2021 eGFRcr in adults is calculated using the 2021 CKD-EPI creatinine equation which includes age and gender (Leonel santiago al., NEJ, DOI: 10.1056/CWWLsg0162642)   09/11/2020 >60 >60 mL/min/1.73m2 Final     Calcium   Date Value Ref Range Status   01/19/2022 7.8 (L) 8.5 - 10.5 mg/dL Final     Bilirubin Total   Date Value Ref Range Status   01/19/2022 0.5 0.0 - 1.0 mg/dL Final     Alkaline Phosphatase   Date Value Ref Range Status   01/19/2022 65 45 - 120 U/L Final     ALT   Date Value Ref Range Status   01/19/2022 24 0 - 45 U/L Final     AST   Date Value Ref Range Status   01/19/2022 22 0 - 40 U/L Final      CBC RESULTS: Recent Labs   Lab Test 01/19/22  0635   WBC 6.8   RBC 4.06   HGB 13.3   HCT 41.0   *   MCH 32.8   MCHC 32.4   RDW 14.6                    ====================================================  TT: I have personally spent a total of 25 minutes on the unit in review of medical record, consultation with the medical providers and assessment of patient today, with more than 50% of this time spent in counseling, coordination of care, and discussion with patient and son re: symptom management, risks and benefits of management options, and development of plan of care as noted above.  ====================================================    Chadwick Block CNP  [facility]  Palliative Medicine  Pager [phone]  Office: [phone]

## 2022-01-19 NOTE — PLAN OF CARE
Problem: SLP General Care Plan  Goal: Swallow Goal: Safely tolerate diet without aspiration (SLP)  Description: Safely tolerate diet without signs/symptoms of aspiration  Outcome: Completed     Reviewed swallow study results and strategies. Trialed regular textures; safe and complete mastication and no s/s aspiration; Reviewed esophagram results with 13 mm pills sticking in lower esophagus and need for crushed meds. Patient verbalized understanding. No further ST needs at this time. Contact SLP if any changes or concerns.     Speech Language Therapy Discharge Summary    Reason for therapy discharge:    All goals and outcomes met, no further needs identified.    Progress towards therapy goal(s). See goals on Care Plan in [facility] electronic health record for goal details.  Goals met    Therapy recommendation(s):    No further therapy is recommended.

## 2022-01-19 NOTE — PROGRESS NOTES
Occupational Therapy       01/19/22 1537   Quick Adds   Type of Visit Initial Occupational Therapy Evaluation   Living Environment   People in home sibling(s)  (sister)   Current Living Arrangements house   Home Accessibility stairs to enter home   Number of Stairs, Main Entrance 2   Stair Railings, Main Entrance other (see comments)  (pt has grab bar to to assist getting up stairs)   Self-Care   Usual Activity Tolerance good   Regular Exercise No   Equipment Currently Used at Home walker, rolling   Activity/Exercise/Self-Care Comment Patient was ambulating without an AD   Disability/Function   Hearing Difficulty or Deaf no   Wear Glasses or Blind yes   Vision Management legally blind   Concentrating, Remembering or Making Decisions Difficulty no   Difficulty Communicating no   Walking or Climbing Stairs Difficulty no   Dressing/Bathing Difficulty no   Toileting issues no   Doing Errands Independently Difficulty (such as shopping) no   Fall history within last six months no   General Information   Onset of Illness/Injury or Date of Surgery 01/17/22   Referring Physician Tana Boo MD   Patient/Family Therapy Goal Statement (OT) Get back home   Existing Precautions/Restrictions   (COVID+)   Cognitive Status Examination   Orientation Status orientation to person, place and time   Visual Perception   Visual Impairment/Limitations legally blind   Range of Motion Comprehensive   General Range of Motion no range of motion deficits identified   Strength Comprehensive (MMT)   General Manual Muscle Testing (MMT) Assessment no strength deficits identified   Bed Mobility   Bed Mobility supine-sit;sit-supine   Supine-Sit Doyle (Bed Mobility) contact guard   Sit-Supine Doyle (Bed Mobility) contact guard   Transfers   Transfers sit-stand transfer   Sit-Stand Transfer   Sit-Stand Doyle (Transfers) contact guard   Clinical Impression   Criteria for Skilled Therapeutic Interventions Met (OT) yes;meets  criteria;skilled treatment is necessary   OT Diagnosis Decreased ADL independence   OT Problem List-Impairments impacting ADL problems related to;balance;activity tolerance impaired   Assessment of Occupational Performance 1-3 Performance Deficits   Identified Performance Deficits Trsfs, toileting   Planned Therapy Interventions (OT) ADL retraining;transfer training   Clinical Decision Making Complexity (OT) low complexity   Therapy Frequency (OT) Daily   Predicted Duration of Therapy 5 days   Risk & Benefits of therapy have been explained evaluation/treatment results reviewed   OT Discharge Planning    OT Discharge Recommendation (DC Rec) Home with assist   Total Evaluation Time (Minutes)   Total Evaluation Time (Minutes) 10

## 2022-01-19 NOTE — CONSULTS
Consultation    Amelia Fry MRN# [id number]   YOB: 1940 Age: 81 year old   Date of Admission: 1/17/2022  Requesting physician:   Reason for consult:            Assessment and Plan:       1. History of IgG multiple myeloma: Lambda light chains:  The patient was on maintenance Revlimid 20 mg daily 3 weeks on and 1 week off. Since her her IgG and lambda light chain were increasing, we stopped treatment with Revlimid on 7/14/21. Her Zometa was held in August of 2019 secondary to jaw pain and necrosis.     The patient initiated treatment with SQ daratumumab + Velcade on 9/2/21. Dexamethasone was held as she has increased risk of side effects on steroids because of glaucoma. Velcade is given on days 1, 8, and daratumumab is given on days 1, 8, and 15 of a 3-week cycle.     The patient went to the ED on 1/12/22 with abdominal pain. CT AP on 1/12/22 showed massive amount of stool throughout the colon and rectum with circumferential submucosal edema throughout the distal sigmoid colon and mid and upper rectum, edema in the mesorectum and sigmoid mesenteric fat and trace ascites. Some degree of obstruction may be present. The anterior extra osseous extension of the sacral myeloma into the prevertebral soft tissue and posterior mesorectal fat could be having some mass effect on the rectum.  Visible myeloma with pathologic fractures involving the T12-L4 vertebral bodies and lower ribs.    On 1/13/22,  velcade was held due to her ongoing constipation as velcade and narcotics were felt to contribute to her constipation. Daratumumab was also held. She was seen in the ER and did talk to the physician there and also they consulted with a surgeon. At this point treat expectantly.    We will continue to HOLD treatment with velcade and daratumumab.      2. Viral Pneumonia secondary to confirmed COVID-19 Infection  3. Acute hypoxic respiratory failure secondary to COVID-19 Infection  4. Aspiration  Pneumonia    Continue supportive cares for #2-4.      Our group hasnot been consulted. However, we will be following her peripherally. Case discussed with Dr. Ramos.            Michaela Schwarz PA-C  [facility]  [phone]  Available 8:00 AM - 4:00 PM         Chief Complaint:   Shortness of Breath           History of Present Illness:   This patient is a 81 year old female   Amelia Fry is an 81-year-old female with a history of IgG multiple myeloma, status post treatment with Velcade and continues on maintenance Revlimid 5 mg daily.    She initially presented here in 2013 and stated that she had some pain which started in May of this year in the low back, and gradually over the last few months, it has increased, and the pain, and also going up her back. She does report lifting something heavy that really made the pain worse and exacerbated the pain. The patient has had workup and has had physical therapy, and it was noted that she possibly had a monoclonal component in a urinary protein electrophoresis. Overall, her thoracic spine showed loss of vertebral body height, indeterminate-intensity with possible marrow replacement. In the lumbar spine, there was a focus of marrow replacement within the visualized sacrum at S2-S3 segments with mild osseous expansion, additional indeterminate foci of marrow replacement seen at L5 and within the posterior left iliac bone. These were all indeterminate in nature, but suspicious for osseous metastasis. Compression fracture L1-L4 suggesting acute compression fractures. No definite marrow replacement and retropulsion at L4 vertebral body contributes to mild amount of spinal canal stenosis.    Her MRI of lumbar spine on 7/8/21 showed diffuse marrow signal abnormality/ soft tissue type infiltration within the sacrum starting at S2 and extending inferiorly consistent with plasmacytoma. Moderate compression fracture deformities T12, L1, and L4 with no associated soft tissue  mass or neural compromise. Fractures late subacute to early chronic at L1 and chronic at the other levels. Lumbar spondylosis/disc degeneration.    MRI of the thoracic spine on 7/8/21 showed Multilevel vertebral marrow signal abnormality consistent with underlying history of multiple myeloma and marrow infiltration. Severe vertebral plana type fracture at T9 with moderate fractures at T10 and L1, late subacute to chronic in appearance. More edematous changes associated with T7 and T8 fracture/ marrow infiltration with no significant height loss of these vertebral bodies.    The patient received for radiation therapy to the sacrum from 8/5/21 - 8/18/21.     The patient initiated treatment with SQ daratumumab + Velcade on 9/2/21. Dexamethasone was held as she has increased risk of side effects on steroids because of glaucoma. After C1D1, the patient went to the hospital on 9/2/21 for leg weakness. MRI of the lumbar spine on 9/2/21 showed slight progression of sacral marrow replacing process with encroachment upon the mid sacral plexus. There is also interval development of an adjacent pelvic soft tissue mass which is incompletely visualized seen on sagittal images 4-12 of series 5 and 6.    Chest CT on 9/14/21 showed numerous lytic lesions throughout the visualized bones consistent with provided history of multiple myeloma. Compression fractures of T2, T6, T7, T9, T10 and L1. These are all age indeterminate.    The patient went to the ED on 1/12/22 with abdominal pain. CT AP on 1/12/22 showed massive amount of stool throughout the colon and rectum with circumferential submucosal edema throughout the distal sigmoid colon and mid and upper rectum, edema in the mesorectum and sigmoid mesenteric fat and trace ascites. Some degree of obstruction may be present. he anterior extra osseous extension of the sacral myeloma into the prevertebral soft tissue and posterior mesorectal fat could be having some mass effect on the  "rectum.  Visible myeloma with pathologic fractures involving the T12-L4 vertebral bodies and lower ribs.    The patient was last seen by Dr. Ramos on 22. On follow up, Amelia shared that she went to the ED on 22 with abdominal pain and was noted to have possible obstruction..She has been fatigued and constipated. She continues to have longstanding back and sciatica pain. She also has pain and discomfort in her left leg. She is currently on oxycodone 5 mg as needed +30 mg OxyContin every 12 hours. She takes MiraLax and Senna for constipation. She is trying to gain some weight. She is taking Zofran and tummy drops for nausea with some benefit. She has stable neuropathy. 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 Her PO intake is low. She does have intermittent dizziness. She notes her vision has decreased.     Per chart review she presented to the ER via EMS on 22 for evaluation of fever, cough, shortness of breath, and decreased responsiveness. The patient had a positive home covid test.    Inpatient testing confirmed covid-19 infection. She was also found to have aspiration pneumonia as well as covid-19 pneumonia.          Physical Exam:   Vitals were reviewed  Blood pressure 125/64, pulse 77, temperature 98.2  F (36.8  C), temperature source Oral, resp. rate 18, height 1.651 m (5' 5\"), weight 51.2 kg (112 lb 14 oz), SpO2 92 %.  Temperatures:  Current - Temp: 98.2  F (36.8  C); Max - Temp  Av.3  F (36.8  C)  Min: 98.1  F (36.7  C)  Max: 98.6  F (37  C)  Respiration range: Resp  Av.8  Min: 16  Max: 18  Pulse range: Pulse  Av  Min: 69  Max: 92  Blood pressure range: Systolic (24hrs), Av , Min:116 , Max:127   ; Diastolic (24hrs), Av, Min:63, Max:81    Pulse oximetry range: SpO2  Av.5 %  Min: 92 %  Max: 99 %    Intake/Output Summary (Last 24 hours) at 2022 1217  Last data filed at 2022 0538  Gross per 24 hour   Intake 220 ml   Output --   Net 220 ml                     " "Past Medical History:   I have reviewed this patient's past medical history  History reviewed. No pertinent past medical history.          Past Surgical History:   I have reviewed this patient's past surgical history  Past Surgical History:   Procedure Laterality Date     EYE SURGERY                 Social History:   I have reviewed this patient's social history  Social History     Tobacco Use     Smoking status: Never Smoker     Smokeless tobacco: Never Used   Substance Use Topics     Alcohol use: Yes     Comment: Alcoholic Drinks/day: a few glasses of wine a month             Family History:   I have reviewed this patient's family history  History reviewed. No pertinent family history.          Allergies:     Allergies   Allergen Reactions     Codeine Anaphylaxis     Chest pressure, shortness of breath     External Allergen Needs Reconciliation - See Comment Unknown     UNABLE TO ASSESS, States \"all opiates.\"             Medications:   I have reviewed this patient's current medications  Medications Prior to Admission   Medication Sig Dispense Refill Last Dose     acetaminophen (TYLENOL) 325 MG tablet Take 650 mg by mouth every 8 hours as needed for fever or pain    Unknown     aspirin 81 mg chewable tablet Take 81 mg by mouth daily    1/17/2022 at am     brimonidine (ALPHAGAN P) 0.1 % ophthalmic solution Place 1 drop into the right eye 2 times daily    1/17/2022 at am     cholecalciferol 25 MCG (1000 UT) TABS Take 1,000 Units by mouth daily   1/17/2022 at am     dorzolamide-timolol (COSOPT) 2-0.5 % ophthalmic solution Place 1 drop into the right eye 2 times daily   1/17/2022 at am     gabapentin (NEURONTIN) 300 MG capsule Take 300 mg by mouth every evening    1/16/2022 at pm     levothyroxine (SYNTHROID, LEVOTHROID) 100 MCG tablet Take 100 mcg by mouth daily before breakfast    1/17/2022 at am     loperamide (IMODIUM) 2 MG capsule Take 2 mg by mouth 4 times daily as needed for diarrhea    Unknown     MELALEUCA SC " Take by mouth daily Melaleuca vitamin supplements containing - Multivitamin, minerals, calcium   1/17/2022 at am     netarsudil (RHOPRESSA) 0.02 % ophthalmic solution Place 1 drop into the right eye At Bedtime   1/16/2022 at pm     ondansetron (ZOFRAN) 8 MG tablet Take 8 mg by mouth every 8 hours as needed for nausea   1/17/2022 at am     oxyCODONE (OXYCONTIN) 30 MG 12 hr tablet Take 30 mg by mouth every 12 hours   1/17/2022 at 0800     polyethylene glycol (MIRALAX) 17 GM/Dose powder Take 17 g (1 capful) by mouth daily 527 g 0 1/17/2022 at am     potassium chloride SA (K-DUR,KLOR-CON) 10 MEQ tablet Take 10 mEq by mouth two times daily With food   1/17/2022 at am     senna (SENOKOT) 8.6 MG tablet Take 1-2 tablets by mouth 2 times daily One tablet in the morning and two tablets in the evening   1/17/2022 at am     tafluprost, PF, (ZIOPTAN, PF,) 0.0015 % Dpet ophthalmic dropperette Place 1 drop into the right eye At Bedtime    1/16/2022 at pm     vitamin E 400 unit capsule [VITAMIN E 400 UNIT CAPSULE] Take 400 Units by mouth daily.   1/17/2022 at am     Current Facility-Administered Medications Ordered in Epic   Medication Dose Route Frequency Last Rate Last Admin     remdesivir 100 mg in sodium chloride 0.9 % 250 mL intermittent infusion  100 mg Intravenous Q24H 125 mL/hr at 01/18/22 1736 100 mg at 01/18/22 1736    And     0.9% sodium chloride BOLUS  50 mL Intravenous Q24H 50 mL/hr at 01/18/22 1750 50 mL at 01/18/22 1750     acetaminophen (TYLENOL) Suppository 650 mg  650 mg Rectal Q6H PRN         acetaminophen (TYLENOL) tablet 650 mg  650 mg Oral Q8H PRN         albuterol (PROVENTIL) neb solution 2.5 mg  3 mL Nebulization Q2H PRN         ampicillin-sulbactam (UNASYN) 3 g vial to attach to  mL bag  3 g Intravenous Q6H 0 mL/hr at 01/18/22 1752 3 g at 01/19/22 0510     aspirin (ASA) chewable tablet 81 mg  81 mg Oral Daily   81 mg at 01/19/22 0907     brimonidine (ALPHAGAN P) 0.1 % ophthalmic solution 1 drop  1  drop Right Eye BID   1 drop at 01/19/22 0909     carboxymethylcellulose PF (REFRESH PLUS) 0.5 % ophthalmic solution 1 drop  1 drop Both Eyes Q1H PRN         dexamethasone (DECADRON) injection 6 mg  6 mg Intravenous Daily   6 mg at 01/18/22 1301     dorzolamide-timolol (COSOPT) ophthalmic solution 1 drop  1 drop Right Eye BID   1 drop at 01/19/22 0911     enoxaparin ANTICOAGULANT (LOVENOX) injection 40 mg  40 mg Subcutaneous Q24H   40 mg at 01/19/22 0907     famotidine (PEPCID) injection 20 mg  20 mg Intravenous Q12H   20 mg at 01/18/22 2222     latanoprost (XALATAN) 0.005 % ophthalmic solution 1 drop  1 drop Right Eye At Bedtime   1 drop at 01/18/22 2219     levothyroxine (SYNTHROID/LEVOTHROID) tablet 100 mcg  100 mcg Oral Daily   100 mcg at 01/19/22 0510     lidocaine (LMX4) cream   Topical Q1H PRN         lidocaine 1 % 0.1-1 mL  0.1-1 mL Other Q1H PRN         Medication instructions: Do NOT use nebulized medications   Does not apply Continuous PRN         morphine (PF) injection 2 mg  2 mg Intravenous Q2H PRN   2 mg at 01/18/22 0333     morphine sulfate (ROXANOL) 20 mg/mL (HIGH CONC) soln 10 mg  10 mg Sublingual Q6H   10 mg at 01/19/22 0600     naloxone (NARCAN) injection 0.2 mg  0.2 mg Intravenous Q2 Min PRN        Or     naloxone (NARCAN) injection 0.4 mg  0.4 mg Intravenous Q2 Min PRN        Or     naloxone (NARCAN) injection 0.2 mg  0.2 mg Intramuscular Q2 Min PRN        Or     naloxone (NARCAN) injection 0.4 mg  0.4 mg Intramuscular Q2 Min PRN         netarsudil (RHOPRESSA) 0.02 % ophthalmic solution 1 drop  1 drop Right Eye At Bedtime   1 drop at 01/18/22 2222     ondansetron (ZOFRAN-ODT) ODT tab 4 mg  4 mg Oral Q6H PRN        Or     ondansetron (ZOFRAN) injection 4 mg  4 mg Intravenous Q6H PRN   4 mg at 01/18/22 0331     potassium & sodium phosphates (NEUTRA-PHOS) Packet 1 packet  1 packet Oral TID   1 packet at 01/19/22 0907     prochlorperazine (COMPAZINE) injection 5 mg  5 mg Intravenous Q6H PRN         Or     prochlorperazine (COMPAZINE) tablet 5 mg  5 mg Oral Q6H PRN        Or     prochlorperazine (COMPAZINE) suppository 12.5 mg  12.5 mg Rectal Q12H PRN         sodium chloride (PF) 0.9% PF flush 3 mL  3 mL Intracatheter Q8H   3 mL at 01/19/22 0601     sodium chloride (PF) 0.9% PF flush 3 mL  3 mL Intracatheter q1 min prn   3 mL at 01/18/22 1129     No current [facility]-ordered outpatient medications on file.             Data:   Data   Results for orders placed or performed during the hospital encounter of 01/17/22 (from the past 24 hour(s))   Potassium   Result Value Ref Range    Potassium 3.6 3.5 - 5.0 mmol/L   Basic metabolic panel   Result Value Ref Range    Sodium 133 (L) 136 - 145 mmol/L    Potassium 3.8 3.5 - 5.0 mmol/L    Chloride 102 98 - 107 mmol/L    Carbon Dioxide (CO2) 22 22 - 31 mmol/L    Anion Gap 9 5 - 18 mmol/L    Urea Nitrogen 22 8 - 28 mg/dL    Creatinine 0.66 0.60 - 1.10 mg/dL    Calcium 7.8 (L) 8.5 - 10.5 mg/dL    Glucose 110 70 - 125 mg/dL    GFR Estimate 88 >60 mL/min/1.73m2   CRP inflammation   Result Value Ref Range    CRP 15.8 (H) 0.0-<0.8 mg/dL   Magnesium   Result Value Ref Range    Magnesium 2.0 1.8 - 2.6 mg/dL   Hepatic panel   Result Value Ref Range    Bilirubin Total 0.5 0.0 - 1.0 mg/dL    Bilirubin Direct 0.2 <=0.5 mg/dL    Protein Total 7.6 6.0 - 8.0 g/dL    Albumin 2.6 (L) 3.5 - 5.0 g/dL    Alkaline Phosphatase 65 45 - 120 U/L    AST 22 0 - 40 U/L    ALT 24 0 - 45 U/L   Magnesium   Result Value Ref Range    Magnesium 2.1 1.8 - 2.6 mg/dL   CBC with platelets   Result Value Ref Range    WBC Count 6.8 4.0 - 11.0 10e3/uL    RBC Count 4.06 3.80 - 5.20 10e6/uL    Hemoglobin 13.3 11.7 - 15.7 g/dL    Hematocrit 41.0 35.0 - 47.0 %     (H) 78 - 100 fL    MCH 32.8 26.5 - 33.0 pg    MCHC 32.4 31.5 - 36.5 g/dL    RDW 14.6 10.0 - 15.0 %    Platelet Count 187 150 - 450 10e3/uL   D dimer quantitative   Result Value Ref Range    D-Dimer Quantitative 1.43 (H) 0.00 - 0.50 ug/mL FEU    Narrative     This D-dimer assay is intended for use in conjunction with a clinical pretest probability assessment model to exclude pulmonary embolism (PE) and deep venous thrombosis (DVT) in outpatients suspected of PE or DVT. The cut-off value is 0.50 ug/mL FEU.   Phosphorus   Result Value Ref Range    Phosphorus 2.3 (L) 2.5 - 4.5 mg/dL   Social Work/ Care Management IP Consult    Narrative    Deena Peña RN     1/19/2022  9:55 AM  Care Management Initial Consult    General Information  Assessment completed with: Patient, patient   Type of CM/SW Visit: Initial Assessment    Primary Care Provider verified and updated as needed: Yes   Readmission within the last 30 days: no previous admission in   last 30 days      Reason for Consult: discharge planning  Advance Care Planning: Advance Care Planning Reviewed: present on   chart,verified with patient          Communication Assessment  Patient's communication style: spoken language (English or   Bilingual)    Hearing Difficulty or Deaf: no   Wear Glasses or Blind: yes (legally blind)    Cognitive  Cognitive/Neuro/Behavioral: WDL  Level of Consciousness:   lethargic,confused  Arousal Level: opens eyes spontaneously    Orientation: oriented x 4  Mood/Behavior: calm  Best Language: 0   - No aphasia  Speech: slow,clear    Living Environment:   People in home: sibling(s)  Kate  Current living Arrangements: house      Able to return to prior arrangements: yes       Family/Social Support:  Care provided by: self,other (see comments) (sister helps as   needed )  Provides care for: no one  Marital Status:   Sibling(s),Children          Description of Support System: Supportive    Support Assessment: Adequate family and caregiver support    Current Resources:   Patient receiving home care services: No     Community Resources: None  Equipment currently used at home: cane, straight,shower   chair,raised toilet seat,commode chair  Supplies currently used at home:  None    Employment/Financial:  Employment Status:          Financial Concerns: No concerns identified           Lifestyle & Psychosocial Needs:  Social Determinants of Health     Tobacco Use: Low Risk      Smoking Tobacco Use: Never Smoker     Smokeless Tobacco Use: Never Used   Alcohol Use: Not on file   Financial Resource Strain: Not on file   Food Insecurity: Not on file   Transportation Needs: Not on file   Physical Activity: Not on file   Stress: Not on file   Social Connections: Not on file   Intimate Partner Violence: Not on file   Depression: Not on file   Housing Stability: Not on file       Functional Status:  Prior to admission patient needed assistance:   Dependent ADLs:: Dressing (ssiter helps pick out clothes d/t   blindness )  Dependent IADLs:: Cooking       Mental Health Status:          Chemical Dependency Status:                Values/Beliefs:  Spiritual, Cultural Beliefs, Anabaptism Practices, Values that   affect care:                 Additional Information:  Spoke to patient via telephone d/t covid. Patient says she lives   with her twin sister in a house. Sisters daughter Adrianna lives   next door.  Has her own living space and is mostly independent.   Sister helps as needed d/t patient being legally blind. Sister   does the driving. Patient anticipates returning home with sister.   Family will transport. Son lives in [place] and is supportive   also.     Deena Peña RN

## 2022-01-19 NOTE — PROGRESS NOTES
Hospitalist Progress Note    Assessment/Plan  Amelia Fry is a 81 year old female admitted on 1/17/2022. She was brought to the emergency department from home for evaluation of shortness of breath, cough, positive COVID home test and some confusion.     # Confirmed COVID-19 infection    # Acute Hypoxic Respiratory Failure secondary to COVID-19 infection  # Viral Pneumonia secondary to COVID-19 infection     Symptom Onset January 17, 2022   Date of 1st Positive Test January 17, 2022   Vaccination Status Fully Vaccinated         - COVID-19 special precautions, continuous pulse-ox  - Oxygen: continue to monitor on room air (improved from 4 LPM to RA); titrate to keep SpO2 between 90-96%  - Labs: Standard COVID admission labs ordered (CBC with diff, CMP, INR, D-dimer, CRP), notable for elevated CRP 10.7 ->16.7 ->15.8  - Imaging: no additional imaging needed at this time  - Breathing treatments: albuterol inhaler four times a day scheduled and PRN; avoid nebulizers in favor of MDIs   - IV fluids:not indicated at this time  - Antibiotics: indicated due to concern of bacterial superinfection; procalcitonin elevated 1.52, Unasyn ordered   - COVID-Focused Medications: Dexamethasone 6 mg x 10 days or until hospital discharge, started on January 17, 2022 and Remdesivir x 5 days or until hospital discharge, started on January 17, 2022  - DVT Prophylaxis: at high risk of thrombotic complications due to COVID-19 (DDimer = 2.16 ug/mL FEU (Ref range: 0.00 - 0.50 ug/mL FEU) ).          - PROPHYLACTIC dosing: lovenox 40mg daily        - consider anticoag on discharge for 30 days & until return to normal mobility     #Aspiration pneumonia, RLL infiltrate seen on the CT, elevated procalcitonin 1.52  VFSS evaluation 1/18: Penetration with thin liquids.  Will order easy to chew diet and thin liquids per speech therapy recommendations  Esophagram shows small sliding hiatal hernia and Schatzki's ring.  Patient reports that she already  has appointment with [facility] February 16 - as she is having recurrent symptoms here, will ask GI recommendations   Continue IV antibiotics      #Acute metabolic encephalopathy - seems to be resolved   Likely related to infection    #Hypokalemia, hypomagnesemia -replacements ordered     #Multiple pathologic compression fractures, chronic back pain  Likely secondary to multiple myeloma  Opiate dependence, drug-induced polyneuropathy  Hold gabapentin until encephalopathy improves and when able to take by mouth  Appreciate palliative care input     #Hyponatremia - improved    and congested lung sounds likely secondary to aspiration pneumonia, less likely from volume overload  Urine sodium is high 143 more consistent with SIADH  Hold off on further IV fluids     #Hypothyroidism  Resume oral levothyroxine     #Glaucoma, legal blindness of left eye  Crusty exudates from right eye but no injected conjunctiva likely related to multiple eyedrops  Resume PTA medications       Barriers to Discharge: IV antibiotics, GI eval     Anticipated discharge date/Disposition: TBD pending PT/OT evaluation    Subjective  Patient reports no shortness of breath.  She is on room air today.  She reports having recurrent events when she feels discomfort in her esophagus while eating.      Objective    Vital signs in last 24 hours  Temp:  [98.1  F (36.7  C)-98.6  F (37  C)] 98.2  F (36.8  C)  Pulse:  [69-92] 77  Resp:  [16-18] 18  BP: (116-125)/(63-75) 125/64  SpO2:  [92 %-96 %] 92 % @LASTSAO2(12)@ O2 Device: None (Room air)    Weight:   Wt Readings from Last 3 Encounters:   01/18/22 51.2 kg (112 lb 14 oz)   01/12/22 51.7 kg (114 lb)   09/02/21 54.4 kg (120 lb)      Weight change:     Intake/Output last 3 shifts  I/O last 3 completed shifts:  In: 849.17 [P.O.:120; I.V.:529.17; IV Piggyback:200]  Out: -   Body mass index is 18.78 kg/m .    Physical Exam    General Appearance:    Alert, cooperative, no distress, appears stated age   Lungs:     Breathing is unlabored   Cardiovascular:    Regular rate ands rhythm.  Nornal S1, S2.  No murmur, rub or gallop.  No edema   Abdomen:     Soft, non-tender, bowel sounds active all four quadrants,     no masses, no organomegaly   Neurologic:   Awake, alert,  Blind      Pertinent Labs   Lab Results: personally reviewed.   Recent Labs   Lab 01/19/22  0605 01/18/22  0532 01/17/22  1713   * 132* 129*   CO2 22 20* 26   BUN 22 11 12   ALBUMIN 2.6*  --  3.1*   ALKPHOS 65  --  89   ALT 24  --  31   AST 22  --  25     Recent Labs   Lab 01/19/22  0635 01/18/22  0532 01/17/22  1713   WBC 6.8 5.0 4.1   HGB 13.3 12.4 12.8   HCT 41.0 37.5 38.5    170 170     Recent Labs   Lab 01/17/22  1713   TROPONINI 0.02     Invalid input(s): POCGLUFGR    Medications  Current Facility-Administered Medications   Medication     remdesivir 100 mg in sodium chloride 0.9 % 250 mL intermittent infusion    And     0.9% sodium chloride BOLUS     acetaminophen (TYLENOL) Suppository 650 mg     acetaminophen (TYLENOL) tablet 650 mg     albuterol (PROVENTIL) neb solution 2.5 mg     ampicillin-sulbactam (UNASYN) 3 g vial to attach to  mL bag     aspirin (ASA) chewable tablet 81 mg     brimonidine (ALPHAGAN P) 0.1 % ophthalmic solution 1 drop     carboxymethylcellulose PF (REFRESH PLUS) 0.5 % ophthalmic solution 1 drop     dexamethasone (DECADRON) injection 6 mg     dorzolamide-timolol (COSOPT) ophthalmic solution 1 drop     enoxaparin ANTICOAGULANT (LOVENOX) injection 40 mg     famotidine (PEPCID) tablet 20 mg     latanoprost (XALATAN) 0.005 % ophthalmic solution 1 drop     levothyroxine (SYNTHROID/LEVOTHROID) tablet 100 mcg     lidocaine (LMX4) cream     lidocaine 1 % 0.1-1 mL     Medication instructions: Do NOT use nebulized medications     morphine sulfate (ROXANOL) 20 mg/mL (HIGH CONC) soln 10 mg     morphine sulfate (ROXANOL) 20 mg/mL (HIGH CONC) soln 5 mg     naloxone (NARCAN) injection 0.2 mg    Or     naloxone (NARCAN) injection  0.4 mg    Or     naloxone (NARCAN) injection 0.2 mg    Or     naloxone (NARCAN) injection 0.4 mg     netarsudil (RHOPRESSA) 0.02 % ophthalmic solution 1 drop     ondansetron (ZOFRAN-ODT) ODT tab 4 mg    Or     ondansetron (ZOFRAN) injection 4 mg     potassium & sodium phosphates (NEUTRA-PHOS) Packet 1 packet     prochlorperazine (COMPAZINE) injection 5 mg    Or     prochlorperazine (COMPAZINE) tablet 5 mg    Or     prochlorperazine (COMPAZINE) suppository 12.5 mg     sennosides (SENOKOT) syrup 5 mL     sennosides (SENOKOT) syrup 5 mL     sodium chloride (PF) 0.9% PF flush 3 mL     sodium chloride (PF) 0.9% PF flush 3 mL       Pertinent Radiology   Radiology Results: Personally reviewed   Results for orders placed or performed during the hospital encounter of 01/17/22   CT Chest Pulmonary Embolism w Contrast    Impression    IMPRESSION:    1.  Motion artifact.    2.  No definitive pulmonary embolism.    3.  Relatively extensive right lower lobe endobronchial contents raising concern for aspiration. A few infectious/ inflammatory appearing nodular pulmonary opacities are also identified.    4.  Moderate airway thickening suggesting bronchitis. Mild bronchiectasis. Interval circumferential nonspecific tracheal wall thickening. Consider pulmonary consultation.    5.  Suspicious osseous findings are present, to include diffuse mottled appearance of the vertebral bodies (which is exaggerated by severe bony demineralization), however, other lucent rib lesions are identified, as well as an expansile anterior right   fourth rib lesion. Findings suggest neoplasm or metastatic disease (to include myelomatous disease).    6.  Multilevel vertebral compression fractures, some new since older imaging (though ultimately age-indeterminate).    7.  Please see report for complete detail.       XR Video Swallow with SLP or OT    Impression    IMPRESSION:  1.  Penetration without aspiration.     XR Esophagram    Impression     IMPRESSION:  1.  Small sliding hiatal hernia with a Schatzki's ring. A 13 mm barium tablet was impeded by the Schatzki's ring suggesting possible clinically significant stenosis.         Advanced Care Planning:  Discharge Planning discussed with patient and nursing staff. Updated son Nir over the phone       Tana Boo MD  Internal Medicine Hospitalist  1/18/2022

## 2022-01-19 NOTE — PLAN OF CARE
Pt alert, oriented. States minimal pain 1/10. Receiving scheduled liquid morphine. LS diminished. Cough productive, thick mucus. On RA. Per Speech therapy meds are to be crushed and put in apple sauce of pudding. Received IV antibiotic.

## 2022-01-19 NOTE — CONSULTS
Care Management Initial Consult    General Information  Assessment completed with: Patient, patient   Type of CM/SW Visit: Initial Assessment    Primary Care Provider verified and updated as needed: Yes   Readmission within the last 30 days: no previous admission in last 30 days      Reason for Consult: discharge planning  Advance Care Planning: Advance Care Planning Reviewed: present on chart,verified with patient          Communication Assessment  Patient's communication style: spoken language (English or Bilingual)    Hearing Difficulty or Deaf: no   Wear Glasses or Blind: yes (legally blind)    Cognitive  Cognitive/Neuro/Behavioral: WDL  Level of Consciousness: lethargic,confused  Arousal Level: opens eyes spontaneously  Orientation: oriented x 4  Mood/Behavior: calm  Best Language: 0 - No aphasia  Speech: slow,clear    Living Environment:   People in home: sibling(s)  Kate  Current living Arrangements: house      Able to return to prior arrangements: yes       Family/Social Support:  Care provided by: self,other (see comments) (sister helps as needed )  Provides care for: no one  Marital Status:   Sibling(s),Children          Description of Support System: Supportive    Support Assessment: Adequate family and caregiver support    Current Resources:   Patient receiving home care services: No     Community Resources: None  Equipment currently used at home: cane, straight,shower chair,raised toilet seat,commode chair  Supplies currently used at home: None    Employment/Financial:  Employment Status:          Financial Concerns: No concerns identified           Lifestyle & Psychosocial Needs:  Social Determinants of Health     Tobacco Use: Low Risk      Smoking Tobacco Use: Never Smoker     Smokeless Tobacco Use: Never Used   Alcohol Use: Not on file   Financial Resource Strain: Not on file   Food Insecurity: Not on file   Transportation Needs: Not on file   Physical Activity: Not on file   Stress: Not on  file   Social Connections: Not on file   Intimate Partner Violence: Not on file   Depression: Not on file   Housing Stability: Not on file       Functional Status:  Prior to admission patient needed assistance:   Dependent ADLs:: Dressing (ssiter helps pick out clothes d/t blindness )  Dependent IADLs:: Cooking       Mental Health Status:          Chemical Dependency Status:                Values/Beliefs:  Spiritual, Cultural Beliefs, Advent Practices, Values that affect care:                 Additional Information:  Spoke to patient via telephone d/t covid. Patient says she lives with her twin sister in a house. Sisters daughter Adrianna lives next door.  Has her own living space and is mostly independent. Sister helps as needed d/t patient being legally blind. Sister does the driving. Patient anticipates returning home with sister. Family will transport. Son lives in [place] and is supportive also.     Deena Peña RN

## 2022-01-19 NOTE — PLAN OF CARE
Problem: Adult Inpatient Plan of Care  Goal: Plan of Care Review  Outcome: Improving  Goal: Patient-Specific Goal (Individualized)  Outcome: Improving  Goal: Absence of Hospital-Acquired Illness or Injury  Outcome: Improving  Intervention: Identify and Manage Fall Risk  Recent Flowsheet Documentation  Taken 1/19/2022 0028 by Donya Escoto, RN  Safety Promotion/Fall Prevention: bed alarm on  Intervention: Prevent Skin Injury  Recent Flowsheet Documentation  Taken 1/19/2022 0028 by Donya Escoto RN  Body Position:   position changed independently   turned   left  Goal: Optimal Comfort and Wellbeing  Outcome: Improving  Goal: Readiness for Transition of Care  Outcome: Improving     Problem: Risk for Delirium  Goal: Optimal Coping  Outcome: Improving  Goal: Improved Behavioral Control  Outcome: Improving  Goal: Improved Attention and Thought Clarity  Outcome: Improving  Goal: Improved Sleep  Outcome: Improving     Pt a/o without c/o pain on scheduled morphine. Pt able to transfer to [facility] with assist of 1, some dizziness with position change (pt reports normal for her). O2 sats maintaining on room air. Will continue to monitor.

## 2022-01-20 ENCOUNTER — APPOINTMENT (OUTPATIENT)
Dept: OCCUPATIONAL THERAPY | Facility: HOSPITAL | Age: 82
DRG: 177 | End: 2022-01-20
Payer: COMMERCIAL

## 2022-01-20 ENCOUNTER — APPOINTMENT (OUTPATIENT)
Dept: PHYSICAL THERAPY | Facility: HOSPITAL | Age: 82
DRG: 177 | End: 2022-01-20
Attending: INTERNAL MEDICINE
Payer: COMMERCIAL

## 2022-01-20 VITALS
HEART RATE: 79 BPM | TEMPERATURE: 97.8 F | DIASTOLIC BLOOD PRESSURE: 74 MMHG | RESPIRATION RATE: 16 BRPM | WEIGHT: 109 LBS | SYSTOLIC BLOOD PRESSURE: 148 MMHG | OXYGEN SATURATION: 97 % | HEIGHT: 65 IN | BODY MASS INDEX: 18.16 KG/M2

## 2022-01-20 LAB
ANION GAP SERPL CALCULATED.3IONS-SCNC: 6 MMOL/L (ref 5–18)
BUN SERPL-MCNC: 21 MG/DL (ref 8–28)
C REACTIVE PROTEIN LHE: 6.8 MG/DL (ref 0–0.8)
CALCIUM SERPL-MCNC: 8 MG/DL (ref 8.5–10.5)
CHLORIDE BLD-SCNC: 105 MMOL/L (ref 98–107)
CO2 SERPL-SCNC: 24 MMOL/L (ref 22–31)
CREAT SERPL-MCNC: 0.58 MG/DL (ref 0.6–1.1)
D DIMER PPP FEU-MCNC: 1 UG/ML FEU (ref 0–0.5)
ERYTHROCYTE [DISTWIDTH] IN BLOOD BY AUTOMATED COUNT: 14.6 % (ref 10–15)
GFR SERPL CREATININE-BSD FRML MDRD: >90 ML/MIN/1.73M2
GLUCOSE BLD-MCNC: 103 MG/DL (ref 70–125)
HCT VFR BLD AUTO: 36.9 % (ref 35–47)
HGB BLD-MCNC: 12.3 G/DL (ref 11.7–15.7)
HOLD SPECIMEN: NORMAL
MAGNESIUM SERPL-MCNC: 1.8 MG/DL (ref 1.8–2.6)
MCH RBC QN AUTO: 33.5 PG (ref 26.5–33)
MCHC RBC AUTO-ENTMCNC: 33.3 G/DL (ref 31.5–36.5)
MCV RBC AUTO: 101 FL (ref 78–100)
PHOSPHATE SERPL-MCNC: 2.5 MG/DL (ref 2.5–4.5)
PLATELET # BLD AUTO: 175 10E3/UL (ref 150–450)
POTASSIUM BLD-SCNC: 3.5 MMOL/L (ref 3.5–5)
RBC # BLD AUTO: 3.67 10E6/UL (ref 3.8–5.2)
SODIUM SERPL-SCNC: 135 MMOL/L (ref 136–145)
WBC # BLD AUTO: 4.7 10E3/UL (ref 4–11)

## 2022-01-20 PROCEDURE — 250N000013 HC RX MED GY IP 250 OP 250 PS 637: Performed by: NURSE PRACTITIONER

## 2022-01-20 PROCEDURE — 99232 SBSQ HOSP IP/OBS MODERATE 35: CPT | Performed by: NURSE PRACTITIONER

## 2022-01-20 PROCEDURE — 250N000011 HC RX IP 250 OP 636: Performed by: HOSPITALIST

## 2022-01-20 PROCEDURE — 36415 COLL VENOUS BLD VENIPUNCTURE: CPT | Performed by: HOSPITALIST

## 2022-01-20 PROCEDURE — 97535 SELF CARE MNGMENT TRAINING: CPT | Mod: GO

## 2022-01-20 PROCEDURE — 84100 ASSAY OF PHOSPHORUS: CPT | Performed by: INTERNAL MEDICINE

## 2022-01-20 PROCEDURE — 250N000013 HC RX MED GY IP 250 OP 250 PS 637: Performed by: INTERNAL MEDICINE

## 2022-01-20 PROCEDURE — 97161 PT EVAL LOW COMPLEX 20 MIN: CPT | Mod: GP

## 2022-01-20 PROCEDURE — 83735 ASSAY OF MAGNESIUM: CPT | Performed by: INTERNAL MEDICINE

## 2022-01-20 PROCEDURE — 85027 COMPLETE CBC AUTOMATED: CPT | Performed by: HOSPITALIST

## 2022-01-20 PROCEDURE — 80048 BASIC METABOLIC PNL TOTAL CA: CPT | Performed by: HOSPITALIST

## 2022-01-20 PROCEDURE — 85379 FIBRIN DEGRADATION QUANT: CPT | Performed by: HOSPITALIST

## 2022-01-20 PROCEDURE — 86140 C-REACTIVE PROTEIN: CPT | Performed by: HOSPITALIST

## 2022-01-20 PROCEDURE — 97116 GAIT TRAINING THERAPY: CPT | Mod: GP

## 2022-01-20 PROCEDURE — 250N000011 HC RX IP 250 OP 636: Performed by: INTERNAL MEDICINE

## 2022-01-20 PROCEDURE — 99239 HOSP IP/OBS DSCHRG MGMT >30: CPT | Performed by: INTERNAL MEDICINE

## 2022-01-20 RX ORDER — MORPHINE SULFATE 20 MG/ML
10 SOLUTION ORAL EVERY 8 HOURS
Qty: 15 ML | Refills: 0 | Status: SHIPPED | OUTPATIENT
Start: 2022-01-20 | End: 2022-01-30

## 2022-01-20 RX ORDER — BISACODYL 10 MG
10 SUPPOSITORY, RECTAL RECTAL DAILY PRN
Status: DISCONTINUED | OUTPATIENT
Start: 2022-01-20 | End: 2022-01-20 | Stop reason: HOSPADM

## 2022-01-20 RX ORDER — GABAPENTIN 300 MG/1
300 CAPSULE ORAL EVERY EVENING
Status: ON HOLD
Start: 2022-01-20 | End: 2022-02-19

## 2022-01-20 RX ORDER — VITAMIN E 268 MG
400 CAPSULE ORAL DAILY
Start: 2022-01-20

## 2022-01-20 RX ORDER — POLYETHYLENE GLYCOL 3350 17 G/17G
17 POWDER, FOR SOLUTION ORAL DAILY
Status: DISCONTINUED | OUTPATIENT
Start: 2022-01-20 | End: 2022-01-20 | Stop reason: HOSPADM

## 2022-01-20 RX ORDER — MORPHINE SULFATE 20 MG/ML
5 SOLUTION ORAL EVERY 4 HOURS PRN
Qty: 15 ML | Refills: 0 | Status: SHIPPED | OUTPATIENT
Start: 2022-01-20 | End: 2022-02-17

## 2022-01-20 RX ADMIN — MORPHINE SULFATE 10 MG: 20 SOLUTION ORAL at 06:27

## 2022-01-20 RX ADMIN — AMPICILLIN SODIUM AND SULBACTAM SODIUM 3 G: 2; 1 INJECTION, POWDER, FOR SOLUTION INTRAMUSCULAR; INTRAVENOUS at 00:51

## 2022-01-20 RX ADMIN — POLYETHYLENE GLYCOL 3350 17 G: 17 POWDER, FOR SOLUTION ORAL at 14:10

## 2022-01-20 RX ADMIN — ENOXAPARIN SODIUM 40 MG: 40 INJECTION SUBCUTANEOUS at 09:15

## 2022-01-20 RX ADMIN — AMPICILLIN SODIUM AND SULBACTAM SODIUM 3 G: 2; 1 INJECTION, POWDER, FOR SOLUTION INTRAMUSCULAR; INTRAVENOUS at 06:27

## 2022-01-20 RX ADMIN — FAMOTIDINE 20 MG: 20 TABLET, FILM COATED ORAL at 09:15

## 2022-01-20 RX ADMIN — LEVOTHYROXINE SODIUM 100 MCG: 0.1 TABLET ORAL at 06:27

## 2022-01-20 RX ADMIN — SENNOSIDES 5 ML: 8.8 LIQUID ORAL at 14:09

## 2022-01-20 RX ADMIN — AMPICILLIN SODIUM AND SULBACTAM SODIUM 3 G: 2; 1 INJECTION, POWDER, FOR SOLUTION INTRAMUSCULAR; INTRAVENOUS at 12:10

## 2022-01-20 RX ADMIN — BRIMONIDINE TARTRATE 1 DROP: 1 SOLUTION/ DROPS OPHTHALMIC at 09:15

## 2022-01-20 RX ADMIN — DEXAMETHASONE SODIUM PHOSPHATE 6 MG: 4 INJECTION, SOLUTION INTRA-ARTICULAR; INTRALESIONAL; INTRAMUSCULAR; INTRAVENOUS; SOFT TISSUE at 12:11

## 2022-01-20 RX ADMIN — ASPIRIN 81 MG CHEWABLE TABLET 81 MG: 81 TABLET CHEWABLE at 09:15

## 2022-01-20 RX ADMIN — MORPHINE SULFATE 10 MG: 20 SOLUTION ORAL at 14:09

## 2022-01-20 ASSESSMENT — ACTIVITIES OF DAILY LIVING (ADL)
ADLS_ACUITY_SCORE: 17

## 2022-01-20 ASSESSMENT — MIFFLIN-ST. JEOR: SCORE: 965.3

## 2022-01-20 NOTE — PROGRESS NOTES
Went over all discharge paper work with family. Family aware of EGD follow up and meds to hold for now. Eye drops and belongings sent with Pt. Pt discharge home with family 1/20/22 at 1650

## 2022-01-20 NOTE — PROGRESS NOTES
[facility]  Palliative Care Daily Progress Note    Today, the patient was seen for:  Pain mgmt        Recommendations & Counseling     Symptom Management:  Pain  - well managed with SL morphine.  Given esophogram results, SLP recommends crushed meds.  Unable to crush PTA OxyContin.  Plan is for EGD with dilation as outpatient.      - Continue SL morphine 10mg q8h and 5mg q4h prn for breakthrough.   - Continue to hold Gabapentin for now given pain currently well controlled.  This could be restarted as a liquid if needed.     Bowels  - increase senna syrup to 5mL BID from daily  - start daily Miralax       Goals of Care:  - Restorative intent.  Continue current treatments/therapies   - Pain control - met   - Regular bowel movements   - She wants to continue her cancer treatments when able        Assessments          81 yoF with multiple myeloma, back pain, constipation, blindness admitted on 1/17/2022, positive with COVID, AMS, dyspnea.  Imaging shows likley aspiration pneumonia, and suspicious osseous findings suggestive of metastatic disease and new vertebral compression fractures.       1. Chronic pain, cancer related.  On Gabapentin 300mg HS and Oxycontin 30mg q12h PTA.  Started SL morphine given Esophagram results showing 13 mm pills sticking in lower esophagus.  Gabapentin held d/t AMS on admission.   2. Bowels: hx severe constipation.  BM today, patient notes this was small and hard.    3. Nausea: stable   4. Dysphagia with aspiration pneumonia on abx.  No aspiration on VFSS but small sliding hiatal hernia and Schatzki's ring on Esophagram.  Meds to be crushed.  GI consulted and possible plan for EGD   5. Covid + remains room air    Advanced Care Planning:   - HCD on file  - Health care agent: primary son Nir, alternate is sister Kate  - Code status: DNR/I      Psychosocial/Spiritual Support:  Lives with her sister Kate who is her primary caregiver  - Son Nir  -     Prognosis,  Goals, or Advance Care Planning was addressed today with: Yes.  Mood, coping, and/or meaning in the context of serious illness were addressed today: Yes.            Interval History:     Chart review/discussion with unit or clinical team members:   No overnight events.    Per patienty:  No pain  Concerned about her bowel regimen.  All questions answered  Discussed her pain mgmt.  She agrees to hold on restarting Gabapentin given her pain is currently well managed.     Per son Nir:  He is aware of current plan and clinical status.      Key Palliative Symptoms: (0=no symptom/no concern, 1=mild, 2=moderate, 3=severe):  Pain: 0  Dyspnea: 0  Fatigue: 0  Nausea: 0  Constipation: 0  Diarrhea: 0  Depressive symptoms: 0  Anxiety: 0  Drowsiness: 0  Poor Appetite: 0  Insomnia: 0    Patient is on opioids: assessed and I made recommendations about bowel care as above.           Review of Systems:     A full 14 point review of systems was otherwise completed and is negative aside from that mentionedabove          Medications:     I have reviewed this patient's medication profile and medications during this hospitalization.    Noted meds:    Current Facility-Administered Medications   Medication     remdesivir 100 mg in sodium chloride 0.9 % 250 mL intermittent infusion    And     0.9% sodium chloride BOLUS     acetaminophen (TYLENOL) Suppository 650 mg     acetaminophen (TYLENOL) tablet 650 mg     albuterol (PROVENTIL) neb solution 2.5 mg     ampicillin-sulbactam (UNASYN) 3 g vial to attach to  mL bag     aspirin (ASA) chewable tablet 81 mg     brimonidine (ALPHAGAN P) 0.1 % ophthalmic solution 1 drop     carboxymethylcellulose PF (REFRESH PLUS) 0.5 % ophthalmic solution 1 drop     dexamethasone (DECADRON) injection 6 mg     dorzolamide-timolol (COSOPT) ophthalmic solution 1 drop     enoxaparin ANTICOAGULANT (LOVENOX) injection 40 mg     famotidine (PEPCID) tablet 20 mg     latanoprost (XALATAN) 0.005 % ophthalmic  "solution 1 drop     levothyroxine (SYNTHROID/LEVOTHROID) tablet 100 mcg     lidocaine (LMX4) cream     lidocaine 1 % 0.1-1 mL     Medication instructions: Do NOT use nebulized medications     morphine sulfate (ROXANOL) 20 mg/mL (HIGH CONC) soln 10 mg     morphine sulfate (ROXANOL) 20 mg/mL (HIGH CONC) soln 5 mg     naloxone (NARCAN) injection 0.2 mg    Or     naloxone (NARCAN) injection 0.4 mg    Or     naloxone (NARCAN) injection 0.2 mg    Or     naloxone (NARCAN) injection 0.4 mg     netarsudil (RHOPRESSA) 0.02 % ophthalmic solution 1 drop     ondansetron (ZOFRAN-ODT) ODT tab 4 mg    Or     ondansetron (ZOFRAN) injection 4 mg     prochlorperazine (COMPAZINE) injection 5 mg    Or     prochlorperazine (COMPAZINE) tablet 5 mg    Or     prochlorperazine (COMPAZINE) suppository 12.5 mg     sennosides (SENOKOT) syrup 5 mL     sennosides (SENOKOT) syrup 5 mL     sodium chloride (PF) 0.9% PF flush 3 mL     sodium chloride (PF) 0.9% PF flush 3 mL                Physical Exam:   Vital Signs: Blood pressure 133/63, pulse 67, temperature 98.2  F (36.8  C), temperature source Oral, resp. rate 18, height 1.651 m (5' 5\"), weight 49.4 kg (109 lb), SpO2 97 %.   GENERAL: lying in bed in NAD    SKIN: Warm and dry   HEENT: Normocephalic, anicteric sclera, moist mucous membranes  LUNGS: Clear to auscultation anterolaterally; non-labored   CARDIAC: RRR, normal s1/s2, w/o m/r/g   ABDOMINAL: BS (+), soft, non distended, non tender  EXTREMITIES: No edema or cyanosis, pulses 2+ and symmetrical  NEUROLOGIC: Alert & oriented x4   PSYCH: calm, pleasant              Data Reviewed:     Reviewed recent pertinent imaging:   XR VIDEO SWALLOW 1/18/2022                                                                   IMPRESSION:  1.  Penetration without aspiration.  ----------  XR ESOPHAGRAM 1/18/2022                                                                   IMPRESSION:  1.  Small sliding hiatal hernia with a Schatzki's ring. A 13 mm barium " tablet was impeded by the Schatzki's ring suggesting possible clinically significant stenosis.    Reviewed recent labs:  Last Comprehensive Metabolic Panel:  Sodium   Date Value Ref Range Status   01/20/2022 135 (L) 136 - 145 mmol/L Final     Potassium   Date Value Ref Range Status   01/20/2022 3.5 3.5 - 5.0 mmol/L Final     Chloride   Date Value Ref Range Status   01/20/2022 105 98 - 107 mmol/L Final     Carbon Dioxide (CO2)   Date Value Ref Range Status   01/20/2022 24 22 - 31 mmol/L Final     Anion Gap   Date Value Ref Range Status   01/20/2022 6 5 - 18 mmol/L Final     Glucose   Date Value Ref Range Status   01/20/2022 103 70 - 125 mg/dL Final     Urea Nitrogen   Date Value Ref Range Status   01/20/2022 21 8 - 28 mg/dL Final     Creatinine   Date Value Ref Range Status   01/20/2022 0.58 (L) 0.60 - 1.10 mg/dL Final     GFR Estimate   Date Value Ref Range Status   01/20/2022 >90 >60 mL/min/1.73m2 Final     Comment:     Effective December 21, 2021 eGFRcr in adults is calculated using the 2021 CKD-EPI creatinine equation which includes age and gender (Leonel et al., NEJ, DOI: 10.1056/GMJYrh3953374)   09/11/2020 >60 >60 mL/min/1.73m2 Final     Calcium   Date Value Ref Range Status   01/20/2022 8.0 (L) 8.5 - 10.5 mg/dL Final     Bilirubin Total   Date Value Ref Range Status   01/19/2022 0.5 0.0 - 1.0 mg/dL Final     Alkaline Phosphatase   Date Value Ref Range Status   01/19/2022 65 45 - 120 U/L Final     ALT   Date Value Ref Range Status   01/19/2022 24 0 - 45 U/L Final     AST   Date Value Ref Range Status   01/19/2022 22 0 - 40 U/L Final     Lab Results   Component Value Date    WBC 4.7 01/20/2022     Lab Results   Component Value Date    RBC 3.67 01/20/2022     Lab Results   Component Value Date    HGB 12.3 01/20/2022     Lab Results   Component Value Date    HCT 36.9 01/20/2022     No components found for: MCT  Lab Results   Component Value Date     01/20/2022     Lab Results   Component Value Date    MCH  33.5 01/20/2022     Lab Results   Component Value Date    MCHC 33.3 01/20/2022     Lab Results   Component Value Date    RDW 14.6 01/20/2022     Lab Results   Component Value Date     01/20/2022       ====================================================  TT: I have personally spent a total of 25 minutes on the unit in review of medical record, consultation with the medical providers and assessment of patient today, with more than 50% of this time spent in counseling, coordination of care, and discussion with patient and son re: symptom management, risks and benefits of management options, and development of plan of care as noted above.  ====================================================    Chadwick Block CNP  [facility]  Palliative Medicine  Pager [phone]  Office: [phone]

## 2022-01-20 NOTE — CONSULTS
CONSULTING PHYSICIAN   Tana Boo MD     REASON FOR CONSULTATION   Dysphagia     CHIEF COMPLAINT   Amelia Fry came to the hospital for evaluation of COVID pneumonia and aspiration pneumonia     HISTORY OF PRESENT ILLNESS   Amelia Fry is a 81 year old female with multiple myeloma on chronic narcotics, constipation, blindness, HTN admitted with covid pneumonia    GI asked to consult for dysphagia    Patient notes mid sternal symptoms of dysphagia. This has been present for several weeks and occurs with pills and solids    She also notes constipation with BM every 2-3 days. She was recently started on senna and miralax    Esophagram with Schatzki ring at GE junction     PAST HISTORY   History reviewed. No pertinent past medical history.   Past Surgical History:   Procedure Laterality Date     EYE SURGERY          Family History Social History   History reviewed. No pertinent family history. Social History     Tobacco Use     Smoking status: Never Smoker     Smokeless tobacco: Never Used   Substance Use Topics     Alcohol use: Yes     Comment: Alcoholic Drinks/day: a few glasses of wine a month     Drug use: No          MEDICATIONS & ALLERGIES   Medications Prior to Admission   Medication Sig Dispense Refill Last Dose     acetaminophen (TYLENOL) 325 MG tablet Take 650 mg by mouth every 8 hours as needed for fever or pain    Unknown     aspirin 81 mg chewable tablet Take 81 mg by mouth daily    1/17/2022 at am     brimonidine (ALPHAGAN P) 0.1 % ophthalmic solution Place 1 drop into the right eye 2 times daily    1/17/2022 at am     cholecalciferol 25 MCG (1000 UT) TABS Take 1,000 Units by mouth daily   1/17/2022 at am     dorzolamide-timolol (COSOPT) 2-0.5 % ophthalmic solution Place 1 drop into the right eye 2 times daily   1/17/2022 at am     gabapentin (NEURONTIN) 300 MG capsule Take 300 mg by mouth every evening    1/16/2022 at pm     levothyroxine (SYNTHROID,  "LEVOTHROID) 100 MCG tablet Take 100 mcg by mouth daily before breakfast    1/17/2022 at am     loperamide (IMODIUM) 2 MG capsule Take 2 mg by mouth 4 times daily as needed for diarrhea    Unknown     MELALEUCA SC Take by mouth daily Melaleuca vitamin supplements containing - Multivitamin, minerals, calcium   1/17/2022 at am     netarsudil (RHOPRESSA) 0.02 % ophthalmic solution Place 1 drop into the right eye At Bedtime   1/16/2022 at pm     ondansetron (ZOFRAN) 8 MG tablet Take 8 mg by mouth every 8 hours as needed for nausea   1/17/2022 at am     oxyCODONE (OXYCONTIN) 30 MG 12 hr tablet Take 30 mg by mouth every 12 hours   1/17/2022 at 0800     polyethylene glycol (MIRALAX) 17 GM/Dose powder Take 17 g (1 capful) by mouth daily 527 g 0 1/17/2022 at am     potassium chloride SA (K-DUR,KLOR-CON) 10 MEQ tablet Take 10 mEq by mouth two times daily With food   1/17/2022 at am     senna (SENOKOT) 8.6 MG tablet Take 1-2 tablets by mouth 2 times daily One tablet in the morning and two tablets in the evening   1/17/2022 at am     tafluprost, PF, (ZIOPTAN, PF,) 0.0015 % Dpet ophthalmic dropperette Place 1 drop into the right eye At Bedtime    1/16/2022 at pm     vitamin E 400 unit capsule [VITAMIN E 400 UNIT CAPSULE] Take 400 Units by mouth daily.   1/17/2022 at am        ALLERGIES   Allergies   Allergen Reactions     Codeine Anaphylaxis     Chest pressure, shortness of breath     External Allergen Needs Reconciliation - See Comment Unknown     UNABLE TO ASSESS, States \"all opiates.\"         REVIEW OF SYSTEMS   A comprehensive review of systems was performed and was otherwise noncontributory.     OBJECTIVE   Vitals Blood pressure 133/63, pulse 67, temperature 98.2  F (36.8  C), temperature source Oral, resp. rate 18, height 1.651 m (5' 5\"), weight 49.4 kg (109 lb), SpO2 97 %.           Physical  Exam   GENERAL: alert and oriented, well nourished in no apparent distress    SKIN: warm and dry, no rashes    HEENT: atraumatic, " anicteric, moist mucous membranes, neck soft/supple     PULMONARY: normal resp effort, breath sounds clear to auscultation bilateral    CARDIOVASCULAR: normal rate and rhythm, no murmurs, no edema    ABDOMEN: no tenderness, no distention, bowel sounds normal    MUSCULOSKELETAL: joints and gait normal    NEUROLOGICAL: appropriate mental status, grossly intact  DERM: no rash, no jaundice    PSYCHIATRIC: normal mood, affect and insight        LABORATORY    ELECTROLYTE PANEL   Recent Labs   Lab 01/20/22  0549 01/19/22  0605 01/18/22  2201 01/18/22  0532   * 133*  --  132*   POTASSIUM 3.5 3.8 3.6 3.2*   CHLORIDE 105 102  --  102   CO2 24 22  --  20*    110  --  130*   CR 0.58* 0.66  --  0.61   BUN 21 22  --  11      HEMATOLOGY PANEL   Recent Labs   Lab 01/20/22  0549 01/19/22  0635 01/18/22  0532   HGB 12.3 13.3 12.4   * 101* 100   WBC 4.7 6.8 5.0    187 170      LIVER AND PANCREAS PANEL   Recent Labs   Lab 01/19/22  0605 01/17/22  1713   AST 22 25   ALT 24 31   ALKPHOS 65 89   BILITOTAL 0.5 0.5     IMAGING STUDIES    EXAM: XR ESOPHAGRAM  LOCATION: [facility]  DATE/TIME: 1/18/2022 10:45 AM     INDICATION: acute metabolic encephalopathy, aspiration pneumonia  COMPARISON: None.  TECHNIQUE: Routine.     FINDINGS:  FLUOROSCOPIC TIME: 2.1 minutes  NUMBER OF IMAGES: No spot images. 7 saved fluoroscopic loops.     ESOPHAGUS: Small sliding hiatal hernia with Schatzki's ring. A 13 mm barium tablet was impeded by the Schatzki's ring and does not pass immediately into the stomach despite additional swallows of water. No other fixed strictures or masses. Esophageal   motility is normal for age. No gastroesophageal reflux was elicited. Incidentally noted are multiple thoracic compression fractures.                                                                      IMPRESSION:  1.  Small sliding hiatal hernia with a Schatzki's ring. A 13 mm barium tablet was impeded by the  Schatzki's ring suggesting possible clinically significant stenosis.    EXAM: CT CHEST PULMONARY EMBOLISM W CONTRAST  LOCATION: [facility]  DATE/TIME: 1/17/2022 7:17 PM     INDICATION: Shortness of breath.  COMPARISON: 09/20/2016.  TECHNIQUE: CT chest pulmonary angiogram during arterial phase injection of IV contrast. Multiplanar reformats and MIP reconstructions were performed. Dose reduction techniques were used.   CONTRAST: ISOVUE 370 100ML     FINDINGS:  ANGIOGRAM CHEST: Motion artifact. No obvious pulmonary embolism. Nonaneurysmal aorta without dissection. Mild atherosclerosis.     LUNGS AND PLEURA: Interval circumferential and diffuse tracheal wall thickening. Moderate airway thickening. Mild bronchiectasis. Lower lung predominant and relatively diffuse right lower lobe endobronchial contents. Few small lower lobe predominant   nodular opacities. Mild basilar atelectasis and volume loss. No pleural effusion or pneumothorax.     MEDIASTINUM/AXILLAE: No adenopathy. No pericardial effusion.     CORONARY ARTERY CALCIFICATION: Mild.     UPPER ABDOMEN: Unremarkable.     MUSCULOSKELETAL: Bony demineralization and degenerative changes.      Mottled appearance of the vertebral bodies. Multilevel mild to severe compression fractures are noted. New compression fractures at T2 (mild), T6 through T8 (mild-moderate) and L2 (mild superior endplate) since 2016. The severe T9, moderate T10 and mild   T12 compression fractures are without significant change. Marginally increased severe L1 compression fracture. Chronic sternal fracture also seen.     Interval expansile lesion along the anterior right fourth rib measuring 1.5 x 5 cm (series 400.2, image 122). Other regions of lucent change, to include the anterior right seventh rib (image 173).                                                                      IMPRESSION:     1.  Motion artifact.     2.  No definitive pulmonary embolism.     3.   Relatively extensive right lower lobe endobronchial contents raising concern for aspiration. A few infectious/ inflammatory appearing nodular pulmonary opacities are also identified.     4.  Moderate airway thickening suggesting bronchitis. Mild bronchiectasis. Interval circumferential nonspecific tracheal wall thickening. Consider pulmonary consultation.     5.  Suspicious osseous findings are present, to include diffuse mottled appearance of the vertebral bodies (which is exaggerated by severe bony demineralization), however, other lucent rib lesions are identified, as well as an expansile anterior right   fourth rib lesion. Findings suggest neoplasm or metastatic disease (to include myelomatous disease).     6.  Multilevel vertebral compression fractures, some new since older imaging (though ultimately age-indeterminate).     7.  Please see report for complete detail.    I have reviewed the current diagnostic and laboratory tests.           IMPRESSION   Amelia Fry is a 81 year old female with multiple myeloma on chronic narcotics, admitted with COVID pneumonia     Dysphagia- esophagram shows Schatzki ring. Will benefit from endoscopy with dilation.     Initially planned this inpatient but able to find outpatient opening on 01/28/2022 at [facility]. Will defer to this time to avoid aerosolizing procedure with active COVID   Continue PPI     Constipation- secondary to chronic narcotics and possibly myeloma mass effect. Patient declines change to laxative regimen at this time.   May hae benefit with Relistor or Movantik with opioid induced constipation              Kamaljit Preciado MD  Thank you for the opportunity to participate in the care of this patient.   Please feel free to call me with any questions or concerns.  Phone number [phone].

## 2022-01-20 NOTE — CONSULTS
"CLINICAL NUTRITION SERVICES - ASSESSMENT NOTE     Nutrition Prescription    RECOMMENDATIONS FOR MDs/PROVIDERS TO ORDER:  Consider chewable MVI d/t malnutrition    Malnutrition Status:    Severe    Recommendations already ordered by Registered Dietitian (RD):  None as pt discharging today    Future/Additional Recommendations:       REASON FOR ASSESSMENT  Amelia Fry is a/an 81 year old female assessed by the dietitian for consult for malnutrition    NUTRITION HISTORY  Spoke with pt by phone.  Pt losing wt over the past 4 months and po intake decreased d/t Schatzki's ring.  Pt eats small meals of soft food at home.  She drinks one Boost VHC daily.  Meals are soup, yogurt, hot cereal, other soft foods.  Pt noticed that ice and ice water aggravate her esophagus.    CURRENT NUTRITION ORDERS  Diet: Regular  Intake/Tolerance: Pt working on eating 1/2 of egg salad sandwich.      LABS  Labs reviewed    MEDICATIONS  Medications reviewed    ANTHROPOMETRICS  Height: 165.1 cm (5' 5\")  Most Recent Weight: 49.4 kg (109 lb)      BMI: Underweight BMI <18.5  Weight History:   Wt Readings from Last 8 Encounters:   01/20/22 49.4 kg (109 lb)   01/12/22 51.7 kg (114 lb)   09/02/21 54.4 kg (120 lb)   08/11/15 51.7 kg (114 lb)   06/11/15 51.3 kg (113 lb)   120 lb 9/2/21, 131 lb 9/11/20  9% loss in 4 months  Dosing Weight: 49.4 kg    ASSESSED NUTRITION NEEDS  Estimated Energy Needs: 1480+ kcals/day (30+)  Justification: Increased needs  Estimated Protein Needs: 49+ grams protein/day (1+)  Justification: Increased needs  Estimated Fluid Needs: 1480+ mL/day (1 mL/kcal)   Justification: Maintenance    PHYSICAL FINDINGS  See malnutrition section below.         MALNUTRITION:  % Weight Loss:  > 7.5% in 3 months (severe malnutrition)  % Intake:  </= 75% for >/= 1 month (severe malnutrition)  Subcutaneous Fat Loss:  unable  Muscle Loss:  unable  Fluid Retention:  None noted per chart    Malnutrition Diagnosis: Severe malnutrition  In Context " of:  Acute and Chronic illness or disease    NUTRITION DIAGNOSIS  Malnutrition related to acute and chronic illness as evidenced by wt loss, decreased intake      INTERVENTIONS  Implementation  Nutrition Education: No education needs assessed at this time   Pt to continue include protein with each meal     Goals  Patient to consume % of nutritionally adequate meals three times per day, or the equivalent with supplements/snacks.     Monitoring/Evaluation  Progress toward goals will be monitored and evaluated per protocol.

## 2022-01-20 NOTE — DISCHARGE SUMMARY
[facility] MEDICINE  DISCHARGE SUMMARY     Primary Care Physician: Roland Henriquez  Admission Date: 1/17/2022   Discharge Provider: Tana Boo MD Discharge Date: 1/20/2022   Diet:   Active Diet and Nourishment Order   Procedures     Combination Diet Regular Diet     Diet       Code Status: No CPR- Do NOT Intubate   Activity: DCACTIVITY: Activity as tolerated        Condition at Discharge: Stable     REASON FOR PRESENTATION(See Admission Note for Details)     Shortness of breath, cough, fever, confusion    PRINCIPAL & ACTIVE DISCHARGE DIAGNOSES     Principal Problem:    Aspiration pneumonia (H)  Active Problems:    Chronic back pain    Hypothyroidism, unspecified type    Drug-induced polyneuropathy (H)    Glaucoma    Pneumonia due to 2019 novel coronavirus    Acute respiratory failure with hypoxia (H)    Acute metabolic encephalopathy  Severe protein calorie malnutrition  Multiple myeloma  Immunocompromise    PENDING LABS     Unresulted Labs Ordered in the Past 30 Days of this Admission     Date and Time Order Name Status Description    1/17/2022  5:05 PM Blood Culture Peripheral Blood Preliminary     1/17/2022  5:05 PM Blood Culture Peripheral Blood Preliminary             PROCEDURES ( this hospitalization only)          RECOMMENDATIONS TO OUTPATIENT PROVIDER FOR F/U VISIT     Follow-up Appointments     Follow-up and recommended labs and tests       Follow up with primary care provider, Roland Henriquez, within 7   days for hospital follow- up.  No follow up labs or test are needed.    Follow up with Dr. Preciado , at [facility] on 1/28/22 for EGD (upper   endoscopy)             DISPOSITION     Home    SUMMARY OF HOSPITAL COURSE:      Amelia Fry is an 81-year-old female with past medical history of IgG multiple myeloma, most recently treated with daratumumab and Velcade, pathologic vertebral fractures, chronic pain, constipation, severe  malnutrition, hyperlipidemia, hypertension, hypothyroidism, skin cancer in remission, legally blind, fully vaccinated against COVID-19 who was brought in to [facility] for evaluation of fever, mild cough, confusion and decreased alertness.  Patient was hypoxic 86% on room air in the field.  She was found to have COVID-19 infection.  CT demonstrated extensive right lower lobe endobronchial contents concerning for an aspiration but no typical COVID-19 pneumonia findings.  Patient arrived on 6 L supplemental O2 and was requiring 4 L supplemental O2 initially but was quickly weaned off.  She was started on IV Unasyn, remdesivir and dexamethasone.  Suspect most of her symptoms are from aspiration and not COVID-19 infection nonetheless given severely immunocompromised status patient was recommended to isolate herself for 20 days since symptom onset.  She reported symptoms of dysphagia for couple of months prior to admission therefore esophagram was obtained and demonstrated Schatzki's ring at the GE junction.  GI was consulted, recommend outpatient EGD on 1/28/2022 to avoid aerosolizing procedure with active COVID.  Patient was recommended to crush pills in applesauce/use liquid form until the upper endoscopy.    Palliative care was consulted regarding goals of care discussion.  Patient indicated her wish for restorative cares at this time.    Patient was discharged home with family support.      Discharge Medications with Med changes:     Discharge Medication List as of 1/20/2022  3:34 PM      START taking these medications    Details   amoxicillin-clavulanate (AUGMENTIN) 875-125 MG tablet Take 1 tablet by mouth 2 times daily, Disp-8 tablet, R-0, E-Prescribe      !! morphine sulfate (ROXANOL) 20 mg/mL (HIGH CONC) soln Place 0.5 mLs (10 mg) under the tongue every 8 hours for 10 days, Disp-15 mL, R-0, E-Prescribe      !! morphine sulfate (ROXANOL) 20 mg/mL (HIGH CONC) soln Take 0.25 mLs (5 mg) by mouth every 4  hours as needed for moderate to severe pain, Disp-15 mL, R-0, E-Prescribe      sennosides (SENOKOT) 8.8 MG/5ML syrup Take 5 mLs by mouth 2 times daily, Disp-236 mL, R-0, E-Prescribe       !! - Potential duplicate medications found. Please discuss with provider.      CONTINUE these medications which have CHANGED    Details   cholecalciferol 25 MCG (1000 UT) TABS Take 1,000 Units by mouth daily HOLD UNTIL EGD IS DONE, No Print Out      gabapentin (NEURONTIN) 300 MG capsule Take 1 capsule (300 mg) by mouth every evening HOLD UNTIL EGD IS DONE, No Print Out      vitamin E (TOCOPHEROL) 400 units (180 mg) capsule Take 1 capsule (400 Units) by mouth daily HOLD UNTIL YOU HAVE EGD DONE, No Print Out         CONTINUE these medications which have NOT CHANGED    Details   acetaminophen (TYLENOL) 325 MG tablet Take 650 mg by mouth every 8 hours as needed for fever or pain , Historical      aspirin 81 mg chewable tablet Take 81 mg by mouth daily , Historical      brimonidine (ALPHAGAN P) 0.1 % ophthalmic solution Place 1 drop into the right eye 2 times daily , Historical      dorzolamide-timolol (COSOPT) 2-0.5 % ophthalmic solution Place 1 drop into the right eye 2 times daily, Historical      levothyroxine (SYNTHROID, LEVOTHROID) 100 MCG tablet Take 100 mcg by mouth daily before breakfast , Historical      loperamide (IMODIUM) 2 MG capsule Take 2 mg by mouth 4 times daily as needed for diarrhea , Historical      MELALEUCA SC Take by mouth daily Melaleuca vitamin supplements containing - Multivitamin, minerals, calcium, Historical      netarsudil (RHOPRESSA) 0.02 % ophthalmic solution Place 1 drop into the right eye At Bedtime, Historical      ondansetron (ZOFRAN) 8 MG tablet Take 8 mg by mouth every 8 hours as needed for nausea, Historical      polyethylene glycol (MIRALAX) 17 GM/Dose powder Take 17 g (1 capful) by mouth daily, Disp-527 g, R-0, Local Print      tafluprost, PF, (ZIOPTAN, PF,) 0.0015 % Dpet ophthalmic dropperette  Place 1 drop into the right eye At Bedtime , Historical         STOP taking these medications       oxyCODONE (OXYCONTIN) 30 MG 12 hr tablet Comments:   Reason for Stopping:         potassium chloride SA (K-DUR,KLOR-CON) 10 MEQ tablet Comments:   Reason for Stopping:         senna (SENOKOT) 8.6 MG tablet Comments:   Reason for Stopping:                     Rationale for medication changes:      Augmentin x4 days for aspiration pneumonia  Oxycodone was switched to sublingual morphine due to dysphagia        Consults       PALLIATIVE CARE ADULT IP CONSULT  SPEECH LANGUAGE PATH ADULT IP CONSULT  PALLIATIVE CARE ADULT IP CONSULT  SOCIAL WORK IP CONSULT  GASTROENTEROLOGY IP CONSULT  PHYSICAL THERAPY ADULT IP CONSULT  OCCUPATIONAL THERAPY ADULT IP CONSULT  NUTRITION SERVICES ADULT IP CONSULT    Immunizations given this encounter     Most Recent Immunizations   Administered Date(s) Administered     COVID-19,PF,Moderna 12/10/2021     FLUAD(HD)65+ QUAD 10/28/2021     Influenza (High Dose) 3 valent vaccine 09/21/2016           Anticoagulation Information      Recent INR results: No results for input(s): INR in the last 168 hours.  Warfarin doses (if applicable) or name of other anticoagulant: none      SIGNIFICANT IMAGING FINDINGS     Results for orders placed or performed during the hospital encounter of 01/17/22   CT Chest Pulmonary Embolism w Contrast    Impression    IMPRESSION:    1.  Motion artifact.    2.  No definitive pulmonary embolism.    3.  Relatively extensive right lower lobe endobronchial contents raising concern for aspiration. A few infectious/ inflammatory appearing nodular pulmonary opacities are also identified.    4.  Moderate airway thickening suggesting bronchitis. Mild bronchiectasis. Interval circumferential nonspecific tracheal wall thickening. Consider pulmonary consultation.    5.  Suspicious osseous findings are present, to include diffuse mottled appearance of the vertebral bodies (which is  exaggerated by severe bony demineralization), however, other lucent rib lesions are identified, as well as an expansile anterior right   fourth rib lesion. Findings suggest neoplasm or metastatic disease (to include myelomatous disease).    6.  Multilevel vertebral compression fractures, some new since older imaging (though ultimately age-indeterminate).    7.  Please see report for complete detail.       XR Video Swallow with SLP or OT    Impression    IMPRESSION:  1.  Penetration without aspiration.     XR Esophagram    Impression    IMPRESSION:  1.  Small sliding hiatal hernia with a Schatzki's ring. A 13 mm barium tablet was impeded by the Schatzki's ring suggesting possible clinically significant stenosis.       SIGNIFICANT LABORATORY FINDINGS     Most Recent 3 CBC's:Recent Labs   Lab Test 01/20/22  0549 01/19/22  0635 01/18/22  0532   WBC 4.7 6.8 5.0   HGB 12.3 13.3 12.4   * 101* 100    187 170     Most Recent 3 BMP's:Recent Labs   Lab Test 01/20/22  0549 01/19/22  0605 01/18/22  2201 01/18/22  0532   * 133*  --  132*   POTASSIUM 3.5 3.8 3.6 3.2*   CHLORIDE 105 102  --  102   CO2 24 22  --  20*   BUN 21 22  --  11   CR 0.58* 0.66  --  0.61   ANIONGAP 6 9  --  10   ACACIA 8.0* 7.8*  --  8.1*    110  --  130*     Most Recent ESR & CRP:Recent Labs   Lab Test 01/20/22  0549   CRP 6.8*           Discharge Orders        Reason for your hospital stay    Aspiration pneumonia, COVID 19 infection, dysphagia     Follow-up and recommended labs and tests     Follow up with primary care provider, Roland Henriquez, within 7 days for hospital follow- up.  No follow up labs or test are needed.    Follow up with Dr. Preciado , at [facility] on 1/28/22 for EGD (upper endoscopy)     Discharge - Quarantine/Isolation Instruction    Date of symptom onset: January 17, 2022   Date of first positive test: January 17, 2022  Stay home and away from others (self-isolate) for at least 20 days from when  your symptoms started And...   You've had no fevers and no medicine that reduces fever for 1 full day (24 hours)  And...   Your other symptoms have resolved (gotten better).     Activity    Your activity upon discharge: activity as tolerated     Contact provider    Contact your primary care provider if If increased trouble breathing, new arm/leg swelling, dizziness/passing out, falls, bleeding that doesn't stop, or uncontrolled pain.     Diet    Follow this diet upon discharge: Orders Placed This Encounter      Combination Diet Regular Diet      Crush pills in the apple sauce       Examination   Physical Exam   Temp:  [97.8  F (36.6  C)-98.6  F (37  C)] 97.8  F (36.6  C)  Pulse:  [67-79] 79  Resp:  [16-18] 16  BP: (133-148)/(63-74) 148/74  SpO2:  [97 %] 97 %  Wt Readings from Last 1 Encounters:   01/20/22 49.4 kg (109 lb)       General: No acute distress, malnourished and pale  CV: Regular rate and rhythm.  Normal S1-S2.  No murmur  Lungs: Clear  Abdomen: Soft and nontender  Extremities: No edema  Neuro: Awake and alert, grossly nonfocal, legally blind    Please see EMR for more detailed significant labs, imaging, consultant notes etc.    I, Tana Boo MD, personally saw the patient today and spent greater than 30 minutes discharging this patient.    Tana Boo MD  [facility]    CC:Roland Henriquez

## 2022-01-20 NOTE — PLAN OF CARE
Problem: Adult Inpatient Plan of Care  Goal: Plan of Care Review  Outcome: Improving  Goal: Patient-Specific Goal (Individualized)  Outcome: Improving  Goal: Absence of Hospital-Acquired Illness or Injury  Outcome: Improving  Intervention: Identify and Manage Fall Risk  Recent Flowsheet Documentation  Taken 1/20/2022 0051 by Donya Escoto, RN  Safety Promotion/Fall Prevention: bed alarm on  Intervention: Prevent Skin Injury  Recent Flowsheet Documentation  Taken 1/20/2022 0051 by Donya Escoto RN  Body Position: position changed independently  Goal: Optimal Comfort and Wellbeing  Outcome: Improving  Goal: Readiness for Transition of Care  Outcome: Improving     Problem: Risk for Delirium  Goal: Optimal Coping  Outcome: Improving  Goal: Improved Behavioral Control  Outcome: Improving  Goal: Improved Attention and Thought Clarity  Outcome: Improving  Goal: Improved Sleep  Outcome: Improving     Problem: OT General Care Plan  Goal: Transfer (OT)  Description: Transfer (OT)  Outcome: Improving  Goal: Toilet Transfer/Toileting (OT)  Description: Toilet Transfer/Toileting (OT)  Outcome: Improving   Uneventful shift. Pt denies pain and sleeping well. O2 sats maintaining on room air. Will continue to monitor.

## 2022-01-20 NOTE — PROGRESS NOTES
Care Management Discharge Note    Discharge Date: 01/20/2022       Discharge Disposition: Home    Discharge Services: None    Discharge DME: None    Discharge Transportation: family or friend will provide      Education Provided on the Discharge Plan:  yes  Persons Notified of Discharge Plans:  yes      Patient/Family in Agreement with the Plan: yes      Additional Information:  Patient to return home at discharge with support from sister. Family to transport.         Maureen Bush RN

## 2022-01-20 NOTE — PLAN OF CARE
Occupational Therapy Discharge Summary    Reason for therapy discharge:    All goals and outcomes met, no further needs identified.    Progress towards therapy goal(s). See goals on Care Plan in [facility] electronic health record for goal details.  Goals met    Therapy recommendation(s):    No further therapy is recommended.

## 2022-01-20 NOTE — PROGRESS NOTES
01/20/22 1300   Quick Adds   Type of Visit Initial PT Evaluation   Living Environment   People in home sibling(s)  (Sister)   Current Living Arrangements house   Home Accessibility stairs to enter home   Number of Stairs, Main Entrance 2   Stair Railings, Main Entrance other (see comments)  (Grab bar)   Self-Care   Equipment Currently Used at Home none   Activity/Exercise/Self-Care Comment Has FWW and cane at home   General Information   Onset of Illness/Injury or Date of Surgery 01/17/22   Referring Physician Dr. Tana Boo   Patient/Family Therapy Goals Statement (PT) Go Home   Pertinent History of Current Problem (include personal factors and/or comorbidities that impact the POC) COVID   Weight-Bearing Status - LLE full weight-bearing   Weight-Bearing Status - RLE full weight-bearing   General Observations Visual impairment   Cognition   Orientation Status (Cognition) oriented x 4   Bed Mobility   Bed Mobility supine-sit;sit-supine   Supine-Sit [place] (Bed Mobility) supervision   Sit-Supine [place] (Bed Mobility) supervision   Transfers   Transfers sit-stand transfer   Sit-Stand Transfer   Sit-Stand [place] (Transfers) supervision;verbal cues   Assistive Device (Sit-Stand Transfers)   (None)   Gait/Stairs (Locomotion)   [place] Level (Gait) verbal cues;supervision  (HHA due to visual impairment)   Assistive Device (Gait)   (None)   Distance in Feet (Required for LE Total Joints) 50   Pattern (Gait) step-through   Deviations/Abnormal Patterns (Gait) gait speed decreased   Maintains Weight-bearing Status (Gait) able to maintain   Clinical Impression   Criteria for Skilled Therapeutic Intervention yes, treatment indicated   PT Diagnosis (PT) Impaired functional mobility   Influenced by the following impairments weakness   Functional limitations due to impairments gait, stairs   Clinical Presentation Stable/Uncomplicated   Clinical Presentation Rationale Pt presents as medically  diagnosed   Clinical Decision Making (Complexity) low complexity   Therapy Frequency (PT) Daily   Predicted Duration of Therapy Intervention (days/wks) 3 days   Planned Therapy Interventions (PT) gait training;home exercise program;stair training;transfer training;strengthening   Anticipated Equipment Needs at Discharge (PT)   (None)   Risk & Benefits of therapy have been explained evaluation/treatment results reviewed;care plan/treatment goals reviewed;patient   PT Discharge Planning    PT Discharge Recommendation (DC Rec) home with home care physical therapy   PT Rationale for DC Rec Patient ambulating safely with assist for visual impairment, adequate balance and strength for ambulation. Has family support at home   Total Evaluation Time   Total Evaluation Time (Minutes) 10

## 2022-01-21 ENCOUNTER — PATIENT OUTREACH (OUTPATIENT)
Dept: CARE COORDINATION | Facility: CLINIC | Age: 82
End: 2022-01-21
Payer: COMMERCIAL

## 2022-01-21 DIAGNOSIS — Z71.89 OTHER SPECIFIED COUNSELING: ICD-10-CM

## 2022-01-21 NOTE — PLAN OF CARE
Physical Therapy Discharge Summary    Reason for therapy discharge:    Discharged to home. Home with assist. Discharge 1/20/2022.    Progress towards therapy goal(s). See goals on Care Plan in [facility] electronic health record for goal details.  Goals partially met.  Barriers to achieving goals:   discharge from facility.    Therapy recommendation(s):    No further therapy is recommended.

## 2022-01-21 NOTE — PROGRESS NOTES
Clinic Care Coordination Contact  Care Team Conversations    Patient identified for care management outreach, however [facility] RN staff has already followed up with patient to ensure they are following up with PCP and have needs and resources met. Clinic RN will refer back to ARLENE LEO if needed/appropriate.    LUCRECIA Loza  Social Work Care Coordinator - [facility]  Care Coordination  [email or web address]  Cell Phone: [phone]  Gender pronouns: she/her  Employed by [employer]

## 2022-01-23 LAB
BACTERIA BLD CULT: NO GROWTH
BACTERIA BLD CULT: NO GROWTH

## 2022-01-24 ENCOUNTER — HOSPITAL ENCOUNTER (OUTPATIENT)
Facility: CLINIC | Age: 82
End: 2022-01-24
Attending: INTERNAL MEDICINE | Admitting: INTERNAL MEDICINE
Payer: COMMERCIAL

## 2022-01-30 ENCOUNTER — HEALTH MAINTENANCE LETTER (OUTPATIENT)
Age: 82
End: 2022-01-30

## 2022-02-08 ENCOUNTER — LAB REQUISITION (OUTPATIENT)
Dept: LAB | Facility: CLINIC | Age: 82
End: 2022-02-08
Payer: COMMERCIAL

## 2022-02-08 DIAGNOSIS — I10 ESSENTIAL (PRIMARY) HYPERTENSION: ICD-10-CM

## 2022-02-08 PROCEDURE — 80053 COMPREHEN METABOLIC PANEL: CPT | Mod: ORL | Performed by: STUDENT IN AN ORGANIZED HEALTH CARE EDUCATION/TRAINING PROGRAM

## 2022-02-09 LAB
ALBUMIN SERPL-MCNC: 3 G/DL (ref 3.5–5)
ALP SERPL-CCNC: 117 U/L (ref 45–120)
ALT SERPL W P-5'-P-CCNC: 33 U/L (ref 0–45)
ANION GAP SERPL CALCULATED.3IONS-SCNC: 13 MMOL/L (ref 5–18)
AST SERPL W P-5'-P-CCNC: 37 U/L (ref 0–40)
BILIRUB SERPL-MCNC: 0.4 MG/DL (ref 0–1)
BUN SERPL-MCNC: 17 MG/DL (ref 8–28)
CALCIUM SERPL-MCNC: 9 MG/DL (ref 8.5–10.5)
CHLORIDE BLD-SCNC: 103 MMOL/L (ref 98–107)
CO2 SERPL-SCNC: 18 MMOL/L (ref 22–31)
CREAT SERPL-MCNC: 0.68 MG/DL (ref 0.6–1.1)
GFR SERPL CREATININE-BSD FRML MDRD: 88 ML/MIN/1.73M2
GLUCOSE BLD-MCNC: 114 MG/DL (ref 70–125)
POTASSIUM BLD-SCNC: 3.9 MMOL/L (ref 3.5–5)
PROT SERPL-MCNC: 8.6 G/DL (ref 6–8)
SODIUM SERPL-SCNC: 134 MMOL/L (ref 136–145)

## 2022-02-16 DIAGNOSIS — Z11.59 ENCOUNTER FOR SCREENING FOR OTHER VIRAL DISEASES: Primary | ICD-10-CM

## 2022-02-17 ENCOUNTER — APPOINTMENT (OUTPATIENT)
Dept: CT IMAGING | Facility: HOSPITAL | Age: 82
DRG: 840 | End: 2022-02-17
Attending: EMERGENCY MEDICINE
Payer: COMMERCIAL

## 2022-02-17 ENCOUNTER — HOSPITAL ENCOUNTER (INPATIENT)
Facility: HOSPITAL | Age: 82
LOS: 1 days | Discharge: HOSPICE | DRG: 840 | End: 2022-02-19
Attending: EMERGENCY MEDICINE | Admitting: INTERNAL MEDICINE
Payer: COMMERCIAL

## 2022-02-17 DIAGNOSIS — R06.02 SOB (SHORTNESS OF BREATH): ICD-10-CM

## 2022-02-17 DIAGNOSIS — H10.33 ACUTE CONJUNCTIVITIS OF BOTH EYES, UNSPECIFIED ACUTE CONJUNCTIVITIS TYPE: ICD-10-CM

## 2022-02-17 DIAGNOSIS — S22.49XA CLOSED FRACTURE OF MULTIPLE RIBS, UNSPECIFIED LATERALITY, INITIAL ENCOUNTER: ICD-10-CM

## 2022-02-17 DIAGNOSIS — M84.48XA PATHOLOGICAL FRACTURE OF THORACIC VERTEBRA, INITIAL ENCOUNTER: ICD-10-CM

## 2022-02-17 DIAGNOSIS — R22.2 CHEST MASS: ICD-10-CM

## 2022-02-17 DIAGNOSIS — M84.48XA PATHOLOGICAL FRACTURE OF CERVICAL VERTEBRA, INITIAL ENCOUNTER: ICD-10-CM

## 2022-02-17 DIAGNOSIS — C90.00 MULTIPLE MYELOMA NOT HAVING ACHIEVED REMISSION (H): Primary | ICD-10-CM

## 2022-02-17 LAB
ALBUMIN SERPL-MCNC: 3.1 G/DL (ref 3.5–5)
ALP SERPL-CCNC: 81 U/L (ref 45–120)
ALT SERPL W P-5'-P-CCNC: 11 U/L (ref 0–45)
ANION GAP SERPL CALCULATED.3IONS-SCNC: 7 MMOL/L (ref 5–18)
AST SERPL W P-5'-P-CCNC: 29 U/L (ref 0–40)
BASOPHILS # BLD AUTO: 0 10E3/UL (ref 0–0.2)
BASOPHILS NFR BLD AUTO: 0 %
BILIRUB SERPL-MCNC: 0.5 MG/DL (ref 0–1)
BNP SERPL-MCNC: 83 PG/ML (ref 0–155)
BUN SERPL-MCNC: 18 MG/DL (ref 8–28)
CALCIUM SERPL-MCNC: 9.2 MG/DL (ref 8.5–10.5)
CHLORIDE BLD-SCNC: 103 MMOL/L (ref 98–107)
CO2 SERPL-SCNC: 21 MMOL/L (ref 22–31)
CREAT SERPL-MCNC: 0.7 MG/DL (ref 0.6–1.1)
EOSINOPHIL # BLD AUTO: 0 10E3/UL (ref 0–0.7)
EOSINOPHIL NFR BLD AUTO: 0 %
ERYTHROCYTE [DISTWIDTH] IN BLOOD BY AUTOMATED COUNT: 14.7 % (ref 10–15)
GFR SERPL CREATININE-BSD FRML MDRD: 87 ML/MIN/1.73M2
GLUCOSE BLD-MCNC: 141 MG/DL (ref 70–125)
HCT VFR BLD AUTO: 35.4 % (ref 35–47)
HGB BLD-MCNC: 11.5 G/DL (ref 11.7–15.7)
IMM GRANULOCYTES # BLD: 0 10E3/UL
IMM GRANULOCYTES NFR BLD: 0 %
LYMPHOCYTES # BLD AUTO: 1.8 10E3/UL (ref 0.8–5.3)
LYMPHOCYTES NFR BLD AUTO: 25 %
MAGNESIUM SERPL-MCNC: 1.7 MG/DL (ref 1.8–2.6)
MCH RBC QN AUTO: 32.2 PG (ref 26.5–33)
MCHC RBC AUTO-ENTMCNC: 32.5 G/DL (ref 31.5–36.5)
MCV RBC AUTO: 99 FL (ref 78–100)
MONOCYTES # BLD AUTO: 0.9 10E3/UL (ref 0–1.3)
MONOCYTES NFR BLD AUTO: 13 %
NEUTROPHILS # BLD AUTO: 4.5 10E3/UL (ref 1.6–8.3)
NEUTROPHILS NFR BLD AUTO: 62 %
NRBC # BLD AUTO: 0 10E3/UL
NRBC BLD AUTO-RTO: 0 /100
PLATELET # BLD AUTO: 201 10E3/UL (ref 150–450)
POTASSIUM BLD-SCNC: 3.5 MMOL/L (ref 3.5–5)
PROT SERPL-MCNC: 9.8 G/DL (ref 6–8)
RBC # BLD AUTO: 3.57 10E6/UL (ref 3.8–5.2)
SODIUM SERPL-SCNC: 131 MMOL/L (ref 136–145)
TROPONIN I SERPL-MCNC: 0.02 NG/ML (ref 0–0.29)
WBC # BLD AUTO: 7.3 10E3/UL (ref 4–11)

## 2022-02-17 PROCEDURE — 99220 PR INITIAL OBSERVATION CARE,LEVEL III: CPT | Performed by: INTERNAL MEDICINE

## 2022-02-17 PROCEDURE — 85025 COMPLETE CBC W/AUTO DIFF WBC: CPT | Performed by: EMERGENCY MEDICINE

## 2022-02-17 PROCEDURE — 71275 CT ANGIOGRAPHY CHEST: CPT

## 2022-02-17 PROCEDURE — 83880 ASSAY OF NATRIURETIC PEPTIDE: CPT | Performed by: EMERGENCY MEDICINE

## 2022-02-17 PROCEDURE — 84484 ASSAY OF TROPONIN QUANT: CPT | Performed by: EMERGENCY MEDICINE

## 2022-02-17 PROCEDURE — 70450 CT HEAD/BRAIN W/O DYE: CPT

## 2022-02-17 PROCEDURE — 96360 HYDRATION IV INFUSION INIT: CPT | Mod: 59

## 2022-02-17 PROCEDURE — 84145 PROCALCITONIN (PCT): CPT | Performed by: EMERGENCY MEDICINE

## 2022-02-17 PROCEDURE — G0378 HOSPITAL OBSERVATION PER HR: HCPCS

## 2022-02-17 PROCEDURE — 96361 HYDRATE IV INFUSION ADD-ON: CPT

## 2022-02-17 PROCEDURE — 36415 COLL VENOUS BLD VENIPUNCTURE: CPT | Performed by: EMERGENCY MEDICINE

## 2022-02-17 PROCEDURE — 72131 CT LUMBAR SPINE W/O DYE: CPT

## 2022-02-17 PROCEDURE — 99285 EMERGENCY DEPT VISIT HI MDM: CPT | Mod: 25

## 2022-02-17 PROCEDURE — 83735 ASSAY OF MAGNESIUM: CPT | Performed by: EMERGENCY MEDICINE

## 2022-02-17 PROCEDURE — 72125 CT NECK SPINE W/O DYE: CPT

## 2022-02-17 PROCEDURE — 258N000003 HC RX IP 258 OP 636: Performed by: EMERGENCY MEDICINE

## 2022-02-17 PROCEDURE — 250N000011 HC RX IP 250 OP 636: Performed by: EMERGENCY MEDICINE

## 2022-02-17 PROCEDURE — 80053 COMPREHEN METABOLIC PANEL: CPT | Performed by: EMERGENCY MEDICINE

## 2022-02-17 PROCEDURE — 93005 ELECTROCARDIOGRAM TRACING: CPT | Performed by: EMERGENCY MEDICINE

## 2022-02-17 PROCEDURE — 72128 CT CHEST SPINE W/O DYE: CPT

## 2022-02-17 PROCEDURE — 999N000285 HC STATISTIC VASC ACCESS LAB DRAW WITH PIV START

## 2022-02-17 PROCEDURE — 999N000127 HC STATISTIC PERIPHERAL IV START W US GUIDANCE

## 2022-02-17 RX ORDER — IOPAMIDOL 755 MG/ML
100 INJECTION, SOLUTION INTRAVASCULAR ONCE
Status: COMPLETED | OUTPATIENT
Start: 2022-02-17 | End: 2022-02-17

## 2022-02-17 RX ORDER — OXYCODONE HYDROCHLORIDE 30 MG/1
30 TABLET, FILM COATED, EXTENDED RELEASE ORAL EVERY 12 HOURS
COMMUNITY
Start: 2022-02-10

## 2022-02-17 RX ORDER — OXYCODONE HYDROCHLORIDE 5 MG/1
1-2 TABLET ORAL EVERY 4 HOURS PRN
COMMUNITY
Start: 2021-11-10

## 2022-02-17 RX ADMIN — SODIUM CHLORIDE 250 ML: 9 INJECTION, SOLUTION INTRAVENOUS at 20:00

## 2022-02-17 RX ADMIN — IOPAMIDOL 100 ML: 755 INJECTION, SOLUTION INTRAVENOUS at 19:54

## 2022-02-17 ASSESSMENT — ENCOUNTER SYMPTOMS
VOMITING: 0
WOUND: 0
DIARRHEA: 0
DYSURIA: 0
ABDOMINAL PAIN: 0
NAUSEA: 0
CONFUSION: 0
DIZZINESS: 0
HEADACHES: 0
SHORTNESS OF BREATH: 1
BACK PAIN: 1
NECK PAIN: 0
LIGHT-HEADEDNESS: 0
COUGH: 0
TROUBLE SWALLOWING: 0
FEVER: 0

## 2022-02-17 NOTE — ED PROVIDER NOTES
"ED Provider In Triage Note  [facility]  Encounter Date: Feb 17, 2022    Chief Complaint   Patient presents with     Shortness of Breath       Brief HPI:   Amelia Fry is a 81 year old female presenting to the Emergency Department with a chief complaint of worsening SOB. COVID over 2 weeks ago. Fell 5 days ago. Worse back pain since fall.     At cancer care, could not get out of care. Sent here.     Brief Physical Exam:  BP (!) 144/75   Pulse 120   Temp 99.9  F (37.7  C) (Oral)   Resp 16   Ht 1.651 m (5' 5\")   Wt 47.2 kg (104 lb)   SpO2 97%   BMI 17.31 kg/m    General: Non-toxic appearing  HEENT: Atraumatic  Resp: No respiratory distress, 1 liter NC for oxygen 90 percent  Abdomen: Non-peritoneal  Neuro: Alert, oriented, answers questions appropriately  Psych: Behavior appropriate    Plan Initiated in Triage:  Orders Placed This Encounter     Head CT w/o contrast     Cervical spine CT w/o contrast     CT Thoracic Spine w/o Contrast     Lumbar spine CT w/o contrast     CT Chest Pulmonary Embolism w Contrast     Troponin I (now)     Comprehensive metabolic panel     B-Type Natriuretic Peptide ([facility] Only)       PIT Dispo:   Place patient in the next available ED bed    Aldo Barrios MD on 2/17/2022 at 5:26 PM    Patient was evaluated by the Physician in Triage due to a limitation of available rooms in the Emergency Department. A plan of care was discussed based on the information obtained on the initial evaluation and patient was consuled to return back to the Emergency Department lobby after this initial evalutaiton until results were obtained or a room became available in the Emergency Department. Patient was counseled not to leave prior to receiving the results of their workup.        Aldo Barrios MD  02/17/22 1727    "

## 2022-02-17 NOTE — ED TRIAGE NOTES
Pt legally blind,    Lives with sister,   reports short of breath today, had Covid over 2 wks ago. Pt had fall 5 days ago, and since has back pain. 88% on room air.

## 2022-02-18 LAB
MAGNESIUM SERPL-MCNC: 1.7 MG/DL (ref 1.8–2.6)
POTASSIUM BLD-SCNC: 3 MMOL/L (ref 3.5–5)
POTASSIUM BLD-SCNC: 3.8 MMOL/L (ref 3.5–5)
PROCALCITONIN SERPL-MCNC: 0.17 NG/ML (ref 0–0.49)

## 2022-02-18 PROCEDURE — 84132 ASSAY OF SERUM POTASSIUM: CPT | Performed by: INTERNAL MEDICINE

## 2022-02-18 PROCEDURE — 250N000013 HC RX MED GY IP 250 OP 250 PS 637: Performed by: FAMILY MEDICINE

## 2022-02-18 PROCEDURE — 250N000013 HC RX MED GY IP 250 OP 250 PS 637: Performed by: CLINICAL NURSE SPECIALIST

## 2022-02-18 PROCEDURE — 99233 SBSQ HOSP IP/OBS HIGH 50: CPT | Performed by: FAMILY MEDICINE

## 2022-02-18 PROCEDURE — 250N000013 HC RX MED GY IP 250 OP 250 PS 637: Performed by: INTERNAL MEDICINE

## 2022-02-18 PROCEDURE — 96374 THER/PROPH/DIAG INJ IV PUSH: CPT

## 2022-02-18 PROCEDURE — 99222 1ST HOSP IP/OBS MODERATE 55: CPT | Performed by: NURSE PRACTITIONER

## 2022-02-18 PROCEDURE — 99225 ZZC SUBSEQUENT OBSERVATION CARE,LEVEL II: CPT | Performed by: NURSE PRACTITIONER

## 2022-02-18 PROCEDURE — 120N000001 HC R&B MED SURG/OB

## 2022-02-18 PROCEDURE — 83735 ASSAY OF MAGNESIUM: CPT | Performed by: INTERNAL MEDICINE

## 2022-02-18 PROCEDURE — 99207 PR CONSULT E&M CHANGED TO INITIAL LEVEL: CPT | Performed by: CLINICAL NURSE SPECIALIST

## 2022-02-18 PROCEDURE — 250N000011 HC RX IP 250 OP 636: Performed by: INTERNAL MEDICINE

## 2022-02-18 PROCEDURE — 258N000003 HC RX IP 258 OP 636: Performed by: INTERNAL MEDICINE

## 2022-02-18 PROCEDURE — 250N000009 HC RX 250: Performed by: FAMILY MEDICINE

## 2022-02-18 PROCEDURE — 99220 PR INITIAL OBSERVATION CARE,LEVEL III: CPT | Performed by: CLINICAL NURSE SPECIALIST

## 2022-02-18 PROCEDURE — G0378 HOSPITAL OBSERVATION PER HR: HCPCS

## 2022-02-18 PROCEDURE — 36415 COLL VENOUS BLD VENIPUNCTURE: CPT | Performed by: INTERNAL MEDICINE

## 2022-02-18 RX ORDER — NALOXONE HYDROCHLORIDE 0.4 MG/ML
0.2 INJECTION, SOLUTION INTRAMUSCULAR; INTRAVENOUS; SUBCUTANEOUS
Status: DISCONTINUED | OUTPATIENT
Start: 2022-02-18 | End: 2022-02-19 | Stop reason: HOSPADM

## 2022-02-18 RX ORDER — ACETAMINOPHEN 325 MG/1
650 TABLET ORAL EVERY 8 HOURS PRN
Status: DISCONTINUED | OUTPATIENT
Start: 2022-02-18 | End: 2022-02-19 | Stop reason: HOSPADM

## 2022-02-18 RX ORDER — AMOXICILLIN 250 MG
2 CAPSULE ORAL 2 TIMES DAILY
Status: DISCONTINUED | OUTPATIENT
Start: 2022-02-18 | End: 2022-02-19 | Stop reason: HOSPADM

## 2022-02-18 RX ORDER — LEVOTHYROXINE SODIUM 100 UG/1
100 TABLET ORAL
Status: DISCONTINUED | OUTPATIENT
Start: 2022-02-18 | End: 2022-02-19 | Stop reason: HOSPADM

## 2022-02-18 RX ORDER — ASPIRIN 81 MG/1
81 TABLET, CHEWABLE ORAL DAILY
Status: DISCONTINUED | OUTPATIENT
Start: 2022-02-18 | End: 2022-02-19 | Stop reason: HOSPADM

## 2022-02-18 RX ORDER — DORZOLAMIDE HYDROCHLORIDE AND TIMOLOL MALEATE 20; 5 MG/ML; MG/ML
1 SOLUTION/ DROPS OPHTHALMIC 2 TIMES DAILY
Status: CANCELLED | OUTPATIENT
Start: 2022-02-18

## 2022-02-18 RX ORDER — POTASSIUM CHLORIDE 1500 MG/1
40 TABLET, EXTENDED RELEASE ORAL ONCE
Status: COMPLETED | OUTPATIENT
Start: 2022-02-18 | End: 2022-02-18

## 2022-02-18 RX ORDER — BRIMONIDINE TARTRATE 1 MG/ML
1 SOLUTION/ DROPS OPHTHALMIC 2 TIMES DAILY
Status: DISCONTINUED | OUTPATIENT
Start: 2022-02-18 | End: 2022-02-18

## 2022-02-18 RX ORDER — ACETAMINOPHEN 325 MG/1
975 TABLET ORAL 3 TIMES DAILY
Status: DISCONTINUED | OUTPATIENT
Start: 2022-02-18 | End: 2022-02-19 | Stop reason: HOSPADM

## 2022-02-18 RX ORDER — LIDOCAINE 50 MG/G
OINTMENT TOPICAL 3 TIMES DAILY
Status: DISCONTINUED | OUTPATIENT
Start: 2022-02-18 | End: 2022-02-19

## 2022-02-18 RX ORDER — OXYCODONE HYDROCHLORIDE 15 MG/1
30 TABLET, FILM COATED, EXTENDED RELEASE ORAL EVERY 12 HOURS
Status: DISCONTINUED | OUTPATIENT
Start: 2022-02-18 | End: 2022-02-19 | Stop reason: HOSPADM

## 2022-02-18 RX ORDER — LIDOCAINE 50 MG/G
OINTMENT TOPICAL 4 TIMES DAILY PRN
Status: DISCONTINUED | OUTPATIENT
Start: 2022-02-18 | End: 2022-02-19 | Stop reason: HOSPADM

## 2022-02-18 RX ORDER — NALOXONE HYDROCHLORIDE 0.4 MG/ML
0.4 INJECTION, SOLUTION INTRAMUSCULAR; INTRAVENOUS; SUBCUTANEOUS
Status: DISCONTINUED | OUTPATIENT
Start: 2022-02-18 | End: 2022-02-19 | Stop reason: HOSPADM

## 2022-02-18 RX ORDER — GABAPENTIN 250 MG/5ML
200 SOLUTION ORAL EVERY 12 HOURS SCHEDULED
Status: DISCONTINUED | OUTPATIENT
Start: 2022-02-18 | End: 2022-02-18

## 2022-02-18 RX ORDER — SODIUM CHLORIDE 9 MG/ML
INJECTION, SOLUTION INTRAVENOUS CONTINUOUS
Status: DISCONTINUED | OUTPATIENT
Start: 2022-02-18 | End: 2022-02-18

## 2022-02-18 RX ORDER — DORZOLAMIDE HYDROCHLORIDE AND TIMOLOL MALEATE 20; 5 MG/ML; MG/ML
1 SOLUTION/ DROPS OPHTHALMIC 2 TIMES DAILY
Status: DISCONTINUED | OUTPATIENT
Start: 2022-02-18 | End: 2022-02-19 | Stop reason: HOSPADM

## 2022-02-18 RX ORDER — OXYCODONE HYDROCHLORIDE 15 MG/1
30 TABLET, FILM COATED, EXTENDED RELEASE ORAL EVERY 12 HOURS
Status: DISCONTINUED | OUTPATIENT
Start: 2022-02-18 | End: 2022-02-18

## 2022-02-18 RX ORDER — OXYCODONE HYDROCHLORIDE 5 MG/1
5-10 TABLET ORAL EVERY 4 HOURS PRN
Status: DISCONTINUED | OUTPATIENT
Start: 2022-02-18 | End: 2022-02-19 | Stop reason: HOSPADM

## 2022-02-18 RX ORDER — AMOXICILLIN 250 MG
1 CAPSULE ORAL 2 TIMES DAILY
Status: DISCONTINUED | OUTPATIENT
Start: 2022-02-18 | End: 2022-02-19 | Stop reason: HOSPADM

## 2022-02-18 RX ORDER — ERYTHROMYCIN 5 MG/G
OINTMENT OPHTHALMIC EVERY 8 HOURS SCHEDULED
Status: DISCONTINUED | OUTPATIENT
Start: 2022-02-18 | End: 2022-02-19 | Stop reason: HOSPADM

## 2022-02-18 RX ORDER — BRIMONIDINE TARTRATE 1 MG/ML
1 SOLUTION/ DROPS OPHTHALMIC 2 TIMES DAILY
Status: DISCONTINUED | OUTPATIENT
Start: 2022-02-18 | End: 2022-02-19 | Stop reason: HOSPADM

## 2022-02-18 RX ORDER — LIDOCAINE 50 MG/G
OINTMENT TOPICAL 3 TIMES DAILY
Status: DISCONTINUED | OUTPATIENT
Start: 2022-02-18 | End: 2022-02-18

## 2022-02-18 RX ORDER — POLYETHYLENE GLYCOL 3350 17 G/17G
17 POWDER, FOR SOLUTION ORAL DAILY PRN
Status: DISCONTINUED | OUTPATIENT
Start: 2022-02-18 | End: 2022-02-19 | Stop reason: HOSPADM

## 2022-02-18 RX ORDER — MAGNESIUM SULFATE HEPTAHYDRATE 40 MG/ML
2 INJECTION, SOLUTION INTRAVENOUS ONCE
Status: COMPLETED | OUTPATIENT
Start: 2022-02-18 | End: 2022-02-18

## 2022-02-18 RX ORDER — GABAPENTIN 250 MG/5ML
300 SOLUTION ORAL AT BEDTIME
Status: DISCONTINUED | OUTPATIENT
Start: 2022-02-18 | End: 2022-02-19

## 2022-02-18 RX ORDER — LIDOCAINE 40 MG/G
CREAM TOPICAL
Status: DISCONTINUED | OUTPATIENT
Start: 2022-02-18 | End: 2022-02-19 | Stop reason: HOSPADM

## 2022-02-18 RX ADMIN — BRIMONIDINE TARTRATE 1 DROP: 1 SOLUTION/ DROPS OPHTHALMIC at 20:44

## 2022-02-18 RX ADMIN — GABAPENTIN 300 MG: 250 SOLUTION ORAL at 20:59

## 2022-02-18 RX ADMIN — SODIUM CHLORIDE: 9 INJECTION, SOLUTION INTRAVENOUS at 01:47

## 2022-02-18 RX ADMIN — OXYCODONE HYDROCHLORIDE 30 MG: 15 TABLET, FILM COATED, EXTENDED RELEASE ORAL at 01:40

## 2022-02-18 RX ADMIN — OXYCODONE HYDROCHLORIDE 30 MG: 15 TABLET, FILM COATED, EXTENDED RELEASE ORAL at 17:54

## 2022-02-18 RX ADMIN — BRIMONIDINE TARTRATE 1 DROP: 1 SOLUTION/ DROPS OPHTHALMIC at 08:45

## 2022-02-18 RX ADMIN — POTASSIUM CHLORIDE 40 MEQ: 1500 TABLET, EXTENDED RELEASE ORAL at 08:27

## 2022-02-18 RX ADMIN — MAGNESIUM SULFATE HEPTAHYDRATE 2 G: 40 INJECTION, SOLUTION INTRAVENOUS at 08:24

## 2022-02-18 RX ADMIN — DOCUSATE SODIUM 50 MG AND SENNOSIDES 8.6 MG 2 TABLET: 8.6; 5 TABLET, FILM COATED ORAL at 20:49

## 2022-02-18 RX ADMIN — LEVOTHYROXINE SODIUM 100 MCG: 0.1 TABLET ORAL at 06:13

## 2022-02-18 RX ADMIN — LIDOCAINE: 50 OINTMENT TOPICAL at 20:43

## 2022-02-18 RX ADMIN — ASPIRIN 81 MG CHEWABLE TABLET 81 MG: 81 TABLET CHEWABLE at 08:22

## 2022-02-18 RX ADMIN — DOCUSATE SODIUM 50 MG AND SENNOSIDES 8.6 MG 2 TABLET: 8.6; 5 TABLET, FILM COATED ORAL at 08:22

## 2022-02-18 ASSESSMENT — ACTIVITIES OF DAILY LIVING (ADL)
ADLS_ACUITY_SCORE: 16
ADLS_ACUITY_SCORE: 16
DEPENDENT_IADLS:: CLEANING;COOKING;LAUNDRY;SHOPPING;MEAL PREPARATION;MEDICATION MANAGEMENT;MONEY MANAGEMENT;TRANSPORTATION
ADLS_ACUITY_SCORE: 16

## 2022-02-18 NOTE — H&P
[facility]    History and Physical - Hospitalist Service       Date of Admission:  2/17/2022    Assessment & Plan      Amelia Fry is a 81 year old female with past medical history significant for multiple myeloma, severe protein calorie malnutrition, hypothyroidism, and drug-induced polyneuropathy admitted on 2/17/2022 due to shortness of breath and a fall 1 week ago. She was found to have a chest wall mass, closed fractures of multiple ribs, and a new C3 pathologic fracture.  Neurosurgery was consulted by the ED and placed in a hard collar.    New C3 superior endplate pathologic fracture  Multiple pathological thoracic compression fractures  Chronic back pain  Chronic opiate use  -Neurosurgery consulted  - hard collar placed, strict bedrest, pain assessment  -Occupational Therapy and physical therapy consulted  -Continuing home pain medications OxyContin 30 mg every 12 hours, oxycodone 5 to 10 mg every 4 hours as needed for moderate to severe pain, Senokot twice daily  Imaging:  - CT cervical spine 2/17:1.  Pathologic fracture involving the superior plate of C3, new since cervical spine MRI 12/08/2017, likely acute.2.  Lucent  lesions throughout the cervical and partially imaged thoracic spine, compatible with the patient's reported history of myeloma  -CT thoracic 2/17: 1.  Multiple lucencies lesions within the thoracic spine compatible with the patient's reported history of multiple myeloma.2.  Multilevel compression fractures at T2, T4, T6-T9, T10, and T12, unchanged from 01/17/20223  -CT lumbar 2/17:1.  Superior endplate L2 compression fracture, unchanged since chest CT 01/17/2022, though new since lumbar spine MRI 09/02/2021, age-indeterminate.2. Chronic L1, L3 and L4 compression fractures, unchanged.4.  Marrow replacing process within the S2 vertebral body and posterior elements, unchanged.5.  Degenerative lumbar spondylosis     Pathological fractures of multiple ribs secondary  to multiple myeloma  Expansile mass extending from the fourth rib on the right  Chest pain likely secondary to rib fractures/mass  -Patient has pain on palpation of the fourth and fifth ribs, no difficulty breathing associated  -CT chest 2/17: 1.  Diffuse myelomatous change in the skeleton including with multiple vertebral body compression fractures and multiple rib fractures with large expansile mass anteriorly on the right at rib 4. Interval callus formation about some of these pathological fractures since the study done a month ago. There are multiple others areas of rib destruction which likely explains her chest pain.    Multiple myeloma  -Albumin 3.1, total protein 9.8, high A/G ratio 2.1  -Follows with [place] [facility] last appointment was today  -Has been treated with daratumumab and Velcade in the past  -Multiple bone lesions as noted above    Hyponatremia-Na 131  -We will monitor  -Started on normal saline at 50 mL/h     Hypothyroidism  -Continue home Synthroid 100 mcg before breakfast    Glaucoma  Conjunctivitis of the right eye  -Right eye has red conjunctiva, the upper and lower eyelids are also red, greenish discharge  -Patient stated that she is on 4 different medications for glaucoma and her eye appears very irritated, there is concern for either viral or bacterial conjunctivitis versus irritation from multiple medications  -We will continue with the most recent and most used per pharmacy reconciliation based on fill history which is Alphagan twice daily and Rhopressa at bedtime  -Consider erythromycin ointment     Severe protein calorie malnutrition  Underweight  -Total protein is not indicative of her true protein state as she has multiple myeloma  -Patient is frail with muscle wasting, BMI is 17  -Nutrition consulted to evaluate    Mild anemia of chronic disease  -Hemoglobin 11.5, MCV 99  -We will monitor    Hypomagnesemia- 1.7  -We will monitor and replace as needed     Diet: Regular Diet Adult  DVT  Prophylaxis: Pneumatic Compression Devices  Quintana Catheter: Not present  Central Lines: None  Cardiac Monitoring: None  Code Status: No CPR- Do NOT Intubate    Clinically Significant Risk Factors Present on Admission     Disposition Plan   Expected Discharge: 02/21/2022   Anticipated discharge location:  Awaiting care coordination huddle  Delays: Neurosurgery to see, possible back brace, nutrition consult, PT OT to see       The patient's care was discussed with the Patient.    Adriana Virgen MD  Hospitalist [facility]  Securely message with the Vocera Web Console (learn more here)  Text page via [employer] Paging/Directory         ______________________________________________________________________    Chief Complaint   Shortness of breath    History is obtained from the patient    History of Present Illness   Amelia Fry is a 81 year old female who was at her [place] oncology appointment for multiple myeloma with her sister.  While she was there her sister noted that she did not seem herself and that she seemed short of breath.  She had Covid 2 weeks ago but has been recovering.  She has also had increasing weakness over the past week.  About a week ago she did have a fall trying to go to the bathroom, she denies hitting her head but admits to hitting her back on the toilet seat.  This increased her back pain above her chronic back pain.  She is also having significant chest pain.  She does have reproducible chest pain with significant pain on palpation of the right and left chest    Review of Systems    The 10 point Review of Systems is negative other than noted in the HPI or here.  She denies any additional symptoms other than fatigue, shortness of breath, back pain.  Significantly she denies any cough, diarrhea, GI issues, or palpitations.    Past Medical History    I have reviewed this patient's medical history and updated it with pertinent information if needed.   Past Medical  History:   Diagnosis Date     Chronic back pain      COVID-19 January 2022     Hypertension      Legal blindness      Multiple myeloma (H)        Past Surgical History   I have reviewed this patient's surgical history and updated it with pertinent information if needed.  Past Surgical History:   Procedure Laterality Date     EYE SURGERY         Social History   I have reviewed this patient's social history and updated it with pertinent information if needed.  Social History     Tobacco Use     Smoking status: Never Smoker     Smokeless tobacco: Never Used   Substance Use Topics     Alcohol use: Yes     Comment: Alcoholic Drinks/day: a few glasses of wine a month     Drug use: No       Family History   No significant family history, including no history of: CAD or diabetes    Prior to Admission Medications   Prior to Admission Medications   Prescriptions Last Dose Informant Patient Reported? Taking?   MELALEUCA SC 2/17/2022 at AM  Yes Yes   Sig: Take by mouth daily Melaleuca vitamin supplements containing - Multivitamin, minerals, calcium   OXYCONTIN 30 MG 12 hr tablet 2/17/2022 at AM  Yes Yes   Sig: Take 30 mg by mouth every 12 hours   acetaminophen (TYLENOL) 325 MG tablet Unknown at PRN  Yes Yes   Sig: Take 650 mg by mouth every 8 hours as needed for fever or pain    aspirin 81 mg chewable tablet 2/17/2022 at AM  Yes Yes   Sig: Take 81 mg by mouth daily    brimonidine (ALPHAGAN P) 0.1 % ophthalmic solution 2/17/2022 at AM  Yes Yes   Sig: Place 1 drop into the right eye 2 times daily    cholecalciferol 25 MCG (1000 UT) TABS Not Taking at Unknown time  No No   Sig: Take 1,000 Units by mouth daily HOLD UNTIL EGD IS DONE   Patient not taking: Reported on 2/17/2022   dorzolamide-timolol (COSOPT) 2-0.5 % ophthalmic solution 2/17/2022 at AM  Yes Yes   Sig: Place 1 drop into the right eye 2 times daily   gabapentin (NEURONTIN) 300 MG capsule Not Taking at Unknown time  No No   Sig: Take 1 capsule (300 mg) by mouth  every evening HOLD UNTIL EGD IS DONE   Patient not taking: Reported on 2/17/2022   levothyroxine (SYNTHROID, LEVOTHROID) 100 MCG tablet 2/17/2022 at AM  Yes Yes   Sig: Take 100 mcg by mouth daily before breakfast    loperamide (IMODIUM) 2 MG capsule Unknown at PRN  Yes Yes   Sig: Take 2 mg by mouth 4 times daily as needed for diarrhea    netarsudil (RHOPRESSA) 0.02 % ophthalmic solution 2/16/2022 at HS  Yes Yes   Sig: Place 1 drop into the right eye At Bedtime   ondansetron (ZOFRAN) 8 MG tablet Unknown at PRN  Yes Yes   Sig: Take 8 mg by mouth every 8 hours as needed for nausea   oxyCODONE (ROXICODONE) 5 MG tablet Unknown at PRN  Yes Yes   Sig: Take 1-2 tablets by mouth every 4 hours as needed for pain   sennosides (SENOKOT) 8.8 MG/5ML syrup 2/16/2022 at HS  No Yes   Sig: Take 5 mLs by mouth 2 times daily   Patient taking differently: Take 10 mLs by mouth At Bedtime    tafluprost, PF, (ZIOPTAN, PF,) 0.0015 % Dpet ophthalmic dropperette 2/16/2022 at HS  Yes Yes   Sig: Place 1 drop into the right eye At Bedtime    vitamin E (TOCOPHEROL) 400 units (180 mg) capsule Not Taking at Unknown time  No No   Sig: Take 1 capsule (400 Units) by mouth daily HOLD UNTIL YOU HAVE EGD DONE   Patient not taking: Reported on 2/17/2022      Facility-Administered Medications: None     Allergies   Allergies   Allergen Reactions     Codeine Anaphylaxis     Chest pressure, shortness of breath     Fentanyl Anaphylaxis     Bee Venom Swelling       Physical Exam   Vital Signs: Temp: 98.4  F (36.9  C) Temp src: Oral BP: 133/69 Pulse: 101   Resp: 18 SpO2: 94 % O2 Device: None (Room air) Oxygen Delivery: 1 LPM  Weight: 104 lbs 0 oz    Constitutional: awake, alert, cooperative, moderate distress   eyes: Right eyelids upper and lower red, sclera on the right is red, green discharge from the right eye with crusting, left eye appears normal, pupils equal, round, extra ocular muscles intact  Respiratory: No increased work of breathing, good air  exchange, clear to auscultation bilaterally, no crackles or wheezing  Cardiovascular: Normal apical impulse, regular rhythm, tachycardia  GI: normal bowel sounds, soft, non-distended, non-tender  Skin: no rashes and no jaundice  Musculoskeletal: no lower extremity pitting edema present, generalized weakness, patient in a hard collar at time of exam, pain on palpation of the ribs bilaterally from the anterior  Neurologic: Awake, alert, oriented to name, place and time. No gross focal abnormalities   Neuropsychiatric: Appropriate mood and affect    Data   Data reviewed today: I reviewed all medications, new labs and imaging results over the last 24 hours. I personally reviewed no images or EKG's today.    Recent Labs   Lab 02/17/22  1810   WBC 7.3   HGB 11.5*   MCV 99      *   POTASSIUM 3.5   CHLORIDE 103   CO2 21*   BUN 18   CR 0.70   ANIONGAP 7   ACACIA 9.2   *   ALBUMIN 3.1*   PROTTOTAL 9.8*   BILITOTAL 0.5   ALKPHOS 81   ALT 11   AST 29     Most Recent 3 CBC's:Recent Labs   Lab Test 02/17/22  1810 01/20/22  0549 01/19/22  0635   WBC 7.3 4.7 6.8   HGB 11.5* 12.3 13.3   MCV 99 101* 101*    175 187     Most Recent 3 BMP's:Recent Labs   Lab Test 02/17/22  1810 02/08/22  1438 01/20/22  0549   * 134* 135*   POTASSIUM 3.5 3.9 3.5   CHLORIDE 103 103 105   CO2 21* 18* 24   BUN 18 17 21   CR 0.70 0.68 0.58*   ANIONGAP 7 13 6   ACACIA 9.2 9.0 8.0*   * 114 103     Most Recent 2 LFT's:Recent Labs   Lab Test 02/17/22  1810 02/08/22  1438   AST 29 37   ALT 11 33   ALKPHOS 81 117   BILITOTAL 0.5 0.4     Most Recent 3 INR's:Recent Labs   Lab Test 09/02/21  1933   INR 1.34*     Recent Results (from the past 24 hour(s))   Head CT w/o contrast    Addendum: 2/17/2022    Addendum/  impression:    There are a few lucent lesions throughout the calvarium, compatible the patient's reported history of myeloma.      Narrative    EXAM: CT HEAD W/O CONTRAST  LOCATION: [facility]  DATE/TIME: 2/17/2022 7:46 PM    INDICATION: Fall, weakness  COMPARISON: None.  TECHNIQUE: Routine CT Head without IV contrast. Multiplanar reformats. Dose reduction techniques were used.    FINDINGS:  INTRACRANIAL CONTENTS: No evidence of acute intracranial hemorrhage or mass effect. Scattered foci of decreased attenuation within the cerebral hemispheric white matter which are nonspecific, though most commonly ascribed to chronic small vessel ischemic   disease. The ventricles and sulci are prominent consistent with mild brain parenchymal volume loss. Gray-white matter differentiation is maintained. The basilar cisterns are patent.    VISUALIZED ORBITS/SINUSES/MASTOIDS: Bilateral cataract surgery. Right scleral banding. Postsurgical changes left globe. The partially imaged paranasal sinuses and mastoid air cells are unremarkable.     BONES/SOFT TISSUES: The visualized skull base and calvarium are unremarkable.      Impression    IMPRESSION:    1.  No evidence of acute intracranial hemorrhage or mass effect.  2.  Mild nonspecific white matter changes.  3.  Mild brain parenchymal volume loss.   Cervical spine CT w/o contrast    Addendum: 2/17/2022    Addendum/  impression:    Dr. Leonard discussed the results with Dr. Piña on 2/17/2022 9:21 PM by telephone.      Narrative    EXAM: CT CERVICAL SPINE W/O CONTRAST  LOCATION: [facility]  DATE/TIME: 2/17/2022 7:47 PM    INDICATION: Back pain  COMPARISON: Cervical spine MRI 12/08/2017  TECHNIQUE: Routine CT Cervical Spine without IV contrast. Multiplanar reformats. Dose reduction techniques were used.    FINDINGS:  Pathologic fracture involving the superior plate of C3, new since cervical spine MRI 12/08/2017, likely acute. Chronic pathologic T2 compression fracture which was present on prior cervical spine MRI 12/08/2017. Subtle chronic superior endplate T1   compression fracture, unchanged from 12/18/2017. No evidence of acute  fracture or subluxation of the cervical spine by CT imaging. The vertebral bodies of the cervical spine otherwise have normal stature and alignment. Multilevel degenerative disc   disease of the cervical spine with disc height loss, most pronounced at C5/C6. The partially imaged intracranial contents are unremarkable. No prevertebral soft tissue swelling. The partially imaged lung apices are unremarkable.     Craniovertebral junction and C1-C2: The odontoid process is well approximated with the anterior body of C1 and well aligned between the lateral masses of C1.    C2-C3: No posterior disc bulge or spinal canal narrowing. No neural foraminal narrowing.     C3-C4: No posterior disc bulge or spinal canal narrowing. Uncovertebral joint disease and facet arthropathy with moderate right neural foraminal narrowing. No left neural foraminal narrowing.     C4-C5: No posterior disc bulge or spinal canal narrowing. Uncovertebral joint disease and facet arthropathy with mild right neural foraminal narrowing. No left neural foraminal narrowing.     C5-C6: No posterior disc bulge or spinal canal narrowing. Uncovertebral joint disease and facet arthropathy with severe right and mild left neural foraminal narrowing.     C6-C7: No posterior disc bulge or spinal canal narrowing. No neural foraminal narrowing.     C7-T1: No posterior disc bulge or spinal canal narrowing. No neural foraminal narrowing.       Impression    IMPRESSION:  1.  Pathologic fracture involving the superior plate of C3, new since cervical spine MRI 12/08/2017, likely acute.  2.  Lucent  lesions throughout the cervical and partially imaged thoracic spine, compatible with the patient's reported history of myeloma.   3.  No other evidence of acute fracture or subluxation of the cervical spine by CT imaging.  4.  Chronic pathologic T2 compression fracture which was present on prior cervical spine MRI 12/08/2017.    CT Thoracic Spine w/o Contrast    Narrative     EXAM: CT THORACIC SPINE W/O CONTRAST, CT LUMBAR SPINE W/O CONTRAST  LOCATION: [facility]  DATE/TIME: 2/17/2022 7:47 PM    INDICATION: Mid-back pain, compression fracture suspected  COMPARISON: Cervical spine CT performed same day. Cervical spine MRI 12/08/2017. Chest CT 01/17/2022  TECHNIQUE: Routine CT Thoracic and Lumbar Spine without IV contrast. Multiplanar reformats. Dose reduction techniques were used.     FINDINGS:  Thoracic spine CT:  Chronic T2 pathologic fracture, unchanged from cervical spine MRI 12/08/2017. Lucent lesions throughout the thoracic spine compatible with the patient's reported history of multiple myeloma. Pathologic compression fractures at T4 T6, T7, T8 T9 and T10,   and T12 unchanged from 01/17/2022. Chronic superior endplate T12 compression fracture, unchanged. No definite evidence of new acute fracture or subluxation of the thoracic spine by CT imaging., However given the patient's extensive myeloma, if there is   concern for acute fracture, MR would be more sensitive.    The partially imaged lungs are unremarkable.    Lumbar spine CT:  Lucent lesions throughout the lumbar spine compatible with the patient's reported history of myeloma, marrow replacing process with the S2 vertebral body and posterior elements, unchanged. Superior endplate L2 compression fracture,, unchanged since chest   CT 01/17/2022, though new since lumbar spine MRI 09/02/2021, age-indeterminate. Chronic L1, L3 and L4 compression fractures, unchanged. No other definite evidence of acute fracture or subluxation of the lumbar spine by CT imaging.    T12/L1:  Symmetric disc bulge without spinal canal narrowing. No neural foraminal narrowing.    L1/L2:  Symmetric disc bulge and mild facet arthropathy without spinal canal narrowing. No neural foraminal narrowing.    L2/L3:  Symmetric disc bulge without spinal canal narrowing. Mild bilateral neural foraminal narrowing.    L3/L4:  Symmetric disc  bulge and moderate facet arthropathy without spinal canal narrowing. No neural foraminal narrowing.      L4/L5:  Symmetric disc bulge and moderate facet arthropathy without spinal canal narrowing. Moderate to severe right and moderate left neural foraminal narrowing.     L5/S1: Symmetric disc bulge and moderate facet arthropathy without spinal canal narrowing. No neural foraminal narrowing.       Impression    IMPRESSION:    Thoracic spine CT:  1.  Multiple lucencies lesions within the thoracic spine compatible with the patient's reported history of multiple myeloma.  2.  Multilevel compression fractures at T2, T4, T6-T9, T10, and T12, unchanged from 01/17/2022  3.  No definite evidence of new acute fracture or subluxation of the thoracic spine by CT imaging.    Lumbar spine CT:  1.  Superior endplate L2 compression fracture, unchanged since chest CT 01/17/2022, though new since lumbar spine MRI 09/02/2021, age-indeterminate.  2.  No other evidence of acute fracture or subluxation of the lumbar spine by CT imaging.  3.  Chronic L1, L3 and L4 compression fractures, unchanged.  4.  Marrow replacing process within the S2 vertebral body and posterior elements, unchanged.  5.  Degenerative lumbar spondylosis with level by level analysis as described above.   Lumbar spine CT w/o contrast    Narrative    EXAM: CT THORACIC SPINE W/O CONTRAST, CT LUMBAR SPINE W/O CONTRAST  LOCATION: [facility]  DATE/TIME: 2/17/2022 7:47 PM    INDICATION: Mid-back pain, compression fracture suspected  COMPARISON: Cervical spine CT performed same day. Cervical spine MRI 12/08/2017. Chest CT 01/17/2022  TECHNIQUE: Routine CT Thoracic and Lumbar Spine without IV contrast. Multiplanar reformats. Dose reduction techniques were used.     FINDINGS:  Thoracic spine CT:  Chronic T2 pathologic fracture, unchanged from cervical spine MRI 12/08/2017. Lucent lesions throughout the thoracic spine compatible with the patient's  reported history of multiple myeloma. Pathologic compression fractures at T4 T6, T7, T8 T9 and T10,   and T12 unchanged from 01/17/2022. Chronic superior endplate T12 compression fracture, unchanged. No definite evidence of new acute fracture or subluxation of the thoracic spine by CT imaging., However given the patient's extensive myeloma, if there is   concern for acute fracture, MR would be more sensitive.    The partially imaged lungs are unremarkable.    Lumbar spine CT:  Lucent lesions throughout the lumbar spine compatible with the patient's reported history of myeloma, marrow replacing process with the S2 vertebral body and posterior elements, unchanged. Superior endplate L2 compression fracture,, unchanged since chest   CT 01/17/2022, though new since lumbar spine MRI 09/02/2021, age-indeterminate. Chronic L1, L3 and L4 compression fractures, unchanged. No other definite evidence of acute fracture or subluxation of the lumbar spine by CT imaging.    T12/L1:  Symmetric disc bulge without spinal canal narrowing. No neural foraminal narrowing.    L1/L2:  Symmetric disc bulge and mild facet arthropathy without spinal canal narrowing. No neural foraminal narrowing.    L2/L3:  Symmetric disc bulge without spinal canal narrowing. Mild bilateral neural foraminal narrowing.    L3/L4:  Symmetric disc bulge and moderate facet arthropathy without spinal canal narrowing. No neural foraminal narrowing.      L4/L5:  Symmetric disc bulge and moderate facet arthropathy without spinal canal narrowing. Moderate to severe right and moderate left neural foraminal narrowing.     L5/S1: Symmetric disc bulge and moderate facet arthropathy without spinal canal narrowing. No neural foraminal narrowing.       Impression    IMPRESSION:    Thoracic spine CT:  1.  Multiple lucencies lesions within the thoracic spine compatible with the patient's reported history of multiple myeloma.  2.  Multilevel compression fractures at T2, T4,  T6-T9, T10, and T12, unchanged from 01/17/2022  3.  No definite evidence of new acute fracture or subluxation of the thoracic spine by CT imaging.    Lumbar spine CT:  1.  Superior endplate L2 compression fracture, unchanged since chest CT 01/17/2022, though new since lumbar spine MRI 09/02/2021, age-indeterminate.  2.  No other evidence of acute fracture or subluxation of the lumbar spine by CT imaging.  3.  Chronic L1, L3 and L4 compression fractures, unchanged.  4.  Marrow replacing process within the S2 vertebral body and posterior elements, unchanged.  5.  Degenerative lumbar spondylosis with level by level analysis as described above.   CT Chest Pulmonary Embolism w Contrast    Narrative    EXAM: CT CHEST PULMONARY EMBOLISM W CONTRAST  LOCATION: [facility]  DATE/TIME: 2/17/2022 7:48 PM    INDICATION: Shortness of breath. Multiple myeloma.  COMPARISON: Chest CTA 01/17/2022  TECHNIQUE: CT chest pulmonary angiogram during arterial phase injection of IV contrast. Multiplanar reformats and MIP reconstructions were performed. Dose reduction techniques were used.   CONTRAST: isovue 370 100ml    FINDINGS:  ANGIOGRAM CHEST: Excellent opacification of pulmonary arteries and branches. No evidence of pulmonary emboli. Thoracic aorta is slightly ectatic at 3.8 cm, unchanged.    LUNGS AND PLEURA: Mild dependent atelectasis. No effusions or suspicious lesions.    MEDIASTINUM/AXILLAE: No adenopathy.    CORONARY ARTERY CALCIFICATION: Cannot evaluate.    UPPER ABDOMEN: Normal.    MUSCULOSKELETAL: Bones are extremely demineralized with diffuse lytic lesions throughout. There are multiple ribs with areas of cortical breakthrough and large expansile lytic mass is seen again involving the anterior right fourth rib. Interval callus   formation about one of the pathological fractures laterally on the left  at T7 on and on the right at T5 anteriorly. Multiple thoracic vertebral body compression fractures are  again noted with vertebral plana at T9, unchanged. Chronic, upper sternal   fracture noted.      Impression    IMPRESSION:  1.  Diffuse myelomatous change in the skeleton including with multiple vertebral body compression fractures and multiple rib fractures with large expansile mass anteriorly on the right at rib 4. Interval callus formation about some of these pathological   fractures since the study done a month ago. There are multiple others areas of rib destruction which likely explains her chest pain.  2.  No evidence of pulmonary emboli.

## 2022-02-18 NOTE — PHARMACY-ADMISSION MEDICATION HISTORY
Pharmacy Note - Admission Medication History    Pertinent Provider Information: cholecalciferol, vitamin D, and gabapentin currently on hold until GI follow-up since she needs all meds chewable, liquid, or crushed.   ______________________________________________________________________    Prior To Admission (PTA) med list completed and updated in EMR.       PTA Med List   Medication Sig Last Dose     acetaminophen (TYLENOL) 325 MG tablet Take 650 mg by mouth every 8 hours as needed for fever or pain  Unknown at PRN     aspirin 81 mg chewable tablet Take 81 mg by mouth daily  2/17/2022 at AM     brimonidine (ALPHAGAN P) 0.1 % ophthalmic solution Place 1 drop into the right eye 2 times daily  2/17/2022 at AM     dorzolamide-timolol (COSOPT) 2-0.5 % ophthalmic solution Place 1 drop into the right eye 2 times daily 2/17/2022 at AM     levothyroxine (SYNTHROID, LEVOTHROID) 100 MCG tablet Take 100 mcg by mouth daily before breakfast  2/17/2022 at AM     loperamide (IMODIUM) 2 MG capsule Take 2 mg by mouth 4 times daily as needed for diarrhea  Unknown at PRN     MELALEUCA SC Take by mouth daily Melaleuca vitamin supplements containing - Multivitamin, minerals, calcium 2/17/2022 at AM     netarsudil (RHOPRESSA) 0.02 % ophthalmic solution Place 1 drop into the right eye At Bedtime 2/16/2022 at HS     ondansetron (ZOFRAN) 8 MG tablet Take 8 mg by mouth every 8 hours as needed for nausea Unknown at PRN     oxyCODONE (ROXICODONE) 5 MG tablet Take 1-2 tablets by mouth every 4 hours as needed for pain Unknown at PRN     OXYCONTIN 30 MG 12 hr tablet Take 30 mg by mouth every 12 hours 2/17/2022 at AM     sennosides (SENOKOT) 8.8 MG/5ML syrup Take 5 mLs by mouth 2 times daily (Patient taking differently: Take 10 mLs by mouth At Bedtime ) 2/16/2022 at HS     tafluprost, PF, (ZIOPTAN, PF,) 0.0015 % Dpet ophthalmic dropperette Place 1 drop into the right eye At Bedtime  2/16/2022 at HS       Information source(s): Family member and  Jonathan/Wil  Method of interview communication: phone with Davide Loyola    Summary of Changes to PTA Med List  New: Oxycontin, oxycodone  Discontinued: morphine, augmentin  Changed: Senna 5 ml BID to 10 ml HS    Patient was asked about OTC/herbal products specifically.  PTA med list reflects this.    Allergies were reviewed, assessed, and updated with the patient.      Medications currently not available for use during hospital stay. Family/Patient representative states they will bring 4 eye drops to [facility]. tomorrow morning once patient has a room assigned.     The information provided in this note is only as accurate as the sources available at the time of the update(s).    Thank you for the opportunity to participate in the care of this patient.    Katie Rodríguez [facility]  2/17/2022 9:53 PM

## 2022-02-18 NOTE — PLAN OF CARE
PRIMARY DIAGNOSIS: ACUTE PAIN  OUTPATIENT/OBSERVATION GOALS TO BE MET BEFORE DISCHARGE:  1. Pain Status: Improved-controlled with oral pain medications.    2. Return to near baseline physical activity: No    3. Cleared for discharge by consultants (if involved): No    Discharge Planner Nurse   Safe discharge environment identified: No  Barriers to discharge: Yes       Entered by: Eugenio Mar 02/18/2022 1:56 PM     Please review provider order for any additional goals.   Nurse to notify provider when observation goals have been met and patient is ready for discharge.Goal Outcome Evaluation:

## 2022-02-18 NOTE — CONSULTS
NEUROSURGERY CONSULTATION NOTE    Amelia Fry   [address]  [place] [address]  81 year old female  Admission Date/Time: 2/17/2022  5:59 PM  Primary Care Provider: Roland Henriquez [facility] Attending Physician: Adriana Virgen MD    Neurosurgery was asked to see this patient by Adriana Virgen MD for evaluation of C3 pathologic fracture in the setting of multiple myeloma. .     PROBLEM LIST:  Active Problems:    Chest mass    SOB (shortness of breath)    Pathological fracture of thoracic vertebra, initial encounter    Closed fracture of multiple ribs, unspecified laterality, initial encounter    Pathological fracture of cervical vertebra, initial encounter       Neurosurgery Attending: The patient's clinical examination, laboratory data, and plan was discussed with Dr. Bedoya.     CONSULTATION ASSESSMENT AND PLAN:  81 year old female with hx of multiple myeloma presents after a fall in her bathroom one week ago, shortness of breath - she was found to have C3 pathologic fractures, chest wall mass and rib fractures. CT cervical spine with presumed lesion in the body of C3 with superior endplate deformity. Oncology notes reviewed, they are planning on hospice discussion this afternoon. Would recommend collar for this fracture, no MRI at this point because would not . . IF patient decided to forgo collar for comfort focused care, no neurosurgery follow up.        Addendum: Notes reviewed, note plan for discussion with hospice care. Cancelled cervical x-rays. Can wear collar if patient wishes for comfort. We will sign off.     Plan:  1. Orthotist consult for miami J  2. No MRI  3. Collar on at all times  4. Can get up with aspen until Gordon J arrives.   5. X-rays with miami J in place       HPI:  81 year old female with hx of multiple myeloma, multiple pathologic fractures presents after a fall in her bathroom a week ago, shortness of breath. She had Covid in January 2022  and since has been recovering slowly. She lives with her twin sister who offers her support. Last week she went to use the restroom, she is legally blind so she could not see the handrails and reached for the left handrail, missed and fell. She had buttock pain at the time, no neck pain. Given her trauma, she was pan scanned and found to have new appearing C3 fracture, old chronic thoracic and lumbar fractures. Her son was at the bedside and is in agreement with the plan. No new neuro deficits. She does not have pain or point tenderness associated with cervical fracture.   Past Medical History:   Diagnosis Date     Chronic back pain      COVID-19 January 2022     Hypertension      Legal blindness      Multiple myeloma (H)      Past Surgical History:   Procedure Laterality Date     EYE SURGERY         REVIEW OF SYSTEMS:   12 point review of systems is negative apart from HPI     MEDICATIONS:  No current outpatient medications on file.         ALLERGIES/SENSITIVITIES:     Allergies   Allergen Reactions     Codeine Anaphylaxis     Chest pressure, shortness of breath     Fentanyl Anaphylaxis     Bee Venom Swelling       PERTINENT SOCIAL HISTORY: reviewed, lives with twin sister   Social History     Socioeconomic History     Marital status:      Spouse name: None     Number of children: None     Years of education: None     Highest education level: None   Occupational History     None   Tobacco Use     Smoking status: Never Smoker     Smokeless tobacco: Never Used   Substance and Sexual Activity     Alcohol use: Yes     Comment: Alcoholic Drinks/day: a few glasses of wine a month     Drug use: No     Sexual activity: None   Other Topics Concern     None   Social History Narrative     None     Social Determinants of Health     Financial Resource Strain: Not on file   Food Insecurity: Not on file   Transportation Needs: Not on file   Physical Activity: Not on file   Stress: Not on file   Social Connections: Not  "on file   Intimate Partner Violence: Not on file   Housing Stability: Not on file         FAMILY HISTORY:  Family History   Problem Relation Age of Onset     Coronary Artery Disease No family hx of      Diabetes No family hx of         PHYSICAL EXAM:   Constitutional: /60 (BP Location: Left arm)   Pulse 86   Temp 98.4  F (36.9  C) (Oral)   Resp 16   Ht 1.651 m (5' 5\")   Wt 47.2 kg (104 lb)   SpO2 92%   BMI 17.31 kg/m       Mental Status: A & O in no acute distress.  Affect is appropriate.  Speech is fluent.  Recent and remote memory are intact.  Attention span and concentration are normal.     Cranial Nerves: CN1: grossly intact per patient recall. CN2: No funduscopic exam performed. CN3,4 & 6: Pupillary light response, lateral and vertical gaze normal.  No nystagmus.  Visual fields are full to confrontation. CN5: Intact to touch CN7: No facial weakness, smile, facial symmetry intact. CN8: Intact to spoken voice. CN9&10: Gag reflex, uvula midline, palate rises with phonation. CN11: Shoulder shrug 5/5 intact bilaterally. CN12: Tongue midline and moves freely from side to side.     Motor: No pronator drift of upper extremity. Normal bulk and tone all muscle groups of upper and lower extremities.    Strength: 5/5 in all extremities   Sensory: Sensation intact bilaterally to light touch      IMAGING:  I personally reviewed all radiographic images     CT cervical, thoracic and lumbar reviewed  2/17 - CT cervical spine with very mild superior endplate compression fracture of C3.     (non critical care) I spent more than 60 minutes in this apt, examining the pt, reviewing the scans, reviewing notes from chart, discussing treatment options with risks and benefits and coordinating care. >50 % time was spent in face to face counseling and coordinating care    Nalini Sanchez NP      "

## 2022-02-18 NOTE — CONSULTS
[facility] - Palliative Care Consultation Note    Patient: Amelia Fry  Date of Admission:  2/17/2022    Requesting Clinician / Team: Dr Puentes  Reason for consult: Goals of Care (hx of progressive MM, fractures, mass, goals of care)     Summary/Recommendations:    Goals of care  Met with pt and her son this afternoon after they had just met with [place] [facility] provider Shauna Stevenson PA-C, who reviewed pts multiple myeloma status and has not advised further oncology treatment (Luna did have chemo last week, but did not have it for ~6 weeks, early Jan. to mid Feb., 2nd COVID). Luna is understanding and accepting of this. Aware hospice has been consulted. Oncology SIENA appreciated palliative support today with emotional processing of end of life stage. Did spend time talking about hospice and decisions she would make if she chooses it (no more oncology treatments, generally wishing to be out of the hospital) as well as the hospice team, items covered, close monitoring of symptoms, as well as at times, helping families hire additional help if needed (at home). She wants to be at home for end of life care. She wishes she could get home even today, or tomorrow. She is familiar with [facility] and [facility] but wants to be at home on hospice. She lives in the same home as her twin sister Kate. Son Nir lives in [place] but will be around. She wants to be able to participate in activities, go to [employer] study, go out for lunch, while able.     She does appropriately ask if she can continue with her eye medications (? will hospice cover this; appreciate hospice reviewing with her).   She wonders if she can have the hospice meeting at her home ? tomorrow. She does want her son and daughter there.     If she took a turn for the worse while here, no escalation of care to the ICU, focus on comfort.     Spoke with CM re: pts desire to discharge and enroll in a hospice soon (unsure how soon [facility] will be able to see and  enroll; pt/family aware of choices/options; appreciate CM being aware and helping facilitate connection to another hospice if needed.     Advanced care planning  -Previous Healthcare Directive: Has an advanced directive on file from 8/9/21.   -Surrogate Medical Decision Maker: Son Nir and then sister Kate  -CODE STATUS: DNR/DNI (confirms this today).    Symptom management  Not involved in managing pts systems (pain team involved, appreciated). Pt has had a hx of dysphagia evaluated during recent admission last month. Is able to take the long acting PO Oxycontin ok per her and her son. She does have some other medications crushed or in liquid format. Enrolling in hospice.     Psychosocial and support needs  -Is Kevin. She will reach out to her Claudia Gomez     Case discussed with Oncology SIENA, CM.      Thank you for the opportunity to participate in the care of this patient and family.      During regular M-F work hours -- if you are not sure who specifically to contact -- please contact us by calling [phone].      Palliative Care Assessment    Information gathered today from: chart review, discussion with Oncology SIENA.  Amelia Fry is a 81 year old female with a history of multiple myeloma, severe protein calorie malnutrition, hypothyroidism, and drug-induced polyneuropathy, who presented to the ED on Feb. 17th from the [facility] with shortness of breath and weakness.  Admitted to the hospital with shortness of breath and weakness.  Previous hospitalizations: 3 hospitalizations in the last 6 months.   Other specialities following this admission: Hematology/Oncology, Neurosurgery, Acute Pain Team  Recent changes: Per ER, chief complaint of worsening SOB. COVID over 2 weeks ago. Fell 5 days ago. Worse back pain since fall.   At cancer [facility], could not get out of care. Sent here.     Today, the patient was seen for:  Goals of Care (hx of progressive MM, fractures, mass, goals of care)    Previous  Palliative Care Encounters:  Yes, during recent January admission.    Oncology note from today:  CT imaging showing progression of disease to right chest wall and C3 spine.  PLAN:   - appreciate ortho/spine vs Neurosx assistance in bracing requirements to stabilize patient  - no further treatment for Multiple Myeloma: discussed with patient, son and sister Kate  - Cristofer IP with Hospice   - Greatly appreciate Palliative Care support in meanwhile  - OK to discharge from oncology standpoint likely with hospice at home. She may wear brace if she wishes for pain management  Nir, son and patient understand that disease has progressed quickly and no further treatment recommended for multiple myeloma as it would likely cause more harm than good. They are appropriately tearful, but understand. Luna wants to go home and be with loved ones. She and Nir are aware to call me with any concerns in their transition.     Recent Neurosurgery Note:  Discussed clinical exam and images with Dr Piña. Patient fell a week ago. Arrives to the ED with SOB of which she will be admitted. Currently being followed by Oncology for multiple myeloma. Treatments on hold? Favored to be new C3 superior endplate pathological fracture on CT scan. Many chronic fractures thoracic and lumbar spine. Lumbar date back to at least MRI 9/2021. Thoracic fractures noted on CT chest 1/2022 without any prior images. Not clear if patient's fractures have been evaluated previously and/or braced. She is neurologically intact. Endorses chronic back pain. Covid 2 weeks ago.   Recommend MRI cervical spine with and without contrast. C Collar at all times.   Attending: Dr Bedoya      Summary of Palliative Encounter:  I  discussed the reason for Palliative Care Referral and our role in symptom management, patient family communication and understanding their choices for medical treatment and providing  guidance in making difficult decisions in the framework of  "focusing on patient comfort and quality of life.        Prognosis, Goals, and/or Advance Care Planning were addressed today: Yes    Mood, coping, and/or meaning in the context of serious illness were addressed today: Yes    Functional Status:    CM note from this AM: Patient stats that she lives with her twin sister Kate in a house. Her sister's daughter Adrianna also lives next door.  Patient has her own living space and is mostly independent with activities of daily living but her sister helps as needed as patient is blind in her left eye and is legally blind on the right (no useful vision, per patient). Her sister provides transportation.  Patient anticipates returning home with sister, stating, \"my sister takes really good care of me\". She notes that she does NOT want to go to [facility]. Her son lives in [place] and is supportive also.     Prognosis, Goals, & Planning:   Prognosis (quality & quantity): Stopping oncology treatments now  High risk of death within one year? Yes    Patient's decision making preferences: With family and medical staff.         Capacity evaluation:    Pt Amelia does have capacity to make a decision regarding  based on the following:    Ability to understand relevant information about her condition.    Ability to demonstrate understanding of her illness and care needs.    Ability to demonstrate reasoning to make decisions regarding her illness.    Ability to communicate her options for treatment.            I have concerns about the patient/family's health literacy today: No           Patient has a completed Health Care Directive:       Code status: DNR/DNI    Social:     Relationships: . Has a son Nir. Lives with her twinsavi Love who is her primary caregiver. Of note, Nir also was around and recently helped take care of Luna when she had COVID.     Spirituality: Kevin      Key Palliative Symptom Data:  SOB-a little (recent COVID), has some mild congestion  Pain-Rates now at 3 " "(was higher with activity)  Nausea-No  Anxiety-Seems to be doing well. Has a strong donny, very close family.     Patient is on opioids: Yes    ROS:  Comprehensive ROS is reviewed and is negative except as here & per HPI:      Past Medical History:  Past Medical History:   Diagnosis Date     Chronic back pain      COVID-19     January 2022     Hypertension      Legal blindness      Multiple myeloma (H)         Past Surgical History:  Past Surgical History:   Procedure Laterality Date     EYE SURGERY           Family History:  Family History   Problem Relation Age of Onset     Coronary Artery Disease No family hx of      Diabetes No family hx of         Allergies:  Allergies   Allergen Reactions     Codeine Anaphylaxis     Chest pressure, shortness of breath     Fentanyl Anaphylaxis     Bee Venom Swelling        Medications:  I have reviewed this patient's medication profile and medications from this hospitalization.       aspirin  81 mg Oral Daily     brimonidine  1 drop Right Eye BID     levothyroxine  100 mcg Oral QAM AC     magnesium sulfate  2 g Intravenous Once     netarsudil  1 drop Right Eye At Bedtime     oxyCODONE  30 mg Oral Q12H     potassium chloride  40 mEq Oral Once     senna-docusate  1 tablet Oral BID    Or     senna-docusate  2 tablet Oral BID     sodium chloride (PF)  3 mL Intracatheter Q8H        PRN MEDS:   acetaminophen, lidocaine 4%, lidocaine (buffered or not buffered), melatonin, naloxone **OR** naloxone **OR** naloxone **OR** naloxone, oxyCODONE, polyethylene glycol, sodium chloride (PF)     Morphine Equivalent Daily Dose: Oxycodone 30 mg = 45 mg MME    Physical Exam:  /61 (BP Location: Left arm)   Pulse 89   Temp 97.7  F (36.5  C) (Oral)   Resp 16   Ht 1.651 m (5' 5\")   Wt 47.2 kg (104 lb)   SpO2 96%   BMI 17.31 kg/m      General Appearance: Pt is alert, conversant, in NAD.     Data reviewed:    Data   EXAM: CT CHEST PULMONARY EMBOLISM W CONTRAST  LOCATION: [facility]" [facility]  DATE/TIME: 2/17/2022 7:48 PM     INDICATION: Shortness of breath. Multiple myeloma.  COMPARISON: Chest CTA 01/17/2022  TECHNIQUE: CT chest pulmonary angiogram during arterial phase injection of IV contrast. Multiplanar reformats and MIP reconstructions were performed. Dose reduction techniques were used.   CONTRAST: isovue 370 100ml     FINDINGS:  ANGIOGRAM CHEST: Excellent opacification of pulmonary arteries and branches. No evidence of pulmonary emboli. Thoracic aorta is slightly ectatic at 3.8 cm, unchanged.     LUNGS AND PLEURA: Mild dependent atelectasis. No effusions or suspicious lesions.     MEDIASTINUM/AXILLAE: No adenopathy.     CORONARY ARTERY CALCIFICATION: Cannot evaluate.     UPPER ABDOMEN: Normal.     MUSCULOSKELETAL: Bones are extremely demineralized with diffuse lytic lesions throughout. There are multiple ribs with areas of cortical breakthrough and large expansile lytic mass is seen again involving the anterior right fourth rib. Interval callus   formation about one of the pathological fractures laterally on the left  at T7 on and on the right at T5 anteriorly. Multiple thoracic vertebral body compression fractures are again noted with vertebral plana at T9, unchanged. Chronic, upper sternal   fracture noted.                                                                      IMPRESSION:  1.  Diffuse myelomatous change in the skeleton including with multiple vertebral body compression fractures and multiple rib fractures with large expansile mass anteriorly on the right at rib 4. Interval callus formation about some of these pathological   fractures since the study done a month ago. There are multiple others areas of rib destruction which likely explains her chest pain.  2.  No evidence of pulmonary emboli.      Lab Results   Component Value Date/Time    ALBUMIN 3.1 (L) 02/17/2022 06:10 PM     Wt Readings from Last 3 Encounters:   02/17/22 47.2 kg (104 lb)   01/20/22 49.4 kg (109  lb)   01/12/22 51.7 kg (114 lb)         TTS: I have personally spent a total of 80 minutes  today on the unit in review of medical record, consultation with the medical providers and assessment of patient today, with more than 50% of this time spent in counseling, coordination of care, and conversation with Oncology SIENA, son, and pt re: clinical status, recent changes, diagnostic results, prognosis, symptom management, emotional support, risks and benefits of management options, and development of plan of care (home with hospice as soon as able).     DIAMOND Chery, FNP-BC, PMHNP-BC  Palliative Care Nurse Practitioner  [facility]  [phone]      During regular M-F work hours -- if you are not sure who specifically to contact -- please contact us by calling [phone].

## 2022-02-18 NOTE — CONSULTS
Consultation    Amelia Fry MRN# [id number]   YOB: 1940 Age: 81 year old   Date of Admission: 2/17/2022  Requesting physician:   Reason for consult:  Dhaval           Assessment and Plan:     1. History of IgG multiple myeloma: Lambda light chains:  The patient was on maintenance Revlimid 20 mg daily 3 weeks on and 1 week off. Since her her IgG and lambda light chain were increasing, we stopped treatment with Revlimid on 7/14/21. Her Zometa was held in August of 2019 secondary to jaw pain and necrosis.   The patient initiated treatment with SQ daratumumab + Velcade on 9/2/21. Dexamethasone was held as she has increased risk of side effects on steroids because of glaucoma. Velcade is given on days 1, 8, and daratumumab is given on days 1, 8, and 15 of a 3-week cycle.   - Patient last received cycle 7-day 1 on 2/10/2022 with bortezomib 1 mg/m  plus subcutaneous daratumumab 1800 mg.  - Dose held on 2/17/2022 due to acute findings on clinic evaluation.    CT imaging showing progression of disease to right chest wall and C3 spine.    PLAN:   - appreciate ortho/spine vs Neurosx assistance in bracing requirements to stabilize patient  - no further treatment for Multiple Myeloma: discussed with patient, son and sister Kate FUENTES with Hospice   - Greatly appreciate Palliative Care support in meanwhile  - OK to discharge from oncology standpoint likely with hospice at home. She may wear brace if she wishes for pain management    2. Severe Malnutrition:   - patient will see RD at our clinic if she remains observation status    Nir, son and patient understand that disease has progressed quickly and no further treatment recommended for multiple myeloma as it would likely cause more harm than good. They are appropriately tearful, but understand. Luna wants to go home and be with loved ones. She and Nir are aware to call me with any concerns in their transition.     I will call Kate to  update    Shauna Stevenson  [facility]  Office: [phone]  Cell: [phone] Monday through Friday, 8AM-5PM. After hours and weekends, please use answering service.              Chief Complaint:   Shortness of Breath           History of Present Illness:   This patient is a 81 year old female with history of multiple myeloma currently on treatment of daratumumab plus bortezomib given weekly.     Last treated with cycle 7-day 1 of Pertuzumab plus daratumumab on 2/10/2022. Patient presented to clinic 2/17/2022 for next treatment however had significantly declined since noon that day. Patient had been suffering from fevers up to 102 and had taken Tylenol and in clinic was still at 101. She had rapid heart rate and slightly altered mental status. Patient also described having more difficulty breathing. She did fall in the bathroom however did not states she hit anything instead landed harder on her butt.      She initially presented here in 2013 and stated that she had some pain which started in May of this year in the low back, and gradually over the last few months, it has increased, and the pain, and also going up her back. She does report lifting something heavy that really made the pain worse and exacerbated the pain. The patient has had workup and has had physical therapy, and it was noted that she possibly had a monoclonal component in a urinary protein electrophoresis. Overall, her thoracic spine showed loss of vertebral body height, indeterminate-intensity with possible marrow replacement. In the lumbar spine, there was a focus of marrow replacement within the visualized sacrum at S2-S3 segments with mild osseous expansion, additional indeterminate foci of marrow replacement seen at L5 and within the posterior left iliac bone. These were all indeterminate in nature, but suspicious for osseous metastasis. Compression fracture L1-L4 suggesting acute compression fractures. No definite marrow  replacement and retropulsion at L4 vertebral body contributes to mild amount of spinal canal stenosis.    Her MRI of lumbar spine on 7/8/21 showed diffuse marrow signal abnormality/ soft tissue type infiltration within the sacrum starting at S2 and extending inferiorly consistent with plasmacytoma. Moderate compression fracture deformities T12, L1, and L4 with no associated soft tissue mass or neural compromise. Fractures late subacute to early chronic at L1 and chronic at the other levels. Lumbar spondylosis/disc degeneration.    MRI of the thoracic spine on 7/8/21 showed Multilevel vertebral marrow signal abnormality consistent with underlying history of multiple myeloma and marrow infiltration. Severe vertebral plana type fracture at T9 with moderate fractures at T10 and L1, late subacute to chronic in appearance. More edematous changes associated with T7 and T8 fracture/ marrow infiltration with no significant height loss of these vertebral bodies.    The patient received for radiation therapy to the sacrum from 8/5/21 - 8/18/21.     The patient initiated treatment with SQ daratumumab + Velcade on 9/2/21. Dexamethasone was held as she has increased risk of side effects on steroids because of glaucoma. After C1D1, the patient went to the hospital on 9/2/21 for leg weakness. MRI of the lumbar spine on 9/2/21 showed slight progression of sacral marrow replacing process with encroachment upon the mid sacral plexus. There is also interval development of an adjacent pelvic soft tissue mass which is incompletely visualized seen on sagittal images 4-12 of series 5 and 6.    Chest CT on 9/14/21 showed numerous lytic lesions throughout the visualized bones consistent with provided history of multiple myeloma. Compression fractures of T2, T6, T7, T9, T10 and L1. These are all age indeterminate.    Hospitalization 1/12/22-1/13/22 for severe impaction which resolved followed by 1/17/22- 1/20/22 s/p Covid infection with  "pneumonia treated with antivirals and antibiotics.  Treatment held during this time.    2022 the patient presented to clinic for cycle 7-day 8 of Bortezomib plus daratumumab, treatment held this day secondary to acute findings. Patient suggested to be seen in the emergency department as she was suspected to be septic with tachycardia, fever, and increased shortness of breath.    CT imaging 22 showed new chest wall mass along with pathological C3 fracture-findings otherwise showing stable findings for prior pathological fractures throughout the skeleton.         Physical Exam:   Vitals were reviewed  Blood pressure 123/60, pulse 86, temperature 98.4  F (36.9  C), temperature source Oral, resp. rate 16, height 1.651 m (5' 5\"), weight 47.2 kg (104 lb), SpO2 92 %.  Temperatures:  Current - Temp: 98.4  F (36.9  C); Max - Temp  Av.6  F (37  C)  Min: 97.7  F (36.5  C)  Max: 99.9  F (37.7  C)  Respiration range: Resp  Av.2  Min: 16  Max: 22  Pulse range: Pulse  Av.7  Min: 86  Max: 120  Blood pressure range: Systolic (24hrs), Av , Min:122 , Max:149   ; Diastolic (24hrs), Av, Min:60, Max:75    Pulse oximetry range: SpO2  Av.5 %  Min: 88 %  Max: 97 %    Intake/Output Summary (Last 24 hours) at 2022 0901  Last data filed at 2022 0600  Gross per 24 hour   Intake 370 ml   Output --   Net 370 ml       GENERAL: No acute distress.  SKIN: No rashes or jaundice.  HEENT: Normocephalic, atraumatic. Eyes anicteric.   EXTREMITIES: No clubbing, cyanosis, or edema.  MENTAL: Alert and oriented to person, place, and time.  NEURO: Cranial nerves II through XII grossly intact with no focal motor or sensory deficits.              Past Medical History:   I have reviewed this patient's past medical history  Past Medical History:   Diagnosis Date     Chronic back pain      COVID-2022     Hypertension      Legal blindness      Multiple myeloma (H)              Past Surgical History: "   I have reviewed this patient's past surgical history  Past Surgical History:   Procedure Laterality Date     EYE SURGERY                 Social History:   I have reviewed this patient's social history  Social History     Tobacco Use     Smoking status: Never Smoker     Smokeless tobacco: Never Used   Substance Use Topics     Alcohol use: Yes     Comment: Alcoholic Drinks/day: a few glasses of wine a month             Family History:   I have reviewed this patient's family history  Family History   Problem Relation Age of Onset     Coronary Artery Disease No family hx of      Diabetes No family hx of              Allergies:     Allergies   Allergen Reactions     Codeine Anaphylaxis     Chest pressure, shortness of breath     Fentanyl Anaphylaxis     Bee Venom Swelling             Medications:   I have reviewed this patient's current medications  Medications Prior to Admission   Medication Sig Dispense Refill Last Dose     acetaminophen (TYLENOL) 325 MG tablet Take 650 mg by mouth every 8 hours as needed for fever or pain    Unknown at PRN     aspirin 81 mg chewable tablet Take 81 mg by mouth daily    2/17/2022 at AM     brimonidine (ALPHAGAN P) 0.1 % ophthalmic solution Place 1 drop into the right eye 2 times daily    2/17/2022 at AM     dorzolamide-timolol (COSOPT) 2-0.5 % ophthalmic solution Place 1 drop into the right eye 2 times daily   2/17/2022 at AM     levothyroxine (SYNTHROID, LEVOTHROID) 100 MCG tablet Take 100 mcg by mouth daily before breakfast    2/17/2022 at AM     loperamide (IMODIUM) 2 MG capsule Take 2 mg by mouth 4 times daily as needed for diarrhea    Unknown at PRN     MELALEUCA SC Take by mouth daily Melaleuca vitamin supplements containing - Multivitamin, minerals, calcium   2/17/2022 at AM     netarsudil (RHOPRESSA) 0.02 % ophthalmic solution Place 1 drop into the right eye At Bedtime   2/16/2022 at HS     ondansetron (ZOFRAN) 8 MG tablet Take 8 mg by mouth every 8 hours as needed for nausea    Unknown at PRN     oxyCODONE (ROXICODONE) 5 MG tablet Take 1-2 tablets by mouth every 4 hours as needed for pain   Unknown at PRN     OXYCONTIN 30 MG 12 hr tablet Take 30 mg by mouth every 12 hours   2/17/2022 at AM     sennosides (SENOKOT) 8.8 MG/5ML syrup Take 5 mLs by mouth 2 times daily (Patient taking differently: Take 10 mLs by mouth At Bedtime ) 236 mL 0 2/16/2022 at HS     tafluprost, PF, (ZIOPTAN, PF,) 0.0015 % Dpet ophthalmic dropperette Place 1 drop into the right eye At Bedtime    2/16/2022 at HS     cholecalciferol 25 MCG (1000 UT) TABS Take 1,000 Units by mouth daily HOLD UNTIL EGD IS DONE (Patient not taking: Reported on 2/17/2022)   Not Taking at Unknown time     gabapentin (NEURONTIN) 300 MG capsule Take 1 capsule (300 mg) by mouth every evening HOLD UNTIL EGD IS DONE (Patient not taking: Reported on 2/17/2022)   Not Taking at Unknown time     vitamin E (TOCOPHEROL) 400 units (180 mg) capsule Take 1 capsule (400 Units) by mouth daily HOLD UNTIL YOU HAVE EGD DONE (Patient not taking: Reported on 2/17/2022)   Not Taking at Unknown time     Current Facility-Administered Medications Ordered in Epic   Medication Dose Route Frequency Last Rate Last Admin     acetaminophen (TYLENOL) tablet 650 mg  650 mg Oral Q8H PRN         aspirin (ASA) chewable tablet 81 mg  81 mg Oral Daily   81 mg at 02/18/22 0822     brimonidine (ALPHAGAN P) 0.1 % ophthalmic solution 1 drop  1 drop Right Eye BID   1 drop at 02/18/22 0845     levothyroxine (SYNTHROID/LEVOTHROID) tablet 100 mcg  100 mcg Oral QAM AC   100 mcg at 02/18/22 0613     lidocaine (LMX4) cream   Topical Q1H PRN         lidocaine 1 % 0.1-1 mL  0.1-1 mL Other Q1H PRN         magnesium sulfate 2 g in water intermittent infusion  2 g Intravenous Once   2 g at 02/18/22 0824     melatonin tablet 1 mg  1 mg Oral At Bedtime PRN         naloxone (NARCAN) injection 0.2 mg  0.2 mg Intravenous Q2 Min PRN        Or     naloxone (NARCAN) injection 0.4 mg  0.4 mg Intravenous  Q2 Min PRN        Or     naloxone (NARCAN) injection 0.2 mg  0.2 mg Intramuscular Q2 Min PRN        Or     naloxone (NARCAN) injection 0.4 mg  0.4 mg Intramuscular Q2 Min PRN         netarsudil (RHOPRESSA) 0.02 % ophthalmic solution 1 drop  1 drop Right Eye At Bedtime         oxyCODONE (OxyCONTIN) 12 hr tablet 30 mg  30 mg Oral Q12H   30 mg at 02/18/22 0140     oxyCODONE (ROXICODONE) tablet 5-10 mg  5-10 mg Oral Q4H PRN         polyethylene glycol (MIRALAX) Packet 17 g  17 g Oral Daily PRN         senna-docusate (SENOKOT-S/PERICOLACE) 8.6-50 MG per tablet 1 tablet  1 tablet Oral BID        Or     senna-docusate (SENOKOT-S/PERICOLACE) 8.6-50 MG per tablet 2 tablet  2 tablet Oral BID   2 tablet at 02/18/22 0822     sodium chloride (PF) 0.9% PF flush 3 mL  3 mL Intracatheter Q8H   3 mL at 02/18/22 0847     sodium chloride (PF) 0.9% PF flush 3 mL  3 mL Intracatheter q1 min prn         sodium chloride 0.9% infusion   Intravenous Continuous 50 mL/hr at 02/18/22 0147 New Bag at 02/18/22 0147     No current [facility]-ordered outpatient medications on file.             Review of Systems:     The 10 point Review of Systems is negative other than noted in the HPI.            Data:   Data   Results for orders placed or performed during the hospital encounter of 02/17/22 (from the past 24 hour(s))   Troponin I (now)   Result Value Ref Range    Troponin I 0.02 0.00 - 0.29 ng/mL   Comprehensive metabolic panel   Result Value Ref Range    Sodium 131 (L) 136 - 145 mmol/L    Potassium 3.5 3.5 - 5.0 mmol/L    Chloride 103 98 - 107 mmol/L    Carbon Dioxide (CO2) 21 (L) 22 - 31 mmol/L    Anion Gap 7 5 - 18 mmol/L    Urea Nitrogen 18 8 - 28 mg/dL    Creatinine 0.70 0.60 - 1.10 mg/dL    Calcium 9.2 8.5 - 10.5 mg/dL    Glucose 141 (H) 70 - 125 mg/dL    Alkaline Phosphatase 81 45 - 120 U/L    AST 29 0 - 40 U/L    ALT 11 0 - 45 U/L    Protein Total 9.8 (H) 6.0 - 8.0 g/dL    Albumin 3.1 (L) 3.5 - 5.0 g/dL    Bilirubin Total 0.5 0.0 - 1.0 mg/dL     GFR Estimate 87 >60 mL/min/1.73m2   CBC with platelets + differential    Narrative    The following orders were created for panel order CBC with platelets + differential.  Procedure                               Abnormality         Status                     ---------                               -----------         ------                     CBC with platelets and d...[[id number]]  Abnormal            Final result                 Please view results for these tests on the individual orders.   B-Type Natriuretic Peptide ([facility] Only)   Result Value Ref Range    BNP 83 0 - 155 pg/mL   CBC with platelets and differential   Result Value Ref Range    WBC Count 7.3 4.0 - 11.0 10e3/uL    RBC Count 3.57 (L) 3.80 - 5.20 10e6/uL    Hemoglobin 11.5 (L) 11.7 - 15.7 g/dL    Hematocrit 35.4 35.0 - 47.0 %    MCV 99 78 - 100 fL    MCH 32.2 26.5 - 33.0 pg    MCHC 32.5 31.5 - 36.5 g/dL    RDW 14.7 10.0 - 15.0 %    Platelet Count 201 150 - 450 10e3/uL    % Neutrophils 62 %    % Lymphocytes 25 %    % Monocytes 13 %    % Eosinophils 0 %    % Basophils 0 %    % Immature Granulocytes 0 %    NRBCs per 100 WBC 0 <1 /100    Absolute Neutrophils 4.5 1.6 - 8.3 10e3/uL    Absolute Lymphocytes 1.8 0.8 - 5.3 10e3/uL    Absolute Monocytes 0.9 0.0 - 1.3 10e3/uL    Absolute Eosinophils 0.0 0.0 - 0.7 10e3/uL    Absolute Basophils 0.0 0.0 - 0.2 10e3/uL    Absolute Immature Granulocytes 0.0 <=0.4 10e3/uL    Absolute NRBCs 0.0 10e3/uL   Magnesium   Result Value Ref Range    Magnesium 1.7 (L) 1.8 - 2.6 mg/dL   Head CT w/o contrast    Addendum: 2/17/2022    Addendum/  impression:    There are a few lucent lesions throughout the calvarium, compatible the patient's reported history of myeloma.      Narrative    EXAM: CT HEAD W/O CONTRAST  LOCATION: [facility]  DATE/TIME: 2/17/2022 7:46 PM    INDICATION: Fall, weakness  COMPARISON: None.  TECHNIQUE: Routine CT Head without IV contrast. Multiplanar reformats. Dose reduction  techniques were used.    FINDINGS:  INTRACRANIAL CONTENTS: No evidence of acute intracranial hemorrhage or mass effect. Scattered foci of decreased attenuation within the cerebral hemispheric white matter which are nonspecific, though most commonly ascribed to chronic small vessel ischemic   disease. The ventricles and sulci are prominent consistent with mild brain parenchymal volume loss. Gray-white matter differentiation is maintained. The basilar cisterns are patent.    VISUALIZED ORBITS/SINUSES/MASTOIDS: Bilateral cataract surgery. Right scleral banding. Postsurgical changes left globe. The partially imaged paranasal sinuses and mastoid air cells are unremarkable.     BONES/SOFT TISSUES: The visualized skull base and calvarium are unremarkable.      Impression    IMPRESSION:    1.  No evidence of acute intracranial hemorrhage or mass effect.  2.  Mild nonspecific white matter changes.  3.  Mild brain parenchymal volume loss.   Cervical spine CT w/o contrast    Addendum: 2/17/2022    Addendum/  impression:    Dr. Leonard discussed the results with Dr. Piña on 2/17/2022 9:21 PM by telephone.      Narrative    EXAM: CT CERVICAL SPINE W/O CONTRAST  LOCATION: [facility]  DATE/TIME: 2/17/2022 7:47 PM    INDICATION: Back pain  COMPARISON: Cervical spine MRI 12/08/2017  TECHNIQUE: Routine CT Cervical Spine without IV contrast. Multiplanar reformats. Dose reduction techniques were used.    FINDINGS:  Pathologic fracture involving the superior plate of C3, new since cervical spine MRI 12/08/2017, likely acute. Chronic pathologic T2 compression fracture which was present on prior cervical spine MRI 12/08/2017. Subtle chronic superior endplate T1   compression fracture, unchanged from 12/18/2017. No evidence of acute fracture or subluxation of the cervical spine by CT imaging. The vertebral bodies of the cervical spine otherwise have normal stature and alignment. Multilevel degenerative disc    disease of the cervical spine with disc height loss, most pronounced at C5/C6. The partially imaged intracranial contents are unremarkable. No prevertebral soft tissue swelling. The partially imaged lung apices are unremarkable.     Craniovertebral junction and C1-C2: The odontoid process is well approximated with the anterior body of C1 and well aligned between the lateral masses of C1.    C2-C3: No posterior disc bulge or spinal canal narrowing. No neural foraminal narrowing.     C3-C4: No posterior disc bulge or spinal canal narrowing. Uncovertebral joint disease and facet arthropathy with moderate right neural foraminal narrowing. No left neural foraminal narrowing.     C4-C5: No posterior disc bulge or spinal canal narrowing. Uncovertebral joint disease and facet arthropathy with mild right neural foraminal narrowing. No left neural foraminal narrowing.     C5-C6: No posterior disc bulge or spinal canal narrowing. Uncovertebral joint disease and facet arthropathy with severe right and mild left neural foraminal narrowing.     C6-C7: No posterior disc bulge or spinal canal narrowing. No neural foraminal narrowing.     C7-T1: No posterior disc bulge or spinal canal narrowing. No neural foraminal narrowing.       Impression    IMPRESSION:  1.  Pathologic fracture involving the superior plate of C3, new since cervical spine MRI 12/08/2017, likely acute.  2.  Lucent  lesions throughout the cervical and partially imaged thoracic spine, compatible with the patient's reported history of myeloma.   3.  No other evidence of acute fracture or subluxation of the cervical spine by CT imaging.  4.  Chronic pathologic T2 compression fracture which was present on prior cervical spine MRI 12/08/2017.    CT Thoracic Spine w/o Contrast    Narrative    EXAM: CT THORACIC SPINE W/O CONTRAST, CT LUMBAR SPINE W/O CONTRAST  LOCATION: [facility]  DATE/TIME: 2/17/2022 7:47 PM    INDICATION: Mid-back pain,  compression fracture suspected  COMPARISON: Cervical spine CT performed same day. Cervical spine MRI 12/08/2017. Chest CT 01/17/2022  TECHNIQUE: Routine CT Thoracic and Lumbar Spine without IV contrast. Multiplanar reformats. Dose reduction techniques were used.     FINDINGS:  Thoracic spine CT:  Chronic T2 pathologic fracture, unchanged from cervical spine MRI 12/08/2017. Lucent lesions throughout the thoracic spine compatible with the patient's reported history of multiple myeloma. Pathologic compression fractures at T4 T6, T7, T8 T9 and T10,   and T12 unchanged from 01/17/2022. Chronic superior endplate T12 compression fracture, unchanged. No definite evidence of new acute fracture or subluxation of the thoracic spine by CT imaging., However given the patient's extensive myeloma, if there is   concern for acute fracture, MR would be more sensitive.    The partially imaged lungs are unremarkable.    Lumbar spine CT:  Lucent lesions throughout the lumbar spine compatible with the patient's reported history of myeloma, marrow replacing process with the S2 vertebral body and posterior elements, unchanged. Superior endplate L2 compression fracture,, unchanged since chest   CT 01/17/2022, though new since lumbar spine MRI 09/02/2021, age-indeterminate. Chronic L1, L3 and L4 compression fractures, unchanged. No other definite evidence of acute fracture or subluxation of the lumbar spine by CT imaging.    T12/L1:  Symmetric disc bulge without spinal canal narrowing. No neural foraminal narrowing.    L1/L2:  Symmetric disc bulge and mild facet arthropathy without spinal canal narrowing. No neural foraminal narrowing.    L2/L3:  Symmetric disc bulge without spinal canal narrowing. Mild bilateral neural foraminal narrowing.    L3/L4:  Symmetric disc bulge and moderate facet arthropathy without spinal canal narrowing. No neural foraminal narrowing.      L4/L5:  Symmetric disc bulge and moderate facet arthropathy without  spinal canal narrowing. Moderate to severe right and moderate left neural foraminal narrowing.     L5/S1: Symmetric disc bulge and moderate facet arthropathy without spinal canal narrowing. No neural foraminal narrowing.       Impression    IMPRESSION:    Thoracic spine CT:  1.  Multiple lucencies lesions within the thoracic spine compatible with the patient's reported history of multiple myeloma.  2.  Multilevel compression fractures at T2, T4, T6-T9, T10, and T12, unchanged from 01/17/2022  3.  No definite evidence of new acute fracture or subluxation of the thoracic spine by CT imaging.    Lumbar spine CT:  1.  Superior endplate L2 compression fracture, unchanged since chest CT 01/17/2022, though new since lumbar spine MRI 09/02/2021, age-indeterminate.  2.  No other evidence of acute fracture or subluxation of the lumbar spine by CT imaging.  3.  Chronic L1, L3 and L4 compression fractures, unchanged.  4.  Marrow replacing process within the S2 vertebral body and posterior elements, unchanged.  5.  Degenerative lumbar spondylosis with level by level analysis as described above.   Lumbar spine CT w/o contrast    Narrative    EXAM: CT THORACIC SPINE W/O CONTRAST, CT LUMBAR SPINE W/O CONTRAST  LOCATION: [facility]  DATE/TIME: 2/17/2022 7:47 PM    INDICATION: Mid-back pain, compression fracture suspected  COMPARISON: Cervical spine CT performed same day. Cervical spine MRI 12/08/2017. Chest CT 01/17/2022  TECHNIQUE: Routine CT Thoracic and Lumbar Spine without IV contrast. Multiplanar reformats. Dose reduction techniques were used.     FINDINGS:  Thoracic spine CT:  Chronic T2 pathologic fracture, unchanged from cervical spine MRI 12/08/2017. Lucent lesions throughout the thoracic spine compatible with the patient's reported history of multiple myeloma. Pathologic compression fractures at T4 T6, T7, T8 T9 and T10,   and T12 unchanged from 01/17/2022. Chronic superior endplate T12 compression  fracture, unchanged. No definite evidence of new acute fracture or subluxation of the thoracic spine by CT imaging., However given the patient's extensive myeloma, if there is   concern for acute fracture, MR would be more sensitive.    The partially imaged lungs are unremarkable.    Lumbar spine CT:  Lucent lesions throughout the lumbar spine compatible with the patient's reported history of myeloma, marrow replacing process with the S2 vertebral body and posterior elements, unchanged. Superior endplate L2 compression fracture,, unchanged since chest   CT 01/17/2022, though new since lumbar spine MRI 09/02/2021, age-indeterminate. Chronic L1, L3 and L4 compression fractures, unchanged. No other definite evidence of acute fracture or subluxation of the lumbar spine by CT imaging.    T12/L1:  Symmetric disc bulge without spinal canal narrowing. No neural foraminal narrowing.    L1/L2:  Symmetric disc bulge and mild facet arthropathy without spinal canal narrowing. No neural foraminal narrowing.    L2/L3:  Symmetric disc bulge without spinal canal narrowing. Mild bilateral neural foraminal narrowing.    L3/L4:  Symmetric disc bulge and moderate facet arthropathy without spinal canal narrowing. No neural foraminal narrowing.      L4/L5:  Symmetric disc bulge and moderate facet arthropathy without spinal canal narrowing. Moderate to severe right and moderate left neural foraminal narrowing.     L5/S1: Symmetric disc bulge and moderate facet arthropathy without spinal canal narrowing. No neural foraminal narrowing.       Impression    IMPRESSION:    Thoracic spine CT:  1.  Multiple lucencies lesions within the thoracic spine compatible with the patient's reported history of multiple myeloma.  2.  Multilevel compression fractures at T2, T4, T6-T9, T10, and T12, unchanged from 01/17/2022  3.  No definite evidence of new acute fracture or subluxation of the thoracic spine by CT imaging.    Lumbar spine CT:  1.  Superior  endplate L2 compression fracture, unchanged since chest CT 01/17/2022, though new since lumbar spine MRI 09/02/2021, age-indeterminate.  2.  No other evidence of acute fracture or subluxation of the lumbar spine by CT imaging.  3.  Chronic L1, L3 and L4 compression fractures, unchanged.  4.  Marrow replacing process within the S2 vertebral body and posterior elements, unchanged.  5.  Degenerative lumbar spondylosis with level by level analysis as described above.   CT Chest Pulmonary Embolism w Contrast    Narrative    EXAM: CT CHEST PULMONARY EMBOLISM W CONTRAST  LOCATION: [facility]  DATE/TIME: 2/17/2022 7:48 PM    INDICATION: Shortness of breath. Multiple myeloma.  COMPARISON: Chest CTA 01/17/2022  TECHNIQUE: CT chest pulmonary angiogram during arterial phase injection of IV contrast. Multiplanar reformats and MIP reconstructions were performed. Dose reduction techniques were used.   CONTRAST: isovue 370 100ml    FINDINGS:  ANGIOGRAM CHEST: Excellent opacification of pulmonary arteries and branches. No evidence of pulmonary emboli. Thoracic aorta is slightly ectatic at 3.8 cm, unchanged.    LUNGS AND PLEURA: Mild dependent atelectasis. No effusions or suspicious lesions.    MEDIASTINUM/AXILLAE: No adenopathy.    CORONARY ARTERY CALCIFICATION: Cannot evaluate.    UPPER ABDOMEN: Normal.    MUSCULOSKELETAL: Bones are extremely demineralized with diffuse lytic lesions throughout. There are multiple ribs with areas of cortical breakthrough and large expansile lytic mass is seen again involving the anterior right fourth rib. Interval callus   formation about one of the pathological fractures laterally on the left  at T7 on and on the right at T5 anteriorly. Multiple thoracic vertebral body compression fractures are again noted with vertebral plana at T9, unchanged. Chronic, upper sternal   fracture noted.      Impression    IMPRESSION:  1.  Diffuse myelomatous change in the skeleton including  with multiple vertebral body compression fractures and multiple rib fractures with large expansile mass anteriorly on the right at rib 4. Interval callus formation about some of these pathological   fractures since the study done a month ago. There are multiple others areas of rib destruction which likely explains her chest pain.  2.  No evidence of pulmonary emboli.   Procalcitonin   Result Value Ref Range    Procalcitonin 0.17 0.00 - 0.49 ng/mL   Magnesium   Result Value Ref Range    Magnesium 1.7 (L) 1.8 - 2.6 mg/dL   Potassium   Result Value Ref Range    Potassium 3.0 (L) 3.5 - 5.0 mmol/L

## 2022-02-18 NOTE — PROGRESS NOTES
Discussed clinical exam and images with Dr Piña. Patient fell a week ago. Arrives to the ED with SOB of which she will be admitted. Currently being followed by Oncology for multiple myeloma. Treatments on hold? Favored to be new C3 superior endplate pathological fracture on CT scan. Many chronic fractures thoracic and lumbar spine. Lumbar date back to at least MRI 9/2021. Thoracic fractures noted on CT chest 1/2022 without any prior images. Not clear if patient's fractures have been evaluated previously and/or braced. She is neurologically intact. Endorses chronic back pain. Covid 2 weeks ago.     Recommend MRI cervical spine with and without contrast. C Collar at all times.     Attending: Dr Aureliano Ramos, AG-[facility]  O: [phone]

## 2022-02-18 NOTE — ED NOTES
"[facility] ED Handoff Report    ED Chief Complaint: SOB with exertion    ED Diagnosis:  (R06.02) SOB (shortness of breath)  Comment: chronic CA & treatment  Plan: Bedrest    (R22.2) Chest mass  Comment: concurrent CA  Plan: Bedrest due to possible FX    (M84.48XA) Pathological fracture of cervical vertebra, initial encounter  Comment:   Plan:     (M84.48XA) Pathological fracture of thoracic vertebra, initial encounter  Comment:   Plan:     (S22.49XA) Closed fracture of multiple ribs, unspecified laterality, initial encounter  Comment:  Plan:        PMH:    Past Medical History:   Diagnosis Date     Chronic back pain      COVID-19 January 2022     Hypertension      Legal blindness      Multiple myeloma (H)         Code Status:  Prior     Falls Risk: Yes Band: Applied    Current Living Situation/Residence: lives with their son or daughter     Elimination Status: Continent: Yes     Activity Level: Total assist/lift    Patients Preferred Language:  English     Needed: No    Vital Signs:  /60   Pulse 102   Temp 99.9  F (37.7  C) (Oral)   Resp 20   Ht 1.651 m (5' 5\")   Wt 47.2 kg (104 lb)   SpO2 92%   BMI 17.31 kg/m       Cardiac Rhythm: ST    Pain Score: 3/10    Is the Patient Confused:  Yes    Last Food or Drink: 02/17/22 at     Focused Assessment:      Tests Performed: Done: Labs and Imaging    Treatments Provided:  Meds, radiology, labs    Family Dynamics/Concerns: No    Family Updated On Visitor Policy: Yes    Plan of Care Communicated to Family: Yes    Who Was Updated about Plan of Care: son    Belongings Checklist Done and Signed by Patient: Yes    Medications sent with patient:     Covid: asymptomatic , negative    Additional Information:     RN: miranda  2/17/2022 11:01 PM       "

## 2022-02-18 NOTE — PLAN OF CARE
PRIMARY DIAGNOSIS: sob and cervical fx  OUTPATIENT/OBSERVATION GOALS TO BE MET BEFORE DISCHARGE:  ADLs back to baseline: No    Activity and level of assistance: strict bedrest order    Pain status: painful with movement. Denies pain at rest.    Return to near baseline physical activity: No     Discharge Planner Nurse   Safe discharge environment identified: No  Barriers to discharge:  monitoring       Entered by: Raine Escoto 02/18/2022 1:03 AM     Please review provider order for any additional goals.   Nurse to notify provider when observation goals have been met and patient is ready for discharge.Goal Outcome Evaluation:

## 2022-02-18 NOTE — CONSULTS
Care Management Initial Consult       Concerns to be Addressed:   Workup in progress: History significant for multiple myeloma, severe protein calorie malnutrition, hypothyroidism, and drug-induced polyneuropathy admitted on 2/17/2022 (due to shortness of breath), discharged to home on 2/20/2022. She is status post a fall 1 week ago. She was found to have a chest wall mass, closed fractures of multiple ribs, and a new C3 pathologic fracture.  Neurosurgery was consulted by the ED and placed in a hard collar.  Patient plan of care discussed at interdisciplinary rounds: Yes  Follow up from rounds/notes:   Per MD, likely will be having a hospice consult at some point.     Anticipated Discharge Disposition:  Discharge goal is home pending response to treatment, medical needs and mobility closer to discharge.      Anticipated Discharge Services:  To be determined by destination, patient/family preferences, medical needs and mobility status closer to the time of discharge.  Anticipated Discharge DME:  Per therapy (if indicated). Uses a cane for mobility at home.     Patient/family educated on Medicare website which has current facility and service quality ratings:  NA at this time.  Education Provided on the Discharge Plan:  Reviewed current observation status.   Patient/Family in Agreement with the Plan:  Yes    Referrals Placed by CM/SW:  None.  Private pay costs discussed: insurance costs ,reviewed observation status and [facility] brochure (copy left for patient's sister to review per patient's request).     Additional Information:  See below.    General Information  Assessment completed with: Luna Kinney  Type of CM/SW Visit: Initial Assessment    Primary Care Provider verified and updated as needed: Yes   Readmission within the last 30 days: current reason for admission unrelated to previous admission   Return Category:  (Currently here under OBS status.)  Reason for Consult: discharge planning  Advance Care Planning:  Advance Care Planning Reviewed: present on chart  Son Nir is HCA, sister Kate is first alternate.   General Information Comments: Lives with sister. Does not want TCU.    Communication Assessment  Patient's communication style: spoken language (English or Bilingual)    Hearing Difficulty or Deaf: no        Cognitive  Cognitive/Neuro/Behavioral: WDL                      Living Environment:   People in home: sibling(s)  Kate  Current living Arrangements: house      Able to return to prior arrangements: yes       Family/Social Support:  Care provided by:  (Sister)  Provides care for: no one, unable/limited ability to care for self  Marital Status:   Sibling(s)          Description of Support System:           Current Resources:   Patient receiving home care services: No     Community Resources:    Equipment currently used at home: cane, straight, shower chair  Supplies currently used at home:      Employment/Financial:  Employment Status: retired        Financial Concerns: No concerns identified   Referral to Financial Worker: No  Finance Comments: Has concerns about her bills from previous hospital stay. Directed her (or her sister) to call billing and [employer].     Lifestyle & Psychosocial Needs:  Social Determinants of Health     Tobacco Use: Low Risk      Smoking Tobacco Use: Never Smoker     Smokeless Tobacco Use: Never Used   Alcohol Use: Not on file   Financial Resource Strain: Not on file   Food Insecurity: Not on file   Transportation Needs: Not on file   Physical Activity: Not on file   Stress: Not on file   Social Connections: Not on file   Intimate Partner Violence: Not on file   Depression: Not on file   Housing Stability: Not on file       Functional Status:  Prior to admission patient needed assistance:   Dependent ADLs:: Ambulation-cane  Dependent IADLs:: Cleaning, Cooking, Laundry, Shopping, Meal Preparation, Medication Management, Money Management, Transportation  Assesssment of Functional  "Status: Not at baseline with ADL Functioning    Mental Health Status:  Mental Health Status: No Current Concerns       Chemical Dependency Status:  Chemical Dependency Status: No Current Concerns             Values/Beliefs:  Spiritual, Cultural Beliefs, Jainism Practices, Values that affect care:            Values/Beliefs Comment: [employer]    Additional Information:  Writer met with patient to review role of observation services, discuss goals of care and assess need for any possible services at discharge. Patient alert, oriented and engaged in the conversation. A copy of the [facility] brochure was left for patient who will have her sister review. Writer also left a number for patient's sister to call if she has questions.  Patient stats that she lives with her twin sister Kate in a house. Her sister's daughter Adrianna also lives next door.  Patient has her own living space and is mostly independent with activities of daily living but her sister helps as needed as patient is blind in her left eye and is legally blind on the right (no useful vision, per patient). Her sister provides transportation.  Patient anticipates returning home with sister, stating, \"my sister takes really good care of me\". She notes that she does NOT want to go to TCU. Her son lives in [place] and is supportive also.   2:01 PM:  Per palliative care, hospice consult will be ordered. Patient is hoping to go home as soon as possible.   3:56 PM:  [facility] can meet with patient here tomorrow. Met with patient and her son Nir at the bedside to review. They agree with the plan and would like to leave here by 1 PM on Saturday at the latest. They are also asking that Nir and Kate can both attend the consult. Reviewed with charge RN who will review with ANS and update Nir. Left a message for hospice nurse liaison.     Deena Mckinney RN      "

## 2022-02-18 NOTE — ED PROVIDER NOTES
EMERGENCY DEPARTMENT ENCOUNTER      NAME: Amelia Fry  AGE: 81 year old female  YOB: 1940  MRN: [id number]  EVALUATION DATE & TIME: 2022  5:59 PM    PCP: Roland Henriquez    ED PROVIDER: Jamila Piña M.D.        Chief Complaint   Patient presents with     Shortness of Breath         FINAL IMPRESSION:    1. SOB (shortness of breath)    2. Chest mass    3. Pathological fracture of cervical vertebra, initial encounter    4. Pathological fracture of thoracic vertebra, initial encounter    5. Closed fracture of multiple ribs, unspecified laterality, initial encounter            MEDICAL DECISION MAKIN year old female with history of multiple myeloma who presents emergency department with reports of increasing fatigue and shortness of breath. Oxygen saturation 88% on room air upon arrival to triage. At rest now she is 94% on room air without any shortness of breath. Work-up in the ER does not show any acute pulmonary pathology, but does show a possible new or subacute C3 compression fracture with a pathologic component. Rigid collar was applied by me. Discussed with neurosurgery. Plan is admission to the hospital for MRI imaging and continuous wear of cervical collar at this time. Patient aware and agrees. Nursing is updated family. Pt accepted by hospitalist service, Dr. Virgen.      ED COURSE:  6:33 PM I met with the patient to gather history and perform my exam. ED course and treatment discussed.    9:18 PM I discussed patient's case with Dr. Leonard, [facility].    9:38 PM  Spoke with Elba from neurosurgery who would like her in a cervical collar and MRI of her entire spine for all these fractures. She is concerned that pathologic fractures can be unstable.    9:45 PM PM  Rigid collar was applied by me. Patient tolerated well. Neuro exam remains unchanged.    10:15 PM  Patient accepted to the hospital by the hospitalist and will go to [facility] [place].    Unclear true  etiology of her shortness of breath there is no signs for PE on imaging. No obvious pneumonia and recently recovered from Covid. Not short of breath at rest and not requiring oxygen at rest. BNP within normal limits. Do not want to ambulate her to recheck pulse ox given this C3 fracture and the need for MRI imaging. This can certainly be done after her C-spine is further imaged. At this time I do feel she would benefit from admission to the hospital for some IV fluids as she does appear a little dehydrated and further evaluation of these spinal fractures.    I do not think that this represents rib fractures, allergic reaction, COPD exacerbation, asthma exacerbation, pneumonia, CHF, myocarditis, pericarditis, endocarditis, ACS, PE, ruptured AAA, pneumothorax, aortic dissection, bowel obstruction, or other such etiologies at this time.    COVID-19 PPE worn during patient evaluation:  Mask: n95 and homemade masks  Eye Protection: goggles  Gown: none  Hair cover: yes  Face shield: yes   Patient wearing a mask: yes    At the conclusion of the encounter I discussed the results of all of the tests and the disposition. Their questions were answered. The patient (and any family present) acknowledged understanding and were agreeable with the care plan.      CONSULTANTS:  Radiology - Dr. Leonard  Neurosurgery- Elba, JOE    MEDICATIONS GIVEN IN THE EMERGENCY:  Medications   0.9% sodium chloride BOLUS (250 mLs Intravenous New Bag 2/17/22 2000)   iopamidol (ISOVUE-370) solution 100 mL (100 mLs Intravenous Given 2/17/22 1954)         NEW PRESCRIPTIONS STARTED AT TODAY'S ER VISIT     Medication List      There are no discharge medications for this visit.             CONDITION:  stable        DISPOSITION:  Med surg as accepted by Dr. Virgen, hospitalist         =================================================================  =================================================================    HPI    Patient information was  "obtained from: Patient    Use of : N/A      Amelia Fry is a 81 year old female with history of HTN, HLD, multiple myeloma, aspiration pneumonia, legal blindness, chronic back pain, osteopenia, who presents to the ER with complaints of shortness of breath.     Patient reports she was at [facility] today for her routine labs she gets due to multiple myeloma. Patient lives with her sister and her sister was at the appointment with her. While there, patient's sister told the clinic that patient \"just isn't quite herself\" and feels that patient has been more short of breath recently and not as active as she normally is. Patient did have COVID 2 weeks ago but feels like she has been recovering well and feels improved. Patient states she occasionally she will have shortness of breath but denies any provoking factors. Denies current shortness of breath.     Patient does have chronic back pain along her entire spine but most prominent between her shoulder blades and takes oxycodone for this. Patient states pain worsened one week ago after she fell attempting to get onto the toilet and she hit her back on the front of the toilet bowl. She denies any head trauma or loss of consciousness. She states her back pain is most severe between the shoulder blades but this is also the location of her chronic pain.    Patient otherwise denies fever, cough, chest pain, abdominal pain, vomiting, diarrhea, or additional medical concerns or complaints at this time.        REVIEW OF SYSTEMS  Review of Systems   Constitutional: Negative for fever.   HENT: Negative for trouble swallowing.    Respiratory: Positive for shortness of breath. Negative for cough.    Cardiovascular: Negative for chest pain.   Gastrointestinal: Negative for abdominal pain, diarrhea, nausea and vomiting.   Genitourinary: Negative for dysuria.   Musculoskeletal: Positive for back pain. Negative for neck pain.   Skin: Negative for wound. "   Allergic/Immunologic: Negative for immunocompromised state.   Neurological: Negative for dizziness, light-headedness and headaches.   Psychiatric/Behavioral: Negative for confusion.   All other systems reviewed and are negative.        PAST MEDICAL HISTORY:  Past Medical History:   Diagnosis Date     Chronic back pain      COVID-19     January 2022     Hypertension      Legal blindness      Multiple myeloma (H)          PAST SURGICAL HISTORY:  Past Surgical History:   Procedure Laterality Date     EYE SURGERY           CURRENT MEDICATIONS:    Prior to Admission medications    Medication Sig Start Date End Date Taking? Authorizing Provider   acetaminophen (TYLENOL) 325 MG tablet Take 650 mg by mouth every 8 hours as needed for fever or pain     Unknown, Entered By History   amoxicillin-clavulanate (AUGMENTIN) 875-125 MG tablet Take 1 tablet by mouth 2 times daily 1/20/22   Tana Boo MD   aspirin 81 mg chewable tablet Take 81 mg by mouth daily  3/30/16   Provider, Historical   brimonidine (ALPHAGAN P) 0.1 % ophthalmic solution Place 1 drop into the right eye 2 times daily  9/20/16   Provider, Historical   cholecalciferol 25 MCG (1000 UT) TABS Take 1,000 Units by mouth daily HOLD UNTIL EGD IS DONE 1/20/22   Tana Boo MD   dorzolamide-timolol (COSOPT) 2-0.5 % ophthalmic solution Place 1 drop into the right eye 2 times daily    Unknown, Entered By History   gabapentin (NEURONTIN) 300 MG capsule Take 1 capsule (300 mg) by mouth every evening HOLD UNTIL EGD IS DONE 1/20/22   Tana Boo MD   levothyroxine (SYNTHROID, LEVOTHROID) 100 MCG tablet Take 100 mcg by mouth daily before breakfast  9/20/16   Provider, Historical   loperamide (IMODIUM) 2 MG capsule Take 2 mg by mouth 4 times daily as needed for diarrhea     Unknown, Entered By History   MELALEUCA SC Take by mouth daily Melaleuca vitamin supplements containing - Multivitamin, minerals, calcium    Unknown, Entered By History   morphine  sulfate (ROXANOL) 20 mg/mL (HIGH CONC) soln Take 0.25 mLs (5 mg) by mouth every 4 hours as needed for moderate to severe pain 1/20/22   Tana Boo MD   netarsudil (RHOPRESSA) 0.02 % ophthalmic solution Place 1 drop into the right eye At Bedtime    Unknown, Entered By History   ondansetron (ZOFRAN) 8 MG tablet Take 8 mg by mouth every 8 hours as needed for nausea    Unknown, Entered By History   sennosides (SENOKOT) 8.8 MG/5ML syrup Take 5 mLs by mouth 2 times daily 1/20/22   Tana Boo MD   tafluprost, PF, (ZIOPTAN, PF,) 0.0015 % Dpet ophthalmic dropperette Place 1 drop into the right eye At Bedtime  9/20/16   Provider, Historical   vitamin E (TOCOPHEROL) 400 units (180 mg) capsule Take 1 capsule (400 Units) by mouth daily HOLD UNTIL YOU HAVE EGD DONE 1/20/22   Tana Boo MD         ALLERGIES:  Allergies   Allergen Reactions     Codeine Anaphylaxis     Chest pressure, shortness of breath     Fentanyl Anaphylaxis     Bee Venom Swelling         FAMILY HISTORY:  History reviewed. No pertinent family history.      SOCIAL HISTORY:  Social History     Socioeconomic History     Marital status:      Spouse name: None     Number of children: None     Years of education: None     Highest education level: None   Occupational History     None   Tobacco Use     Smoking status: Never Smoker     Smokeless tobacco: Never Used   Substance and Sexual Activity     Alcohol use: Yes     Comment: Alcoholic Drinks/day: a few glasses of wine a month     Drug use: No     Sexual activity: None   Other Topics Concern     None   Social History Narrative     None     Social Determinants of Health     Financial Resource Strain: Not on file   Food Insecurity: Not on file   Transportation Needs: Not on file   Physical Activity: Not on file   Stress: Not on file   Social Connections: Not on file   Intimate Partner Violence: Not on file   Housing Stability: Not on file         VITALS:  Patient Vitals for the past 24  "hrs:   BP Temp Temp src Pulse Resp SpO2 Height Weight   02/17/22 2115 -- -- -- 103 20 94 % -- --   02/17/22 2100 130/62 -- -- 105 22 94 % -- --   02/17/22 2000 (!) 149/68 -- -- 101 22 94 % -- --   02/17/22 1900 133/60 -- -- 107 21 93 % -- --   02/17/22 1717 -- -- -- -- -- 97 % -- --   02/17/22 1712 (!) 144/75 99.9  F (37.7  C) Oral 120 16 (!) 88 % 1.651 m (5' 5\") 47.2 kg (104 lb)       Wt Readings from Last 3 Encounters:   02/17/22 47.2 kg (104 lb)   01/20/22 49.4 kg (109 lb)   01/12/22 51.7 kg (114 lb)         PHYSICAL EXAM    Constitutional:  Well developed, Well nourished, NAD, GCS 15  HENT:  Normocephalic, Atraumatic, Bilateral external ears normal, Nose normal. Neck-  Normal range of motion, No tenderness, Supple, No stridor.   Eyes:  PERRL, EOMI, Conjunctiva normal, No discharge.  Respiratory:  Normal breath sounds, No respiratory distress, No wheezing, Speaks full sentences easily. No cough.   Cardiovascular:  Normal heart rate, Regular rhythm, No murmurs, No rubs, No gallops. Chest wall nontender.   GI:  No excessive obesity.  Bowel sounds normal, Soft, No tenderness, No masses, No flank tenderness. No rebound or guarding.   : deferred  Musculoskeletal: 2+ DP pulses. No edema.  No cyanosis, No clubbing. Good range of motion in all major joints. No tenderness to palpation or major deformities noted. No tenderness of the CTLS spine.  Integument:  Warm, Dry, No erythema, No rash.  No petechiae.   Neurologic:  Alert & oriented x 3, Normal motor function, Normal sensory function, No focal deficits noted.   Psychiatric:  Affect normal, Cooperative          LAB:  All pertinent labs reviewed and interpreted.  Recent Results (from the past 24 hour(s))   Troponin I (now)    Collection Time: 02/17/22  6:10 PM   Result Value Ref Range    Troponin I 0.02 0.00 - 0.29 ng/mL   Comprehensive metabolic panel    Collection Time: 02/17/22  6:10 PM   Result Value Ref Range    Sodium 131 (L) 136 - 145 mmol/L    Potassium 3.5 " 3.5 - 5.0 mmol/L    Chloride 103 98 - 107 mmol/L    Carbon Dioxide (CO2) 21 (L) 22 - 31 mmol/L    Anion Gap 7 5 - 18 mmol/L    Urea Nitrogen 18 8 - 28 mg/dL    Creatinine 0.70 0.60 - 1.10 mg/dL    Calcium 9.2 8.5 - 10.5 mg/dL    Glucose 141 (H) 70 - 125 mg/dL    Alkaline Phosphatase 81 45 - 120 U/L    AST 29 0 - 40 U/L    ALT 11 0 - 45 U/L    Protein Total 9.8 (H) 6.0 - 8.0 g/dL    Albumin 3.1 (L) 3.5 - 5.0 g/dL    Bilirubin Total 0.5 0.0 - 1.0 mg/dL    GFR Estimate 87 >60 mL/min/1.73m2   B-Type Natriuretic Peptide ([facility] Only)    Collection Time: 02/17/22  6:10 PM   Result Value Ref Range    BNP 83 0 - 155 pg/mL   CBC with platelets and differential    Collection Time: 02/17/22  6:10 PM   Result Value Ref Range    WBC Count 7.3 4.0 - 11.0 10e3/uL    RBC Count 3.57 (L) 3.80 - 5.20 10e6/uL    Hemoglobin 11.5 (L) 11.7 - 15.7 g/dL    Hematocrit 35.4 35.0 - 47.0 %    MCV 99 78 - 100 fL    MCH 32.2 26.5 - 33.0 pg    MCHC 32.5 31.5 - 36.5 g/dL    RDW 14.7 10.0 - 15.0 %    Platelet Count 201 150 - 450 10e3/uL    % Neutrophils 62 %    % Lymphocytes 25 %    % Monocytes 13 %    % Eosinophils 0 %    % Basophils 0 %    % Immature Granulocytes 0 %    NRBCs per 100 WBC 0 <1 /100    Absolute Neutrophils 4.5 1.6 - 8.3 10e3/uL    Absolute Lymphocytes 1.8 0.8 - 5.3 10e3/uL    Absolute Monocytes 0.9 0.0 - 1.3 10e3/uL    Absolute Eosinophils 0.0 0.0 - 0.7 10e3/uL    Absolute Basophils 0.0 0.0 - 0.2 10e3/uL    Absolute Immature Granulocytes 0.0 <=0.4 10e3/uL    Absolute NRBCs 0.0 10e3/uL       Lab Results   Component Value Date    ABORH A POS 01/18/2022           RADIOLOGY:  Reviewed all pertinent imaging. Please see official radiology report.    CT Chest Pulmonary Embolism w Contrast   Final Result   IMPRESSION:   1.  Diffuse myelomatous change in the skeleton including with multiple vertebral body compression fractures and multiple rib fractures with large expansile mass anteriorly on the right at rib 4. Interval callus formation  about some of these pathological    fractures since the study done a month ago. There are multiple others areas of rib destruction which likely explains her chest pain.   2.  No evidence of pulmonary emboli.      Lumbar spine CT w/o contrast   Final Result   IMPRESSION:     Thoracic spine CT:   1.  Multiple lucencies lesions within the thoracic spine compatible with the patient's reported history of multiple myeloma.   2.  Multilevel compression fractures at T2, T4, T6-T9, T10, and T12, unchanged from 01/17/2022   3.  No definite evidence of new acute fracture or subluxation of the thoracic spine by CT imaging.      Lumbar spine CT:   1.  Superior endplate L2 compression fracture, unchanged since chest CT 01/17/2022, though new since lumbar spine MRI 09/02/2021, age-indeterminate.   2.  No other evidence of acute fracture or subluxation of the lumbar spine by CT imaging.   3.  Chronic L1, L3 and L4 compression fractures, unchanged.   4.  Marrow replacing process within the S2 vertebral body and posterior elements, unchanged.   5.  Degenerative lumbar spondylosis with level by level analysis as described above.      CT Thoracic Spine w/o Contrast   Final Result   IMPRESSION:     Thoracic spine CT:   1.  Multiple lucencies lesions within the thoracic spine compatible with the patient's reported history of multiple myeloma.   2.  Multilevel compression fractures at T2, T4, T6-T9, T10, and T12, unchanged from 01/17/2022   3.  No definite evidence of new acute fracture or subluxation of the thoracic spine by CT imaging.      Lumbar spine CT:   1.  Superior endplate L2 compression fracture, unchanged since chest CT 01/17/2022, though new since lumbar spine MRI 09/02/2021, age-indeterminate.   2.  No other evidence of acute fracture or subluxation of the lumbar spine by CT imaging.   3.  Chronic L1, L3 and L4 compression fractures, unchanged.   4.  Marrow replacing process within the S2 vertebral body and posterior  elements, unchanged.   5.  Degenerative lumbar spondylosis with level by level analysis as described above.      Cervical spine CT w/o contrast   Final Result   Addendum 1 of 1   Addendum/   impression:      Dr. Leonard discussed the results with Dr. Piña on 2/17/2022 9:21 PM by    telephone.      Final   IMPRESSION:   1.  Pathologic fracture involving the superior plate of C3, new since cervical spine MRI 12/08/2017, likely acute.   2.  Lucent  lesions throughout the cervical and partially imaged thoracic spine, compatible with the patient's reported history of myeloma.    3.  No other evidence of acute fracture or subluxation of the cervical spine by CT imaging.   4.  Chronic pathologic T2 compression fracture which was present on prior cervical spine MRI 12/08/2017.       Head CT w/o contrast   Final Result   Addendum 1 of 1   Addendum/   impression:      There are a few lucent lesions throughout the calvarium, compatible the    patient's reported history of myeloma.      Final   IMPRESSION:     1.  No evidence of acute intracranial hemorrhage or mass effect.   2.  Mild nonspecific white matter changes.   3.  Mild brain parenchymal volume loss.            EKG:    Performed at: 17:47p    Impression: Sinus tachycardia at 122 bpm. Flipped T waves noted in lead III, aVR, aVF, V1 and V3-V4. SD interval 146 ms,  ms,  ms. EKG does appear similar to January 17, 2022.    I have independently reviewed and interpreted the EKG(s) documented above.        PROCEDURES:  none          I, Keshia May, am serving as a scribe to document services personally performed by Dr. Jamila Piña based on my observation and the provider's statements to me. I, Dr. Jamila Piña MD attest that Keshia May is acting in a scribe capacity, has observed my performance of the services and has documented them in accordance with my direction.        Jmaila Piña M.D. [facility]  Emergency Medicine and Medical  Toxicology  Formerly [facility] EMERGENCY DEPARTMENT  [facility] [facility] [address]  [phone]  Dept: [phone]           Jamila Piña MD  02/17/22 4480

## 2022-02-18 NOTE — UTILIZATION REVIEW
Admission Status; Secondary Review Determination   Under the authority of the Utilization Management Committee, the utilization review process indicated a secondary review on Amelia Fry. The review outcome is based on review of the medical records, discussions with staff, and applying clinical experience noted on the date of the review.   (x) Inpatient Status Appropriate - This patient's medical care is consistent with medical management for inpatient care and reasonable inpatient medical practice.     RATIONALE FOR DETERMINATION   81 yr old female with multiple myeloma, drug induced polyneuropathy, malnutrition presented 2/17 with SOB and fall.  Has C3 pathologic fracture, chest wall mass, multiple rib fracture.  Neurosurgery consulting and bracing planned.  Pain management with chronic narcotics and pain team assisting with acute on chronic pain.  Oncology following in addition.  Working on symptom control and looking at setting up hospice.      At the time of admission with the information available to the attending physician more than 2 nights Hospital complex care was anticipated, based on patient risk of adverse outcome if treated as outpatient and complex care required. Inpatient admission is appropriate based on the Medicare guidelines.   The information on this document is developed by the utilization review team in order for the business office to ensure compliance. This only denotes the appropriateness of proper admission status and does not reflect the quality of care rendered.   The definitions of Inpatient Status and Observation Status used in making the determination above are those provided in the CMS Coverage Manual, Chapter 1 and Chapter 6, section 70.4.   Sincerely,   Barbara Moore MD  Utilization Review  Physician Advisor  [facility]

## 2022-02-18 NOTE — PLAN OF CARE
PRIMARY DIAGNOSIS: ACUTE PAIN  OUTPATIENT/OBSERVATION GOALS TO BE MET BEFORE DISCHARGE:  1. Pain Status: Improved-controlled with oral pain medications.    2. Return to near baseline physical activity: No    3. Cleared for discharge by consultants (if involved): No    Discharge Planner Nurse   Safe discharge environment identified: No  Barriers to discharge: Yes       Entered by: Eugenio Mar 02/18/2022 4:00 PM     Please review provider order for any additional goals.   Nurse to notify provider when observation goals have been met and patient is ready for discharge.Goal Outcome Evaluation:               Outcome Evaluation: Hospice consult is pending. Goal is home as soon as possible.

## 2022-02-18 NOTE — PLAN OF CARE
Goal Outcome Evaluation:                        PRIMARY DIAGNOSIS: sob and c3 fx  OUTPATIENT/OBSERVATION GOALS TO BE MET BEFORE DISCHARGE:  ADLs back to baseline: No    Activity and level of assistance: pt on bedrest    Pain status: Improved-controlled with oral pain medications. Has scheduled narcotics at home.    Return to near baseline physical activity: No     Discharge Planner Nurse   Safe discharge environment identified: No  Barriers to discharge:  neuro surgery consult       Entered by: Raine Escoto 02/18/2022 7:16 AM   Spoke with pts son, iNr, and he will be coming down from [place] to visit her today.   Please review provider order for any additional goals.   Nurse to notify provider when observation goals have been met and patient is ready for discharge.

## 2022-02-18 NOTE — CONSULTS
[facility] ACUTE PAIN SERVICE    ([facility], [facility], [facility])   Consult Note    Date of Admission:  2/17/2022  Date of Consult: 02/18/22    Physician requesting consult: Paulo Kruse MD   Reason for consult: pain management for patient with cervical and rib fractures, history of multiple myeloma     Assessment/Plan:     Amelia Fry is a 81 year old female who was admitted on 2/17/2022.   Pain Service is asked to see the patient for pain management for patient patient with cervical and rib fractures, history of multiple myeloma.   Admitted for evaluation of shortness of breath, COVID for over 2 weeks, fell 5 days ago, with worsening back pain.  Imaging demonstrate no acute pulmonary pathology, with acute vs subacute C3 compression fracture with pathologic component.      Patient's medical history significant for multiple myeloma follows with [facility], chronic back pain, hypothyroidism, hyponatremia, Glaucoma, conjunctivitis right eye, anemia of chronic disease, hypomagnesia.     Neurosurgery and Oncology have been Consulted.  Oncology met with patient with discussion regarding limited ongoing treatment options and recommending hospice support.     Palliative Care Consult which is pending.  Neurosurgery Plan:   Plan:  1. Orthotist consult for [place]  2. No MRI  3. Collar on at all times  4. Can get up with [place] until [place] arrives.   5. X-rays with [place] J in place       Over past 24 hours patient received 1of her home doses of OxyContin 30 mg at 0140    Reviewed patient's pain management plan.  Patient recently with aspiration pneumonia, recently cleared to be able to take OxyContin otherwise pills in liquid or crushed.  Her gabapentin was discontinued.  Unsure why this was stopped.  Patient was not aware that it is available in liquid form.    States that pain is pretty well controlled especially at rest.  Has been able to manage it with her scheduled OxyContin 30 mg which she  "takes at 08 and 8PM    Patient had been prescribed liquid morphine which she did not find effective at all.  She was taking this when she was not on able to take the OxyContin so explains why was getting as much pain control.    Would be Okay as breakthrough pain medication, but she seems able to crush and take oxycodone prn.  (although she hasn't seemed to need the extra doses)  She does take liquid tylenol as needed.    Patient is scheduled for EGD 3/22/22, with goals for possibly hospice wonder if swallow eval in hospital would be helpful for ongoing planning for symptom issues.      PLAN:   1) Pain is consistent with acute pain due to fall with pathological fractures with history of chronic back pain due to multiple myeloma with multiple spinal fractures on daily opioid therapy see  report below.  The patient's home MME was 120 mg daily.   2)Multimodal Medication Therapy  Topical: lidocaine cream qid prn   NSAID'S: avoid  Muscle Relaxants: consider  Adjuvants: tylenol 975 mg tid, gabapentin per    Antidepressants/anxiolytics: none  Opioids: oxycodone 5-10 mg every four hours prn  IV Pain medication:  OxyContin 30 mg bid adjusted to 18 and 06 for next 2 doses then back to 08 and 20  3)Non-medication interventions  Acupuncture consult- as available Mon and Thursday   Integrative consult - called referral to [phone]   4)Constipation Prophylaxis  Senna S bid   5) Follow up   -Opioid prescriber has been [facility]  -Discharge Recommendations - We recommend prescribing the following at the time of discharge: should not need any additional opioid prescriptions upon discharge          History of Present Illness (HPI):       Amelia Fry is a 81 year old female with acute upon chronic pain.  The pain is reported to be acute worsening pain left shoulder pain, \"nagging pain\" improves with rest.  Has chronic back pain worse in lumbar spine.   Identifies that pain has been under good control with " her current pain plan that she has worked out with her Oncology Nurse Practitioner.       Main detriment to ongoing pain control may be swallowing difficulty if she is not able to swallow her long acting pain medication.    Okay to continue to crush her oxycodone 5 mg dose and take as needed.    May need assistance with using the Morphine Concentrate if she goes into hospice for breakthrough pain.      Patient has established a bowel program with liquid senna daily and prn miralax if no BM in two days      [facility]  pulled from system on 02/18/22. Last refill on 1/21/22   Morphine Sulfate 100mg/5ml  For 10 days (MME per  calculated at 30 mg/day  Oxycontin 30 mg 60 for 30 days (bid dosing) 90 MME/day.   Dronabinol 2.5 mg capsule 60 for 30 days  Gabapentin 300 mg 30 for 30 days.     Most common prescriber is [facility]       Medical History  Patient Active Problem List    Diagnosis Date Noted     Chest mass 02/17/2022     Priority: Medium     SOB (shortness of breath) 02/17/2022     Priority: Medium     Pathological fracture of thoracic vertebra, initial encounter 02/17/2022     Priority: Medium     Closed fracture of multiple ribs, unspecified laterality, initial encounter 02/17/2022     Priority: Medium     Pathological fracture of cervical vertebra, initial encounter 02/17/2022     Priority: Medium     Aspiration pneumonia (H) 01/17/2022     Priority: Medium     Pneumonia due to 2019 novel coronavirus 01/17/2022     Priority: Medium     Acute respiratory failure with hypoxia (H) 01/17/2022     Priority: Medium     Acute metabolic encephalopathy 01/17/2022     Priority: Medium     Body mass index (BMI) of 20 to 24 01/12/2022     Priority: Medium     Degeneration of lumbar intervertebral disc 01/12/2022     Priority: Medium     Drug-induced polyneuropathy (H) 01/12/2022     Priority: Medium     Essential hypertension 01/12/2022     Priority: Medium     Glaucoma 01/12/2022     Priority:  Medium     History of fall 01/12/2022     Priority: Medium     History of musculoskeletal disease 01/12/2022     Priority: Medium     Late effect of fracture of spine and trunk without spinal cord lesion 01/12/2022     Priority: Medium     Legal blindness, as defined in [place] 01/12/2022     Priority: Medium     Long term (current) use of aspirin 01/12/2022     Priority: Medium     Long term (current) use of opiate analgesic 01/12/2022     Priority: Medium     Mixed hyperlipidemia 01/12/2022     Priority: Medium     Osteopenia 01/12/2022     Priority: Medium     Primary insomnia 01/12/2022     Priority: Medium     Squamous cell carcinoma of skin of neck 01/12/2022     Priority: Medium     Vitamin D deficiency 01/12/2022     Priority: Medium     Left leg weakness 09/02/2021     Priority: Medium     Intractable pain 09/02/2021     Priority: Medium     Chronic back pain      Priority: Medium     Multiple myeloma (H)      Priority: Medium     Diarrhea      Priority: Medium     Weakness      Priority: Medium     Hypothyroidism, unspecified type      Priority: Medium     Sternal fracture 09/20/2016     Priority: Medium        Surgical History  She  has a past surgical history that includes Eye surgery.     Past Surgical History:   Procedure Laterality Date     EYE SURGERY         Allergies  Allergies   Allergen Reactions     Codeine Anaphylaxis     Chest pressure, shortness of breath     Fentanyl Anaphylaxis     Bee Venom Swelling       Prior to Admission Medications   Medications Prior to Admission   Medication Sig Dispense Refill Last Dose     acetaminophen (TYLENOL) 325 MG tablet Take 650 mg by mouth every 8 hours as needed for fever or pain    Unknown at PRN     aspirin 81 mg chewable tablet Take 81 mg by mouth daily    2/17/2022 at AM     brimonidine (ALPHAGAN P) 0.1 % ophthalmic solution Place 1 drop into the right eye 2 times daily    2/17/2022 at AM     dorzolamide-timolol (COSOPT) 2-0.5 %  ophthalmic solution Place 1 drop into the right eye 2 times daily   2/17/2022 at AM     levothyroxine (SYNTHROID, LEVOTHROID) 100 MCG tablet Take 100 mcg by mouth daily before breakfast    2/17/2022 at AM     loperamide (IMODIUM) 2 MG capsule Take 2 mg by mouth 4 times daily as needed for diarrhea    Unknown at PRN     MELALEUCA SC Take by mouth daily Melaleuca vitamin supplements containing - Multivitamin, minerals, calcium   2/17/2022 at AM     netarsudil (RHOPRESSA) 0.02 % ophthalmic solution Place 1 drop into the right eye At Bedtime   2/16/2022 at HS     ondansetron (ZOFRAN) 8 MG tablet Take 8 mg by mouth every 8 hours as needed for nausea   Unknown at PRN     oxyCODONE (ROXICODONE) 5 MG tablet Take 1-2 tablets by mouth every 4 hours as needed for pain   Unknown at PRN     OXYCONTIN 30 MG 12 hr tablet Take 30 mg by mouth every 12 hours   2/17/2022 at AM     sennosides (SENOKOT) 8.8 MG/5ML syrup Take 5 mLs by mouth 2 times daily (Patient taking differently: Take 10 mLs by mouth At Bedtime ) 236 mL 0 2/16/2022 at HS     tafluprost, PF, (ZIOPTAN, PF,) 0.0015 % Dpet ophthalmic dropperette Place 1 drop into the right eye At Bedtime    2/16/2022 at HS     cholecalciferol 25 MCG (1000 UT) TABS Take 1,000 Units by mouth daily HOLD UNTIL EGD IS DONE (Patient not taking: Reported on 2/17/2022)   Not Taking at Unknown time     gabapentin (NEURONTIN) 300 MG capsule Take 1 capsule (300 mg) by mouth every evening HOLD UNTIL EGD IS DONE (Patient not taking: Reported on 2/17/2022)   Not Taking at Unknown time     vitamin E (TOCOPHEROL) 400 units (180 mg) capsule Take 1 capsule (400 Units) by mouth daily HOLD UNTIL YOU HAVE EGD DONE (Patient not taking: Reported on 2/17/2022)   Not Taking at Unknown time       Social History  Reviewed, and she  reports that she has never smoked. She has never used smokeless tobacco. She reports current alcohol use. She reports that she does not use drugs.  Social History     Tobacco Use      "Smoking status: Never Smoker     Smokeless tobacco: Never Used   Substance Use Topics     Alcohol use: Yes     Comment: Alcoholic Drinks/day: a few glasses of wine a month       Family History  Reviewed, and family history is not on file.    Review of Systems  Complete ROS reviewed, unless noted, all other systems reviewed and found to be negative.        Objective:     Physical Exam:  /60 (BP Location: Left arm)   Pulse 86   Temp 98.4  F (36.9  C) (Oral)   Resp 16   Ht 1.651 m (5' 5\")   Wt 47.2 kg (104 lb)   SpO2 92%   BMI 17.31 kg/m    Weight:   Weight change:   Body mass index is 17.31 kg/m .      General Appearance:  Alert, cooperative, sitting up in bed, offers minimal complaint   Head:  Normocephalic, without obvious abnormality, atraumatic   Eyes:  PERRL, conjunctiva/corneas clear, EOM's intact   ENT/Throat: Lips, mucosa, and tongue normal; teeth and gums normal   Lymph/Neck: Supple, symmetrical, trachea midline   Lungs:   Respirations unlabored   Chest Wall:  No tenderness or deformity   Cardiovascular/Heart:  Regular rate and rhythm  Edema: none   Abdomen:   Soft, non-tender, non distended   Musculoskeletal: Decreased range of motion, pain with movement,    Skin: Skin color, texture, turgor normal, no rashes or lesions   Neurologic: cooridanated movement   Alert and oriented X 3       Psych: Affect is appropriate to circumstance            Imaging Reviewed  Cervical spine CT w/o contrast    Addendum Date: 2/17/2022    Addendum/ impression: Dr. Leonard discussed the results with Dr. Piña on 2/17/2022 9:21 PM by telephone.    Result Date: 2/17/2022  EXAM: CT CERVICAL SPINE W/O CONTRAST LOCATION: [facility] DATE/TIME: 2/17/2022 7:47 PM INDICATION: Back pain COMPARISON: Cervical spine MRI 12/08/2017 TECHNIQUE: Routine CT Cervical Spine without IV contrast. Multiplanar reformats. Dose reduction techniques were used.     IMPRESSION: 1.  Pathologic fracture involving the " superior plate of C3, new since cervical spine MRI 12/08/2017, likely acute. 2.  Lucent  lesions throughout the cervical and partially imaged thoracic spine, compatible with the patient's reported history of myeloma. 3.  No other evidence of acute fracture or subluxation of the cervical spine by CT imaging. 4.  Chronic pathologic T2 compression fracture which was present on prior cervical spine MRI 12/08/2017.     CT Chest Pulmonary Embolism w Contrast    Result Date: 2/17/2022  EXAM: CT CHEST PULMONARY EMBOLISM W CONTRAST LOCATION: [facility] DATE/TIME: 2/17/2022 7:48 PM INDICATION: Shortness of breath. Multiple myeloma. COMPARISON: Chest CTA 01/17/2022 TECHNIQUE: CT chest pulmonary angiogram during arterial phase injection of IV contrast. Multiplanar reformats and MIP reconstructions were performed. Dose reduction techniques were used. CONTRAST: isovue 370 100ml     IMPRESSION: 1.  Diffuse myelomatous change in the skeleton including with multiple vertebral body compression fractures and multiple rib fractures with large expansile mass anteriorly on the right at rib 4. Interval callus formation about some of these pathological fractures since the study done a month ago. There are multiple others areas of rib destruction which likely explains her chest pain. 2.  No evidence of pulmonary emboli.    Head CT w/o contrast    Addendum Date: 2/17/2022    Addendum/ impression: There are a few lucent lesions throughout the calvarium, compatible the patient's reported history of myeloma.    Result Date: 2/17/2022  EXAM: CT HEAD W/O CONTRAST LOCATION: [facility] DATE/TIME: 2/17/2022 7:46 PM INDICATION: Fall, weakness COMPARISON: None. TECHNIQUE: Routine CT Head without IV contrast. Multiplanar reformats. Dose reduction techniques were used.     IMPRESSION:  1.  No evidence of acute intracranial hemorrhage or mass effect. 2.  Mild nonspecific white matter changes. 3.  Mild  brain parenchymal volume loss.    Lumbar spine CT w/o contrast    Result Date: 2/17/2022  EXAM: CT THORACIC SPINE W/O CONTRAST, CT LUMBAR SPINE W/O CONTRAST LOCATION: [facility] DATE/TIME: 2/17/2022 7:47 PM INDICATION: Mid-back pain, compression fracture suspected COMPARISON: Cervical spine CT performed same day. Cervical spine MRI 12/08/2017. Chest CT 01/17/2022 TECHNIQUE: Routine CT Thoracic and Lumbar Spine without IV contrast. Multiplanar reformats. Dose reduction techniques were used.     IMPRESSION:  Thoracic spine CT: 1.  Multiple lucencies lesions within the thoracic spine compatible with the patient's reported history of multiple myeloma. 2.  Multilevel compression fractures at T2, T4, T6-T9, T10, and T12, unchanged from 01/17/2022 3.  No definite evidence of new acute fracture or subluxation of the thoracic spine by CT imaging. Lumbar spine CT: 1.  Superior endplate L2 compression fracture, unchanged since chest CT 01/17/2022, though new since lumbar spine MRI 09/02/2021, age-indeterminate. 2.  No other evidence of acute fracture or subluxation of the lumbar spine by CT imaging. 3.  Chronic L1, L3 and L4 compression fractures, unchanged. 4.  Marrow replacing process within the S2 vertebral body and posterior elements, unchanged. 5.  Degenerative lumbar spondylosis with level by level analysis as described above.    CT Thoracic Spine w/o Contrast    Result Date: 2/17/2022  EXAM: CT THORACIC SPINE W/O CONTRAST, CT LUMBAR SPINE W/O CONTRAST LOCATION: [facility] DATE/TIME: 2/17/2022 7:47 PM INDICATION: Mid-back pain, compression fracture suspected COMPARISON: Cervical spine CT performed same day. Cervical spine MRI 12/08/2017. Chest CT 01/17/2022 TECHNIQUE: Routine CT Thoracic and Lumbar Spine without IV contrast. Multiplanar reformats. Dose reduction techniques were used.     IMPRESSION:  Thoracic spine CT: 1.  Multiple lucencies lesions within the thoracic  spine compatible with the patient's reported history of multiple myeloma. 2.  Multilevel compression fractures at T2, T4, T6-T9, T10, and T12, unchanged from 01/17/2022 3.  No definite evidence of new acute fracture or subluxation of the thoracic spine by CT imaging. Lumbar spine CT: 1.  Superior endplate L2 compression fracture, unchanged since chest CT 01/17/2022, though new since lumbar spine MRI 09/02/2021, age-indeterminate. 2.  No other evidence of acute fracture or subluxation of the lumbar spine by CT imaging. 3.  Chronic L1, L3 and L4 compression fractures, unchanged. 4.  Marrow replacing process within the S2 vertebral body and posterior elements, unchanged. 5.  Degenerative lumbar spondylosis with level by level analysis as described above.      Labs Reviewed       Total time spent 82 minutes with greater than 50% in consultation, education and coordination of care.     Also discussed with RN  Thank you for this consultation.      Mavis FIELDS, CNS-BC, DNP  Acute Care Pain Management Program  [facility] ([place], [place], [place])   With questions call [phone]  Preference if for [facility]  Click HERE to page Emilee

## 2022-02-19 VITALS
RESPIRATION RATE: 18 BRPM | OXYGEN SATURATION: 97 % | DIASTOLIC BLOOD PRESSURE: 75 MMHG | TEMPERATURE: 98 F | BODY MASS INDEX: 17.33 KG/M2 | HEIGHT: 65 IN | SYSTOLIC BLOOD PRESSURE: 138 MMHG | WEIGHT: 104 LBS | HEART RATE: 84 BPM

## 2022-02-19 PROCEDURE — 250N000013 HC RX MED GY IP 250 OP 250 PS 637: Performed by: INTERNAL MEDICINE

## 2022-02-19 PROCEDURE — 250N000009 HC RX 250: Performed by: FAMILY MEDICINE

## 2022-02-19 PROCEDURE — 99239 HOSP IP/OBS DSCHRG MGMT >30: CPT | Performed by: FAMILY MEDICINE

## 2022-02-19 PROCEDURE — 250N000013 HC RX MED GY IP 250 OP 250 PS 637: Performed by: CLINICAL NURSE SPECIALIST

## 2022-02-19 PROCEDURE — 99232 SBSQ HOSP IP/OBS MODERATE 35: CPT | Performed by: CLINICAL NURSE SPECIALIST

## 2022-02-19 RX ORDER — HALOPERIDOL 2 MG/ML
.5-1 SOLUTION ORAL EVERY 6 HOURS PRN
Qty: 10 ML | Refills: 0 | Status: SHIPPED | OUTPATIENT
Start: 2022-02-19

## 2022-02-19 RX ORDER — ERYTHROMYCIN 5 MG/G
OINTMENT OPHTHALMIC EVERY 8 HOURS
Qty: 1 G | Refills: 0 | Status: SHIPPED | OUTPATIENT
Start: 2022-02-19 | End: 2022-02-26

## 2022-02-19 RX ORDER — SENNOSIDES A AND B 8.6 MG/1
1-2 TABLET, FILM COATED ORAL 2 TIMES DAILY PRN
Qty: 100 TABLET | Refills: 11 | Status: SHIPPED | OUTPATIENT
Start: 2022-02-19

## 2022-02-19 RX ORDER — GABAPENTIN 250 MG/5ML
300 SOLUTION ORAL EVERY 12 HOURS
Qty: 470 ML | Refills: 0 | Status: SHIPPED | OUTPATIENT
Start: 2022-02-19

## 2022-02-19 RX ORDER — HYDROMORPHONE HYDROCHLORIDE 1 MG/ML
1-2 SOLUTION ORAL
Qty: 30 ML | Refills: 0 | Status: SHIPPED | OUTPATIENT
Start: 2022-02-19

## 2022-02-19 RX ORDER — GABAPENTIN 250 MG/5ML
300 SOLUTION ORAL EVERY 12 HOURS SCHEDULED
Status: DISCONTINUED | OUTPATIENT
Start: 2022-02-19 | End: 2022-02-19 | Stop reason: HOSPADM

## 2022-02-19 RX ORDER — ACETAMINOPHEN 325 MG/1
975 TABLET ORAL 3 TIMES DAILY
COMMUNITY
Start: 2022-02-19

## 2022-02-19 RX ORDER — LIDOCAINE 50 MG/G
OINTMENT TOPICAL 4 TIMES DAILY
Status: DISCONTINUED | OUTPATIENT
Start: 2022-02-19 | End: 2022-02-19 | Stop reason: HOSPADM

## 2022-02-19 RX ORDER — LIDOCAINE 50 MG/G
OINTMENT TOPICAL 4 TIMES DAILY
Qty: 50 G | Refills: 0 | Status: SHIPPED | OUTPATIENT
Start: 2022-02-19

## 2022-02-19 RX ORDER — BISACODYL 10 MG
10 SUPPOSITORY, RECTAL RECTAL DAILY PRN
Qty: 2 SUPPOSITORY | Refills: 11 | Status: SHIPPED | OUTPATIENT
Start: 2022-02-19

## 2022-02-19 RX ORDER — LORAZEPAM 2 MG/ML
.25-.5 CONCENTRATE ORAL EVERY 4 HOURS PRN
Qty: 30 ML | Refills: 0 | Status: SHIPPED | OUTPATIENT
Start: 2022-02-19

## 2022-02-19 RX ADMIN — ERYTHROMYCIN 1 G: 5 OINTMENT OPHTHALMIC at 06:44

## 2022-02-19 RX ADMIN — GABAPENTIN 300 MG: 250 SOLUTION ORAL at 12:34

## 2022-02-19 RX ADMIN — BRIMONIDINE TARTRATE 1 DROP: 1 SOLUTION/ DROPS OPHTHALMIC at 08:11

## 2022-02-19 RX ADMIN — LEVOTHYROXINE SODIUM 100 MCG: 0.1 TABLET ORAL at 06:38

## 2022-02-19 RX ADMIN — OXYCODONE HYDROCHLORIDE 10 MG: 5 TABLET ORAL at 08:28

## 2022-02-19 RX ADMIN — OXYCODONE HYDROCHLORIDE 30 MG: 15 TABLET, FILM COATED, EXTENDED RELEASE ORAL at 06:36

## 2022-02-19 RX ADMIN — LIDOCAINE: 50 OINTMENT TOPICAL at 08:10

## 2022-02-19 RX ADMIN — ASPIRIN 81 MG CHEWABLE TABLET 81 MG: 81 TABLET CHEWABLE at 08:09

## 2022-02-19 RX ADMIN — LIDOCAINE: 50 OINTMENT TOPICAL at 12:33

## 2022-02-19 ASSESSMENT — ACTIVITIES OF DAILY LIVING (ADL)
ADLS_ACUITY_SCORE: 16
ADLS_ACUITY_SCORE: 16
DIFFICULTY_EATING/SWALLOWING: YES
WALKING_OR_CLIMBING_STAIRS_DIFFICULTY: NO
CONCENTRATING,_REMEMBERING_OR_MAKING_DECISIONS_DIFFICULTY: NO
DOING_ERRANDS_INDEPENDENTLY_DIFFICULTY: YES
ADLS_ACUITY_SCORE: 16
ADLS_ACUITY_SCORE: 15
ADLS_ACUITY_SCORE: 16
ADLS_ACUITY_SCORE: 15
EATING/SWALLOWING: SWALLOWING SOLID FOOD
ADLS_ACUITY_SCORE: 16
ADLS_ACUITY_SCORE: 15
NUMBER_OF_TIMES_PATIENT_HAS_FALLEN_WITHIN_LAST_SIX_MONTHS: 1
ADLS_ACUITY_SCORE: 15
ADLS_ACUITY_SCORE: 16
ADLS_ACUITY_SCORE: 16
TOILETING_ISSUES: NO
ADLS_ACUITY_SCORE: 15
ADLS_ACUITY_SCORE: 14
ADLS_ACUITY_SCORE: 14
WEAR_GLASSES_OR_BLIND: YES
FALL_HISTORY_WITHIN_LAST_SIX_MONTHS: YES
DRESSING/BATHING_DIFFICULTY: NO
ADLS_ACUITY_SCORE: 15
EQUIPMENT_CURRENTLY_USED_AT_HOME: CANE, STRAIGHT;SHOWER CHAIR

## 2022-02-19 NOTE — PROGRESS NOTES
Care Management Discharge Note    Discharge Date: 02/19/2022       Discharge Disposition: Home, Hospice    Discharge Services: Other (see comment)    Discharge DME: Other (see comment) (C collar)    Discharge Transportation:  (Family, son or sister.)    Private pay costs discussed: Not applicable    PAS Confirmation Code:  (NA)  Patient/family educated on Medicare website which has current facility and service quality ratings: yes    Education Provided on the Discharge Plan:    Persons Notified of Discharge Plans: Nursing; hospice  Patient/Family in Agreement with the Plan: yes    Handoff Referral Completed: Yes    Additional Information:  Per hospice nurse liaison, the family agrees with a home discharge and patient has a lot of support in the home. Hospice is not able to see patient until Thursday 2/24/2022 but family is okay with this plan and would like to leave as soon as possible. The hospice nurse is meeting with another family currently but will put in a note soon. Text page sent to MD. Deena Mckinney RN

## 2022-02-19 NOTE — PLAN OF CARE
Problem: Plan of Care - These are the overarching goals to be used throughout the patient stay.    Goal: Optimal Comfort and Wellbeing  Outcome: Ongoing, Progressing  Pt had an uneventful shift.  No acute events overnight.  Pt denies pain while in bed.  VSS.  Uses call light appropriately.     Goal Outcome Evaluation:               Outcome Evaluation: Hospice consult is pending. Goal is home as soon as possible.

## 2022-02-19 NOTE — PROGRESS NOTES
"[facility] ACUTE PAIN SERVICE    ([facility], [facility], [facility])   Daily PAIN Progress Note    Assessment/Plan:  Amelia Fry is a 81 year old female who was admitted on 2/17/2022.   Pain Service is asked to see the patient for pain management for patient patient with cervical and rib fractures, history of multiple myeloma.   Admitted for evaluation of shortness of breath, COVID for over 2 weeks, fell 5 days ago, with worsening back pain.  Imaging demonstrate no acute pulmonary pathology, with acute vs subacute C3 compression fracture with pathologic component.      Patient's medical history significant for multiple myeloma follows with [facility], chronic back pain, hypothyroidism, hyponatremia, Glaucoma, conjunctivitis right eye, anemia of chronic disease, hypomagnesia.     Neurosurgery and Oncology have been Consulted.  Oncology met with patient with discussion regarding limited ongoing treatment options and recommending hospice support.     Palliative Care Consult which is pending.  Neurosurgery Plan:   Plan:  1. Orthotist consult for [place]  2. No MRI  3. Collar on at all times  4. Can get up with [place] until [place] arrives.   5. X-rays with [place] J in place        Over past 24 hours patient received 2 doses of OxyContin 30 mg and a prn dose of 10 mg oxycodone.  Pain Currently rated at a \"5\", rib pain has subsided since yesterday.      Plan to dismiss from hospital later today.  Will meet with hospice at 11 this AM.       Reviewed pain plan with patient and patient's son Nir.    Discussed increase in breakthrough pain medication of oxycodone from 5 mg prn to 5-10 mg every four hours prn.  Per  report patient last prescription of oxycodone 5 mg tablets #60 tablets.  Patient states that she has enough of this at home currently.    Improved pain with 10 mg oxycodone dose, sitting up in chair, wearing neck brace.    Tolerating the gabapentin addition, discussed increasing frequency " "to 300 mg bid over the next couple days.  Getting benefit from lidocaine cream.         PLAN:   1) Pain is consistent with acute pain due to fall with pathological fractures with history of chronic back pain due to multiple myeloma with multiple spinal fractures on daily opioid therapy see  report below.  The patient's home  MME was 120 mg daily.   2)Multimodal Medication Therapy  Topical: lidocaine cream qid prn   NSAID'S: avoid  Muscle Relaxants: consider  Adjuvants: tylenol 975 mg tid, gabapentin per    Antidepressants/anxiolytics: none  Opioids: oxycodone 5-10 mg every four hours prn  IV Pain medication:  OxyContin 30 mg bid adjusted to 18 and 06 for next 2 doses then back to 08 and 20  3)Non-medication interventions  Acupuncture consult- as available Mon and Thursday   Integrative consult - called referral to [phone]   4)Constipation Prophylaxis  Senna S bid   5) Follow up   -Opioid prescriber has been [facility]  -Discharge Recommendations - We recommend prescribing the following at the time of discharge:    Gabapentin 300 mg bid (liquid solution) start with 300 mg every hs, increase to 300 mg bid after two days  Oxycodone 5-10 mg every four hours prn (has prescription filled for 5 mg every four hours)   Continue home dose of Oxycontin 30 mg bid (has script filled)  Resume home bowel program of senna syrup daily and miralax daily prn)  lidocaine cream qid              Active Problems:    Chest mass    SOB (shortness of breath)    Pathological fracture of thoracic vertebra, initial encounter    Closed fracture of multiple ribs, unspecified laterality, initial encounter    Pathological fracture of cervical vertebra, initial encounter     LOS: 1 day       Subjective:  Patient reports pain is neck, shoulder and back pain reduced down from \"5\" to about a \"3\", able to tolerate neck brace.      acetaminophen  975 mg Oral TID     aspirin  81 mg Oral Daily     brimonidine  1 drop Right Eye BID "     dorzolamide-timolol  1 drop Right Eye BID     erythromycin   Right Eye Q8H CHADD     gabapentin  300 mg Oral Q12H CHADD     levothyroxine  100 mcg Oral QAM AC     lidocaine   Topical TID     netarsudil  1 drop Right Eye At Bedtime     oxyCODONE  30 mg Oral Q12H     senna-docusate  1 tablet Oral BID    Or     senna-docusate  2 tablet Oral BID     sodium chloride (PF)  3 mL Intracatheter Q8H       Objective:  Vital signs in last 24 hours:  Temp:  [98  F (36.7  C)-98.8  F (37.1  C)] 98  F (36.7  C)  Pulse:  [71-84] 84  Resp:  [16-18] 18  BP: (133-138)/(65-75) 138/75  SpO2:  [95 %-97 %] 97 %  Weight:   Weight change:   Body mass index is 17.31 kg/m .    Intake/Output last 3 shifts:  I/O last 3 completed shifts:  In: 120 [P.O.:120]  Out: 750 [Urine:750]  Intake/Output this shift:  I/O this shift:  In: 250 [P.O.:250]  Out: -     Review of Systems:   As per subjective, all others negative.    Physical Exam:    General Appearance:  Alert, cooperative, pleasant   Head:  Normocephalic, without obvious abnormality, atraumatic   Eyes:  Legally blind, PERRL, conjunctiva/corneas clear, EOM's intact   Nose: Nares normal, septum midline, mucosa normal, no drainage   Throat: Lips, mucosa, and tongue normal; teeth and gums normal   Neck: Aspen Collar on   Lungs:   respirations unlabored   Abdomen:   Soft, non-tender, non distended   Extremities: Extremities normal, atraumatic, no cyanosis or edema   Skin: Skin color, texture, turgor normal, no rashes or lesions   Neurologic: Alert and oriented X 3, Moves all 4 extremities          Imaging:  Personally Reviewed.  Cervical spine CT w/o contrast    Addendum Date: 2/17/2022    Addendum/ impression: Dr. Leonard discussed the results with Dr. Piña on 2/17/2022 9:21 PM by telephone.    Result Date: 2/17/2022  EXAM: CT CERVICAL SPINE W/O CONTRAST LOCATION: [facility] DATE/TIME: 2/17/2022 7:47 PM INDICATION: Back pain COMPARISON: Cervical spine MRI 12/08/2017  TECHNIQUE: Routine CT Cervical Spine without IV contrast. Multiplanar reformats. Dose reduction techniques were used.     IMPRESSION: 1.  Pathologic fracture involving the superior plate of C3, new since cervical spine MRI 12/08/2017, likely acute. 2.  Lucent  lesions throughout the cervical and partially imaged thoracic spine, compatible with the patient's reported history of myeloma. 3.  No other evidence of acute fracture or subluxation of the cervical spine by CT imaging. 4.  Chronic pathologic T2 compression fracture which was present on prior cervical spine MRI 12/08/2017.     CT Chest Pulmonary Embolism w Contrast    Result Date: 2/17/2022  EXAM: CT CHEST PULMONARY EMBOLISM W CONTRAST LOCATION: [facility] DATE/TIME: 2/17/2022 7:48 PM INDICATION: Shortness of breath. Multiple myeloma. COMPARISON: Chest CTA 01/17/2022 TECHNIQUE: CT chest pulmonary angiogram during arterial phase injection of IV contrast. Multiplanar reformats and MIP reconstructions were performed. Dose reduction techniques were used. CONTRAST: isovue 370 100ml     IMPRESSION: 1.  Diffuse myelomatous change in the skeleton including with multiple vertebral body compression fractures and multiple rib fractures with large expansile mass anteriorly on the right at rib 4. Interval callus formation about some of these pathological fractures since the study done a month ago. There are multiple others areas of rib destruction which likely explains her chest pain. 2.  No evidence of pulmonary emboli.    Head CT w/o contrast    Addendum Date: 2/17/2022    Addendum/ impression: There are a few lucent lesions throughout the calvarium, compatible the patient's reported history of myeloma.    Result Date: 2/17/2022  EXAM: CT HEAD W/O CONTRAST LOCATION: [facility] DATE/TIME: 2/17/2022 7:46 PM INDICATION: Fall, weakness COMPARISON: None. TECHNIQUE: Routine CT Head without IV contrast. Multiplanar reformats.  Dose reduction techniques were used.     IMPRESSION:  1.  No evidence of acute intracranial hemorrhage or mass effect. 2.  Mild nonspecific white matter changes. 3.  Mild brain parenchymal volume loss.    Lumbar spine CT w/o contrast    Result Date: 2/17/2022  EXAM: CT THORACIC SPINE W/O CONTRAST, CT LUMBAR SPINE W/O CONTRAST LOCATION: [facility] DATE/TIME: 2/17/2022 7:47 PM INDICATION: Mid-back pain, compression fracture suspected COMPARISON: Cervical spine CT performed same day. Cervical spine MRI 12/08/2017. Chest CT 01/17/2022 TECHNIQUE: Routine CT Thoracic and Lumbar Spine without IV contrast. Multiplanar reformats. Dose reduction techniques were used.     IMPRESSION:  Thoracic spine CT: 1.  Multiple lucencies lesions within the thoracic spine compatible with the patient's reported history of multiple myeloma. 2.  Multilevel compression fractures at T2, T4, T6-T9, T10, and T12, unchanged from 01/17/2022 3.  No definite evidence of new acute fracture or subluxation of the thoracic spine by CT imaging. Lumbar spine CT: 1.  Superior endplate L2 compression fracture, unchanged since chest CT 01/17/2022, though new since lumbar spine MRI 09/02/2021, age-indeterminate. 2.  No other evidence of acute fracture or subluxation of the lumbar spine by CT imaging. 3.  Chronic L1, L3 and L4 compression fractures, unchanged. 4.  Marrow replacing process within the S2 vertebral body and posterior elements, unchanged. 5.  Degenerative lumbar spondylosis with level by level analysis as described above.    CT Thoracic Spine w/o Contrast    Result Date: 2/17/2022  EXAM: CT THORACIC SPINE W/O CONTRAST, CT LUMBAR SPINE W/O CONTRAST LOCATION: [facility] DATE/TIME: 2/17/2022 7:47 PM INDICATION: Mid-back pain, compression fracture suspected COMPARISON: Cervical spine CT performed same day. Cervical spine MRI 12/08/2017. Chest CT 01/17/2022 TECHNIQUE: Routine CT Thoracic and Lumbar Spine  without IV contrast. Multiplanar reformats. Dose reduction techniques were used.     IMPRESSION:  Thoracic spine CT: 1.  Multiple lucencies lesions within the thoracic spine compatible with the patient's reported history of multiple myeloma. 2.  Multilevel compression fractures at T2, T4, T6-T9, T10, and T12, unchanged from 01/17/2022 3.  No definite evidence of new acute fracture or subluxation of the thoracic spine by CT imaging. Lumbar spine CT: 1.  Superior endplate L2 compression fracture, unchanged since chest CT 01/17/2022, though new since lumbar spine MRI 09/02/2021, age-indeterminate. 2.  No other evidence of acute fracture or subluxation of the lumbar spine by CT imaging. 3.  Chronic L1, L3 and L4 compression fractures, unchanged. 4.  Marrow replacing process within the S2 vertebral body and posterior elements, unchanged. 5.  Degenerative lumbar spondylosis with level by level analysis as described above.      Lab Results:  Personally Reviewed.   Recent Labs   Lab 02/17/22  1810   WBC 7.3   HGB 11.5*   HCT 35.4        Recent Labs   Lab 02/17/22  1810   *   CO2 21*   BUN 18   ALBUMIN 3.1*   ALKPHOS 81   ALT 11   AST 29     No results for input(s): INR in the last 168 hours.      Total time spent 25 minutes with greater than 50% in consultation, education and coordination of care.     Also discussed with RN and MD Mavis Larson APRN, CNS-BC, DNP  Acute Care Pain Management Program  [facility] ([place], [place], [place])   With questions call [phone]  Preference if for Gordon Larson  Click HERE to page Emilee

## 2022-02-19 NOTE — PLAN OF CARE
Problem: Risk for Delirium  Goal: Optimal Coping  Outcome: Ongoing, Progressing   Goal Outcome Evaluation:               Outcome Evaluation: Hospice consult is pending. Goal is home as soon as possible.      Remains stable.

## 2022-02-19 NOTE — PROGRESS NOTES
Pt discharging home with hospice. Went through AVS with pt and pt's son. Answered any questions/concerns the two have. All discharge medications sent with patient except for PO dialudid, which a paper prescription was sent with patient. Informed pt and son on how to fill this. All belongings and home medications sent with patient. Son transporting pt home    EVI MONAE RN

## 2022-02-19 NOTE — PROGRESS NOTES
[facility]    Medicine Progress Note - Hospitalist Service       Date of Admission:  2/17/2022  Active Problems:    Chest mass    SOB (shortness of breath)    Pathological fracture of thoracic vertebra, initial encounter    Closed fracture of multiple ribs, unspecified laterality, initial encounter    Pathological fracture of cervical vertebra, initial encounter     Assessment & Plan            Amelia Fry is a 81 year old female with past medical history significant for multiple myeloma, severe protein calorie malnutrition, hypothyroidism, and drug-induced polyneuropathy admitted on 2/17/2022 due to shortness of breath and a fall 1 week ago. She was found to have a chest wall mass, closed fractures of multiple ribs, and a new C3 pathologic fracture secondary to progressive multiple myeloma.  Patient is electing for hospice/comfort care patient     New C3 superior endplate pathologic fracture  Chronic back pain  Chronic opiate use  - CT cervical spine 2/17:1.  Pathologic fracture involving the superior plate of C3, new since cervical spine MRI 12/08/2017, likely acute.2.  Lucent  lesions throughout the cervical and partially imaged thoracic spine, compatible with the patient's reported history of myeloma  -CT thoracic 2/17: 1.  Multiple lucencies lesions within the thoracic spine compatible with the patient's reported history of multiple myeloma.2.  Multilevel compression fractures at T2, T4, T6-T9, T10, and T12, unchanged from 01/17/20223  -CT lumbar 2/17:1.  Superior endplate L2 compression fracture, unchanged since chest CT 01/17/2022, though new since lumbar spine MRI 09/02/2021, age-indeterminate.2. Chronic L1, L3 and L4 compression fractures, unchanged.4.  Marrow replacing process within the S2 vertebral body and posterior elements, unchanged.5.  Degenerative lumbar spondylosis   Neurosurgery consulted,  Recommending [place] cervical brace to be worn at all times, however with  plans for hospice can be worn as needed,     Pathological fractures of multiple ribs secondary to multiple myeloma  Expansile mass extending from the fourth rib on the right  Chest pain likely secondary to rib fractures/mass  -Patient has pain on palpation of the fourth and fifth ribs, no difficulty breathing associated  -CT chest 2/17: 1.  Diffuse myelomatous change in the skeleton including with multiple vertebral body compression fractures and multiple rib fractures with large expansile mass anteriorly on the right at rib 4. Interval callus formation about some of these pathological fractures since the study done a month ago. There are multiple others areas of rib destruction which likely explains her chest pain.  Secondary to multiple myeloma  Continuing home OxyContin, added on as needed oxycodone.  Pain team following.  Add on lidocaine patch as the chest wall pain is the worst symptom     Multiple myeloma  -Albumin 3.1, total protein 9.8, high A/G ratio 2.1  -Follows with [facility] last appointment was today  -Has been treated with daratumumab and Velcade in the past  -Multiple bone lesions as noted above  Seen by oncology, no further treatment, planning on hospice     Hyponatremia-Na 131  Asymptomatic.  Discontinue normal saline.  Now focusing on comfort     Hypothyroidism  -Continue home Synthroid 100 mcg before breakfast     Glaucoma  Conjunctivitis of the right eye  -Right eye has red conjunctiva, the upper and lower eyelids are also red, greenish discharge  -Patient stated that she is on 4 different medications for glaucoma and her eye appears very irritated, there is concern for either viral or bacterial conjunctivitis versus irritation from multiple medications  Continue home medications  Add on erythromycin     severe protein calorie malnutrition  Underweight  -Total protein is not indicative of her true protein state as she has multiple myeloma  -Patient is frail with muscle wasting, BMI is  17  -Nutrition consulted to evaluate     Mild anemia of chronic disease  -Hemoglobin 11.5, MCV 99     Hypomagnesemia- 1.7  Placed    Hypokalemia-replace     Goals of care: Patient wishes to focus on comfort and hospice, no further chemotherapy, no escalation of care, DNR/DNI.  Would like to avoid any further blood draws, still having chest wall pain with movement and back pain and left shoulder pain with movement.  Would like to discharge home with hospice tomorrow.  Discussed with care manager     Diet: Regular Diet Adult    DVT Prophylaxis: Enoxaparin (Lovenox) SQ  [place] Catheter: Not present  Central Lines: None  Code Status: No CPR- Do NOT Intubate      Disposition Plan   Expected Discharge: 02/21/2022     Anticipated discharge location:  Awaiting care coordination huddle  Delays:            The patient's care was discussed with the Bedside Nurse, Care Coordinator/, Patient and Patient's Family. for total time 35 minutes with greater than 50% of total time spent in counseling and coordination of care.    SHUN FELICIANO MD  Hospitalist Service  [facility]  Securely message with the Vocera Web Console (learn more here)  Text page via [employer] Paging/Directory        Clinically Significant Risk Factors Present on Admission         # Hyponatremia: Na = 131 mmol/L (Ref range: 136 - 145 mmol/L) on admission, will monitor as appropriate       # Platelet Defect: home medication list includes an antiplatelet medication       ______________________________________________________________________    Interval History   Remainder of 12 point review of systems negative except as noted below    Subjective:  Patient doing okay.  Having bilateral lower chest pain and left shoulder pain and mid back pain worse with movement.  Has chronic back pain that she takes OxyContin.  No fevers or chills.  No confusion.    Data reviewed today: I reviewed all medications, new labs and imaging results over  "the last 24 hours.     Physical Exam   Vital Signs: Temp: 98.8  F (37.1  C) Temp src: Oral BP: 133/66 Pulse: 78   Resp: 18 SpO2: 95 % O2 Device: None (Room air)    Weight: 104 lbs 0 oz  Physical Exam:  Temp:  [97.7  F (36.5  C)-98.8  F (37.1  C)] 98.8  F (37.1  C)  Pulse:  [] 78  Resp:  [16-22] 18  BP: (122-149)/(60-69) 133/66  SpO2:  [92 %-96 %] 95 %    /66 (BP Location: Left arm)   Pulse 78   Temp 98.8  F (37.1  C) (Oral)   Resp 18   Ht 1.651 m (5' 5\")   Wt 47.2 kg (104 lb)   SpO2 95%   BMI 17.31 kg/m    General appearance: alert, appears stated age, cachectic and cooperative  Head: Normocephalic, without obvious abnormality, atraumatic  Eyes: Erythematous right conjunctiva.  Pupils reactive  Neck: no JVD and supple, symmetrical, trachea midline  Lungs: clear to auscultation bilaterally  Heart: regular rate and rhythm, S1, S2 normal, no murmur, click, rub or gallop  Abdomen: soft, non-tender; bowel sounds normal; no masses,  no organomegaly  Extremities: Negative homans,   Skin: Skin color, texture, turgor normal. No rashes or lesions  Neurologic: Grossly normal  Tender to palpation in the bilateral lower chest walls        Data   Recent Labs   Lab 02/18/22  1334 02/18/22  0617 02/17/22  1810   WBC  --   --  7.3   HGB  --   --  11.5*   MCV  --   --  99   PLT  --   --  201   NA  --   --  131*   POTASSIUM 3.8 3.0* 3.5   CHLORIDE  --   --  103   CO2  --   --  21*   BUN  --   --  18   CR  --   --  0.70   ANIONGAP  --   --  7   ACACIA  --   --  9.2   GLC  --   --  141*   ALBUMIN  --   --  3.1*   PROTTOTAL  --   --  9.8*   BILITOTAL  --   --  0.5   ALKPHOS  --   --  81   ALT  --   --  11   AST  --   --  29     Recent Results (from the past 24 hour(s))   Head CT w/o contrast    Addendum: 2/17/2022    Addendum/  impression:    There are a few lucent lesions throughout the calvarium, compatible the patient's reported history of myeloma.      Narrative    EXAM: CT HEAD W/O CONTRAST  LOCATION: [facility]  DATE/TIME: 2/17/2022 7:46 PM    INDICATION: Fall, weakness  COMPARISON: None.  TECHNIQUE: Routine CT Head without IV contrast. Multiplanar reformats. Dose reduction techniques were used.    FINDINGS:  INTRACRANIAL CONTENTS: No evidence of acute intracranial hemorrhage or mass effect. Scattered foci of decreased attenuation within the cerebral hemispheric white matter which are nonspecific, though most commonly ascribed to chronic small vessel ischemic   disease. The ventricles and sulci are prominent consistent with mild brain parenchymal volume loss. Gray-white matter differentiation is maintained. The basilar cisterns are patent.    VISUALIZED ORBITS/SINUSES/MASTOIDS: Bilateral cataract surgery. Right scleral banding. Postsurgical changes left globe. The partially imaged paranasal sinuses and mastoid air cells are unremarkable.     BONES/SOFT TISSUES: The visualized skull base and calvarium are unremarkable.      Impression    IMPRESSION:    1.  No evidence of acute intracranial hemorrhage or mass effect.  2.  Mild nonspecific white matter changes.  3.  Mild brain parenchymal volume loss.   Cervical spine CT w/o contrast    Addendum: 2/17/2022    Addendum/  impression:    Dr. Leonard discussed the results with Dr. Piña on 2/17/2022 9:21 PM by telephone.      Narrative    EXAM: CT CERVICAL SPINE W/O CONTRAST  LOCATION: [facility]  DATE/TIME: 2/17/2022 7:47 PM    INDICATION: Back pain  COMPARISON: Cervical spine MRI 12/08/2017  TECHNIQUE: Routine CT Cervical Spine without IV contrast. Multiplanar reformats. Dose reduction techniques were used.    FINDINGS:  Pathologic fracture involving the superior plate of C3, new since cervical spine MRI 12/08/2017, likely acute. Chronic pathologic T2 compression fracture which was present on prior cervical spine MRI 12/08/2017. Subtle chronic superior endplate T1   compression fracture, unchanged from 12/18/2017. No  evidence of acute fracture or subluxation of the cervical spine by CT imaging. The vertebral bodies of the cervical spine otherwise have normal stature and alignment. Multilevel degenerative disc   disease of the cervical spine with disc height loss, most pronounced at C5/C6. The partially imaged intracranial contents are unremarkable. No prevertebral soft tissue swelling. The partially imaged lung apices are unremarkable.     Craniovertebral junction and C1-C2: The odontoid process is well approximated with the anterior body of C1 and well aligned between the lateral masses of C1.    C2-C3: No posterior disc bulge or spinal canal narrowing. No neural foraminal narrowing.     C3-C4: No posterior disc bulge or spinal canal narrowing. Uncovertebral joint disease and facet arthropathy with moderate right neural foraminal narrowing. No left neural foraminal narrowing.     C4-C5: No posterior disc bulge or spinal canal narrowing. Uncovertebral joint disease and facet arthropathy with mild right neural foraminal narrowing. No left neural foraminal narrowing.     C5-C6: No posterior disc bulge or spinal canal narrowing. Uncovertebral joint disease and facet arthropathy with severe right and mild left neural foraminal narrowing.     C6-C7: No posterior disc bulge or spinal canal narrowing. No neural foraminal narrowing.     C7-T1: No posterior disc bulge or spinal canal narrowing. No neural foraminal narrowing.       Impression    IMPRESSION:  1.  Pathologic fracture involving the superior plate of C3, new since cervical spine MRI 12/08/2017, likely acute.  2.  Lucent  lesions throughout the cervical and partially imaged thoracic spine, compatible with the patient's reported history of myeloma.   3.  No other evidence of acute fracture or subluxation of the cervical spine by CT imaging.  4.  Chronic pathologic T2 compression fracture which was present on prior cervical spine MRI 12/08/2017.    CT Thoracic Spine w/o Contrast     Narrative    EXAM: CT THORACIC SPINE W/O CONTRAST, CT LUMBAR SPINE W/O CONTRAST  LOCATION: [facility]  DATE/TIME: 2/17/2022 7:47 PM    INDICATION: Mid-back pain, compression fracture suspected  COMPARISON: Cervical spine CT performed same day. Cervical spine MRI 12/08/2017. Chest CT 01/17/2022  TECHNIQUE: Routine CT Thoracic and Lumbar Spine without IV contrast. Multiplanar reformats. Dose reduction techniques were used.     FINDINGS:  Thoracic spine CT:  Chronic T2 pathologic fracture, unchanged from cervical spine MRI 12/08/2017. Lucent lesions throughout the thoracic spine compatible with the patient's reported history of multiple myeloma. Pathologic compression fractures at T4 T6, T7, T8 T9 and T10,   and T12 unchanged from 01/17/2022. Chronic superior endplate T12 compression fracture, unchanged. No definite evidence of new acute fracture or subluxation of the thoracic spine by CT imaging., However given the patient's extensive myeloma, if there is   concern for acute fracture, MR would be more sensitive.    The partially imaged lungs are unremarkable.    Lumbar spine CT:  Lucent lesions throughout the lumbar spine compatible with the patient's reported history of myeloma, marrow replacing process with the S2 vertebral body and posterior elements, unchanged. Superior endplate L2 compression fracture,, unchanged since chest   CT 01/17/2022, though new since lumbar spine MRI 09/02/2021, age-indeterminate. Chronic L1, L3 and L4 compression fractures, unchanged. No other definite evidence of acute fracture or subluxation of the lumbar spine by CT imaging.    T12/L1:  Symmetric disc bulge without spinal canal narrowing. No neural foraminal narrowing.    L1/L2:  Symmetric disc bulge and mild facet arthropathy without spinal canal narrowing. No neural foraminal narrowing.    L2/L3:  Symmetric disc bulge without spinal canal narrowing. Mild bilateral neural foraminal narrowing.    L3/L4:   Symmetric disc bulge and moderate facet arthropathy without spinal canal narrowing. No neural foraminal narrowing.      L4/L5:  Symmetric disc bulge and moderate facet arthropathy without spinal canal narrowing. Moderate to severe right and moderate left neural foraminal narrowing.     L5/S1: Symmetric disc bulge and moderate facet arthropathy without spinal canal narrowing. No neural foraminal narrowing.       Impression    IMPRESSION:    Thoracic spine CT:  1.  Multiple lucencies lesions within the thoracic spine compatible with the patient's reported history of multiple myeloma.  2.  Multilevel compression fractures at T2, T4, T6-T9, T10, and T12, unchanged from 01/17/2022  3.  No definite evidence of new acute fracture or subluxation of the thoracic spine by CT imaging.    Lumbar spine CT:  1.  Superior endplate L2 compression fracture, unchanged since chest CT 01/17/2022, though new since lumbar spine MRI 09/02/2021, age-indeterminate.  2.  No other evidence of acute fracture or subluxation of the lumbar spine by CT imaging.  3.  Chronic L1, L3 and L4 compression fractures, unchanged.  4.  Marrow replacing process within the S2 vertebral body and posterior elements, unchanged.  5.  Degenerative lumbar spondylosis with level by level analysis as described above.   Lumbar spine CT w/o contrast    Narrative    EXAM: CT THORACIC SPINE W/O CONTRAST, CT LUMBAR SPINE W/O CONTRAST  LOCATION: [facility]  DATE/TIME: 2/17/2022 7:47 PM    INDICATION: Mid-back pain, compression fracture suspected  COMPARISON: Cervical spine CT performed same day. Cervical spine MRI 12/08/2017. Chest CT 01/17/2022  TECHNIQUE: Routine CT Thoracic and Lumbar Spine without IV contrast. Multiplanar reformats. Dose reduction techniques were used.     FINDINGS:  Thoracic spine CT:  Chronic T2 pathologic fracture, unchanged from cervical spine MRI 12/08/2017. Lucent lesions throughout the thoracic spine compatible with the  patient's reported history of multiple myeloma. Pathologic compression fractures at T4 T6, T7, T8 T9 and T10,   and T12 unchanged from 01/17/2022. Chronic superior endplate T12 compression fracture, unchanged. No definite evidence of new acute fracture or subluxation of the thoracic spine by CT imaging., However given the patient's extensive myeloma, if there is   concern for acute fracture, MR would be more sensitive.    The partially imaged lungs are unremarkable.    Lumbar spine CT:  Lucent lesions throughout the lumbar spine compatible with the patient's reported history of myeloma, marrow replacing process with the S2 vertebral body and posterior elements, unchanged. Superior endplate L2 compression fracture,, unchanged since chest   CT 01/17/2022, though new since lumbar spine MRI 09/02/2021, age-indeterminate. Chronic L1, L3 and L4 compression fractures, unchanged. No other definite evidence of acute fracture or subluxation of the lumbar spine by CT imaging.    T12/L1:  Symmetric disc bulge without spinal canal narrowing. No neural foraminal narrowing.    L1/L2:  Symmetric disc bulge and mild facet arthropathy without spinal canal narrowing. No neural foraminal narrowing.    L2/L3:  Symmetric disc bulge without spinal canal narrowing. Mild bilateral neural foraminal narrowing.    L3/L4:  Symmetric disc bulge and moderate facet arthropathy without spinal canal narrowing. No neural foraminal narrowing.      L4/L5:  Symmetric disc bulge and moderate facet arthropathy without spinal canal narrowing. Moderate to severe right and moderate left neural foraminal narrowing.     L5/S1: Symmetric disc bulge and moderate facet arthropathy without spinal canal narrowing. No neural foraminal narrowing.       Impression    IMPRESSION:    Thoracic spine CT:  1.  Multiple lucencies lesions within the thoracic spine compatible with the patient's reported history of multiple myeloma.  2.  Multilevel compression fractures at T2,  T4, T6-T9, T10, and T12, unchanged from 01/17/2022  3.  No definite evidence of new acute fracture or subluxation of the thoracic spine by CT imaging.    Lumbar spine CT:  1.  Superior endplate L2 compression fracture, unchanged since chest CT 01/17/2022, though new since lumbar spine MRI 09/02/2021, age-indeterminate.  2.  No other evidence of acute fracture or subluxation of the lumbar spine by CT imaging.  3.  Chronic L1, L3 and L4 compression fractures, unchanged.  4.  Marrow replacing process within the S2 vertebral body and posterior elements, unchanged.  5.  Degenerative lumbar spondylosis with level by level analysis as described above.   CT Chest Pulmonary Embolism w Contrast    Narrative    EXAM: CT CHEST PULMONARY EMBOLISM W CONTRAST  LOCATION: [facility]  DATE/TIME: 2/17/2022 7:48 PM    INDICATION: Shortness of breath. Multiple myeloma.  COMPARISON: Chest CTA 01/17/2022  TECHNIQUE: CT chest pulmonary angiogram during arterial phase injection of IV contrast. Multiplanar reformats and MIP reconstructions were performed. Dose reduction techniques were used.   CONTRAST: isovue 370 100ml    FINDINGS:  ANGIOGRAM CHEST: Excellent opacification of pulmonary arteries and branches. No evidence of pulmonary emboli. Thoracic aorta is slightly ectatic at 3.8 cm, unchanged.    LUNGS AND PLEURA: Mild dependent atelectasis. No effusions or suspicious lesions.    MEDIASTINUM/AXILLAE: No adenopathy.    CORONARY ARTERY CALCIFICATION: Cannot evaluate.    UPPER ABDOMEN: Normal.    MUSCULOSKELETAL: Bones are extremely demineralized with diffuse lytic lesions throughout. There are multiple ribs with areas of cortical breakthrough and large expansile lytic mass is seen again involving the anterior right fourth rib. Interval callus   formation about one of the pathological fractures laterally on the left  at T7 on and on the right at T5 anteriorly. Multiple thoracic vertebral body compression fractures  are again noted with vertebral plana at T9, unchanged. Chronic, upper sternal   fracture noted.      Impression    IMPRESSION:  1.  Diffuse myelomatous change in the skeleton including with multiple vertebral body compression fractures and multiple rib fractures with large expansile mass anteriorly on the right at rib 4. Interval callus formation about some of these pathological   fractures since the study done a month ago. There are multiple others areas of rib destruction which likely explains her chest pain.  2.  No evidence of pulmonary emboli.

## 2022-02-19 NOTE — CONSULTS
[facility] appreciates this referral.  Writer will be meeting with sonNir and patient today, 2/19/2022 in patient's room at 1100 am to discuss hospice philosophy of care and discharge planning. [facility] hospice is available for any questions or concerns.      Thank you for the referral,     Crystal Hammer, RN, BSN, PHN   RN Hospice Referral Specialist   [facility]   [phone]   24 hour line: [phone]

## 2022-02-21 ENCOUNTER — PATIENT OUTREACH (OUTPATIENT)
Dept: CARE COORDINATION | Facility: CLINIC | Age: 82
End: 2022-02-21
Payer: COMMERCIAL

## 2022-02-21 NOTE — PROGRESS NOTES
Clinic Care Coordination Contact  Care Team Conversations    Patient identified for care management outreach, however [facility] RN staff has already followed up with patient to ensure they are following up with PCP and have needs and resources met. Clinic RN will refer back to ARLENE LEO if needed/appropriate.    LUCRECIA Loza  Social Work Care Coordinator - [facility]  Care Coordination  [email or web address]  Cell Phone: [phone]  Gender pronouns: she/her  Employed by [employer]

## 2022-02-21 NOTE — DISCHARGE SUMMARY
[facility]  Hospitalist Discharge Summary      Date of Admission:  2/17/2022  Date of Discharge:  2/19/2022  2:07 PM  Discharging Provider: SHUN FELICIANO MD      Discharge Diagnoses   Active Problems:    Chest mass    SOB (shortness of breath)    Pathological fracture of thoracic vertebra, initial encounter    Closed fracture of multiple ribs, unspecified laterality, initial encounter    Pathological fracture of cervical vertebra, initial encounter       Discharge Procedure Orders   Primary Care - Care Coordination Referral   Standing Status: Future   Referral Priority: Routine: Next available opening Referral Type: Care Coordination   Number of Visits Requested: 1     Reason for your hospital stay   Order Comments: rib fractures,     Follow-up and recommended labs and tests   Order Comments: Follow up with hospice as planned     Activity   Order Comments: Your activity upon discharge: activity as tolerated.  Wear neck brace as needed     Order Specific Question Answer Comments   Is discharge order? Yes      Diet   Order Comments: Follow this diet upon discharge: Orders Placed This Encounter      Regular Diet Adult-soft diet     Order Specific Question Answer Comments   Is discharge order? Yes           Hospital Course   Amelia Fry is a 81 year old female with past medical history significant for multiple myeloma, severe protein calorie malnutrition, hypothyroidism, and drug-induced polyneuropathy admitted on 2/17/2022 due to shortness of breath and a fall 1 week ago. She was found to have a chest wall mass, closed fractures of multiple ribs, and a new C3 pathologic fracture secondary to progressive multiple myeloma.  Patient is electing for hospice/comfort care      New C3 superior endplate pathologic fracture with chronc T and L fractures  Chronic back pain  Chronic opiate use  - CT cervical spine 2/17:1.  Pathologic fracture involving the superior plate of C3, new since cervical  spine MRI 12/08/2017, likely acute.2.  Lucent  lesions throughout the cervical and partially imaged thoracic spine, compatible with the patient's reported history of myeloma  -CT thoracic 2/17: 1.  Multiple lucencies lesions within the thoracic spine compatible with the patient's reported history of multiple myeloma.2.  Multilevel compression fractures at T2, T4, T6-T9, T10, and T12, unchanged from 01/17/20223  -CT lumbar 2/17:1.  Superior endplate L2 compression fracture, unchanged since chest CT 01/17/2022, though new since lumbar spine MRI 09/02/2021, age-indeterminate.2. Chronic L1, L3 and L4 compression fractures, unchanged.4.  Marrow replacing process within the S2 vertebral body and posterior elements, unchanged.5.  Degenerative lumbar spondylosis   Neurosurgery consulted,  Recommending [place] cervical brace to be worn at all times, however with plans for hospice can be worn as needed,  No sig weakness     Pathological fractures of multiple ribs secondary to multiple myeloma  Expansile mass extending from the fourth rib on the right  Chest pain likely secondary to rib fractures/mass  -Patient has pain on palpation of the fourth and fifth ribs, no difficulty breathing associated  -CT chest 2/17: 1.  Diffuse myelomatous change in the skeleton including with multiple vertebral body compression fractures and multiple rib fractures with large expansile mass anteriorly on the right at rib 4. Interval callus formation about some of these pathological fractures since the study done a month ago. There are multiple others areas of rib destruction which likely explains her chest pain.  Secondary to multiple myeloma  Continuing home OxyContin, as needed oxycodone.  Pain team following.  Add on lidocaine patch as the chest wall pain is the worst symptom. Sx controlled at discharge     Multiple myeloma  -Albumin 3.1, total protein 9.8, high A/G ratio 2.1  -Follows with [place] [facility] last appointment was today  -Has been  treated with daratumumab and Velcade in the past  -Multiple bone lesions as noted above  Seen by oncology, no further treatment, planning on hospice     Hyponatremia-Na 131  Asymptomatic.  Discontinue normal saline.  Now focusing on comfort     Hypothyroidism  -Continue home Synthroid 100 mcg before breakfast     Glaucoma  Conjunctivitis of the right eye  -Right eye has red conjunctiva, the upper and lower eyelids are also red, greenish discharge  -Patient stated that she is on 4 different medications for glaucoma and her eye appears very irritated, there is concern for either viral or bacterial conjunctivitis versus irritation from multiple medications  Continue home medications  Add on erythromycin -improved     severe protein calorie malnutrition  Underweight  -Total protein is not indicative of her true protein state as she has multiple myeloma  -Patient is frail with muscle wasting, BMI is 17  -Nutrition consulted to evaluate     Mild anemia of chronic disease  -Hemoglobin 11.5, MCV 99     Hypomagnesemia- 1.7  Replaced     Hypokalemia-replaced     Goals of care: Patient wishes to focus on comfort and hospice, no further chemotherapy, no escalation of care, DNR/DNI.  Would like to avoid any further blood draws, still having chest wall pain with movement and back pain and left shoulder pain with movement.  Would like to discharge home with hospice today.  Discussed with care manager    Consultations This Hospital Stay   NEUROSURGERY IP CONSULT  PHYSICAL THERAPY ADULT IP CONSULT  OCCUPATIONAL THERAPY ADULT IP CONSULT  NUTRITION SERVICES ADULT IP CONSULT  CARE MANAGEMENT / SOCIAL WORK IP CONSULT  HEMATOLOGY & ONCOLOGY IP CONSULT  PAIN MANAGEMENT ADULT IP CONSULT  PALLIATIVE CARE ADULT IP CONSULT  ORTHOSIS BRACE IP CONSULT  INPATIENT HOSPICE ADULT CONSULT    Code Status   Prior         Greater than 35 minutes spent on coordinating discharge, evaluating labs, studies, evaluating patient, evaluating specialist  notes    SHUN FELICIANO MD  [address] [address]  Phone: [phone]  Fax: [phone]  ______________________________________________________________________         Primary Care Physician   Roland Henriquez    Discharge Orders      Primary Care - Care Coordination Referral      Reason for your hospital stay    rib fractures,     Follow-up and recommended labs and tests    Follow up with hospice as planned     Activity    Your activity upon discharge: activity as tolerated.  Wear neck brace as needed     Diet    Follow this diet upon discharge: Orders Placed This Encounter      Regular Diet Adult-soft diet       Significant Results and Procedures   Most Recent 3 CBC's:Recent Labs   Lab Test 02/17/22  1810 01/20/22  0549 01/19/22  0635   WBC 7.3 4.7 6.8   HGB 11.5* 12.3 13.3   MCV 99 101* 101*    175 187     Most Recent 3 BMP's:Recent Labs   Lab Test 02/18/22  1334 02/18/22  0617 02/17/22  1810 02/08/22  1438 01/20/22  0549   NA  --   --  131* 134* 135*   POTASSIUM 3.8 3.0* 3.5 3.9 3.5   CHLORIDE  --   --  103 103 105   CO2  --   --  21* 18* 24   BUN  --   --  18 17 21   CR  --   --  0.70 0.68 0.58*   ANIONGAP  --   --  7 13 6   ACACIA  --   --  9.2 9.0 8.0*   GLC  --   --  141* 114 103     Most Recent 2 LFT's:Recent Labs   Lab Test 02/17/22  1810 02/08/22  1438   AST 29 37   ALT 11 33   ALKPHOS 81 117   BILITOTAL 0.5 0.4   ,   Results for orders placed or performed during the hospital encounter of 02/17/22   Head CT w/o contrast    Addendum: 2/17/2022    Addendum/  impression:    There are a few lucent lesions throughout the calvarium, compatible the patient's reported history of myeloma.      Narrative    EXAM: CT HEAD W/O CONTRAST  LOCATION: [facility]  DATE/TIME: 2/17/2022 7:46 PM    INDICATION: Fall, weakness  COMPARISON: None.  TECHNIQUE: Routine CT Head without IV contrast. Multiplanar reformats. Dose reduction  techniques were used.    FINDINGS:  INTRACRANIAL CONTENTS: No evidence of acute intracranial hemorrhage or mass effect. Scattered foci of decreased attenuation within the cerebral hemispheric white matter which are nonspecific, though most commonly ascribed to chronic small vessel ischemic   disease. The ventricles and sulci are prominent consistent with mild brain parenchymal volume loss. Gray-white matter differentiation is maintained. The basilar cisterns are patent.    VISUALIZED ORBITS/SINUSES/MASTOIDS: Bilateral cataract surgery. Right scleral banding. Postsurgical changes left globe. The partially imaged paranasal sinuses and mastoid air cells are unremarkable.     BONES/SOFT TISSUES: The visualized skull base and calvarium are unremarkable.      Impression    IMPRESSION:    1.  No evidence of acute intracranial hemorrhage or mass effect.  2.  Mild nonspecific white matter changes.  3.  Mild brain parenchymal volume loss.   Cervical spine CT w/o contrast    Addendum: 2/17/2022    Addendum/  impression:    Dr. Leonard discussed the results with Dr. Piña on 2/17/2022 9:21 PM by telephone.      Narrative    EXAM: CT CERVICAL SPINE W/O CONTRAST  LOCATION: [facility]  DATE/TIME: 2/17/2022 7:47 PM    INDICATION: Back pain  COMPARISON: Cervical spine MRI 12/08/2017  TECHNIQUE: Routine CT Cervical Spine without IV contrast. Multiplanar reformats. Dose reduction techniques were used.    FINDINGS:  Pathologic fracture involving the superior plate of C3, new since cervical spine MRI 12/08/2017, likely acute. Chronic pathologic T2 compression fracture which was present on prior cervical spine MRI 12/08/2017. Subtle chronic superior endplate T1   compression fracture, unchanged from 12/18/2017. No evidence of acute fracture or subluxation of the cervical spine by CT imaging. The vertebral bodies of the cervical spine otherwise have normal stature and alignment. Multilevel degenerative disc    disease of the cervical spine with disc height loss, most pronounced at C5/C6. The partially imaged intracranial contents are unremarkable. No prevertebral soft tissue swelling. The partially imaged lung apices are unremarkable.     Craniovertebral junction and C1-C2: The odontoid process is well approximated with the anterior body of C1 and well aligned between the lateral masses of C1.    C2-C3: No posterior disc bulge or spinal canal narrowing. No neural foraminal narrowing.     C3-C4: No posterior disc bulge or spinal canal narrowing. Uncovertebral joint disease and facet arthropathy with moderate right neural foraminal narrowing. No left neural foraminal narrowing.     C4-C5: No posterior disc bulge or spinal canal narrowing. Uncovertebral joint disease and facet arthropathy with mild right neural foraminal narrowing. No left neural foraminal narrowing.     C5-C6: No posterior disc bulge or spinal canal narrowing. Uncovertebral joint disease and facet arthropathy with severe right and mild left neural foraminal narrowing.     C6-C7: No posterior disc bulge or spinal canal narrowing. No neural foraminal narrowing.     C7-T1: No posterior disc bulge or spinal canal narrowing. No neural foraminal narrowing.       Impression    IMPRESSION:  1.  Pathologic fracture involving the superior plate of C3, new since cervical spine MRI 12/08/2017, likely acute.  2.  Lucent  lesions throughout the cervical and partially imaged thoracic spine, compatible with the patient's reported history of myeloma.   3.  No other evidence of acute fracture or subluxation of the cervical spine by CT imaging.  4.  Chronic pathologic T2 compression fracture which was present on prior cervical spine MRI 12/08/2017.    CT Thoracic Spine w/o Contrast    Narrative    EXAM: CT THORACIC SPINE W/O CONTRAST, CT LUMBAR SPINE W/O CONTRAST  LOCATION: [facility]  DATE/TIME: 2/17/2022 7:47 PM    INDICATION: Mid-back pain,  compression fracture suspected  COMPARISON: Cervical spine CT performed same day. Cervical spine MRI 12/08/2017. Chest CT 01/17/2022  TECHNIQUE: Routine CT Thoracic and Lumbar Spine without IV contrast. Multiplanar reformats. Dose reduction techniques were used.     FINDINGS:  Thoracic spine CT:  Chronic T2 pathologic fracture, unchanged from cervical spine MRI 12/08/2017. Lucent lesions throughout the thoracic spine compatible with the patient's reported history of multiple myeloma. Pathologic compression fractures at T4 T6, T7, T8 T9 and T10,   and T12 unchanged from 01/17/2022. Chronic superior endplate T12 compression fracture, unchanged. No definite evidence of new acute fracture or subluxation of the thoracic spine by CT imaging., However given the patient's extensive myeloma, if there is   concern for acute fracture, MR would be more sensitive.    The partially imaged lungs are unremarkable.    Lumbar spine CT:  Lucent lesions throughout the lumbar spine compatible with the patient's reported history of myeloma, marrow replacing process with the S2 vertebral body and posterior elements, unchanged. Superior endplate L2 compression fracture,, unchanged since chest   CT 01/17/2022, though new since lumbar spine MRI 09/02/2021, age-indeterminate. Chronic L1, L3 and L4 compression fractures, unchanged. No other definite evidence of acute fracture or subluxation of the lumbar spine by CT imaging.    T12/L1:  Symmetric disc bulge without spinal canal narrowing. No neural foraminal narrowing.    L1/L2:  Symmetric disc bulge and mild facet arthropathy without spinal canal narrowing. No neural foraminal narrowing.    L2/L3:  Symmetric disc bulge without spinal canal narrowing. Mild bilateral neural foraminal narrowing.    L3/L4:  Symmetric disc bulge and moderate facet arthropathy without spinal canal narrowing. No neural foraminal narrowing.      L4/L5:  Symmetric disc bulge and moderate facet arthropathy without  spinal canal narrowing. Moderate to severe right and moderate left neural foraminal narrowing.     L5/S1: Symmetric disc bulge and moderate facet arthropathy without spinal canal narrowing. No neural foraminal narrowing.       Impression    IMPRESSION:    Thoracic spine CT:  1.  Multiple lucencies lesions within the thoracic spine compatible with the patient's reported history of multiple myeloma.  2.  Multilevel compression fractures at T2, T4, T6-T9, T10, and T12, unchanged from 01/17/2022  3.  No definite evidence of new acute fracture or subluxation of the thoracic spine by CT imaging.    Lumbar spine CT:  1.  Superior endplate L2 compression fracture, unchanged since chest CT 01/17/2022, though new since lumbar spine MRI 09/02/2021, age-indeterminate.  2.  No other evidence of acute fracture or subluxation of the lumbar spine by CT imaging.  3.  Chronic L1, L3 and L4 compression fractures, unchanged.  4.  Marrow replacing process within the S2 vertebral body and posterior elements, unchanged.  5.  Degenerative lumbar spondylosis with level by level analysis as described above.   Lumbar spine CT w/o contrast    Narrative    EXAM: CT THORACIC SPINE W/O CONTRAST, CT LUMBAR SPINE W/O CONTRAST  LOCATION: [facility]  DATE/TIME: 2/17/2022 7:47 PM    INDICATION: Mid-back pain, compression fracture suspected  COMPARISON: Cervical spine CT performed same day. Cervical spine MRI 12/08/2017. Chest CT 01/17/2022  TECHNIQUE: Routine CT Thoracic and Lumbar Spine without IV contrast. Multiplanar reformats. Dose reduction techniques were used.     FINDINGS:  Thoracic spine CT:  Chronic T2 pathologic fracture, unchanged from cervical spine MRI 12/08/2017. Lucent lesions throughout the thoracic spine compatible with the patient's reported history of multiple myeloma. Pathologic compression fractures at T4 T6, T7, T8 T9 and T10,   and T12 unchanged from 01/17/2022. Chronic superior endplate T12 compression  fracture, unchanged. No definite evidence of new acute fracture or subluxation of the thoracic spine by CT imaging., However given the patient's extensive myeloma, if there is   concern for acute fracture, MR would be more sensitive.    The partially imaged lungs are unremarkable.    Lumbar spine CT:  Lucent lesions throughout the lumbar spine compatible with the patient's reported history of myeloma, marrow replacing process with the S2 vertebral body and posterior elements, unchanged. Superior endplate L2 compression fracture,, unchanged since chest   CT 01/17/2022, though new since lumbar spine MRI 09/02/2021, age-indeterminate. Chronic L1, L3 and L4 compression fractures, unchanged. No other definite evidence of acute fracture or subluxation of the lumbar spine by CT imaging.    T12/L1:  Symmetric disc bulge without spinal canal narrowing. No neural foraminal narrowing.    L1/L2:  Symmetric disc bulge and mild facet arthropathy without spinal canal narrowing. No neural foraminal narrowing.    L2/L3:  Symmetric disc bulge without spinal canal narrowing. Mild bilateral neural foraminal narrowing.    L3/L4:  Symmetric disc bulge and moderate facet arthropathy without spinal canal narrowing. No neural foraminal narrowing.      L4/L5:  Symmetric disc bulge and moderate facet arthropathy without spinal canal narrowing. Moderate to severe right and moderate left neural foraminal narrowing.     L5/S1: Symmetric disc bulge and moderate facet arthropathy without spinal canal narrowing. No neural foraminal narrowing.       Impression    IMPRESSION:    Thoracic spine CT:  1.  Multiple lucencies lesions within the thoracic spine compatible with the patient's reported history of multiple myeloma.  2.  Multilevel compression fractures at T2, T4, T6-T9, T10, and T12, unchanged from 01/17/2022  3.  No definite evidence of new acute fracture or subluxation of the thoracic spine by CT imaging.    Lumbar spine CT:  1.  Superior  endplate L2 compression fracture, unchanged since chest CT 01/17/2022, though new since lumbar spine MRI 09/02/2021, age-indeterminate.  2.  No other evidence of acute fracture or subluxation of the lumbar spine by CT imaging.  3.  Chronic L1, L3 and L4 compression fractures, unchanged.  4.  Marrow replacing process within the S2 vertebral body and posterior elements, unchanged.  5.  Degenerative lumbar spondylosis with level by level analysis as described above.   CT Chest Pulmonary Embolism w Contrast    Narrative    EXAM: CT CHEST PULMONARY EMBOLISM W CONTRAST  LOCATION: [facility]  DATE/TIME: 2/17/2022 7:48 PM    INDICATION: Shortness of breath. Multiple myeloma.  COMPARISON: Chest CTA 01/17/2022  TECHNIQUE: CT chest pulmonary angiogram during arterial phase injection of IV contrast. Multiplanar reformats and MIP reconstructions were performed. Dose reduction techniques were used.   CONTRAST: isovue 370 100ml    FINDINGS:  ANGIOGRAM CHEST: Excellent opacification of pulmonary arteries and branches. No evidence of pulmonary emboli. Thoracic aorta is slightly ectatic at 3.8 cm, unchanged.    LUNGS AND PLEURA: Mild dependent atelectasis. No effusions or suspicious lesions.    MEDIASTINUM/AXILLAE: No adenopathy.    CORONARY ARTERY CALCIFICATION: Cannot evaluate.    UPPER ABDOMEN: Normal.    MUSCULOSKELETAL: Bones are extremely demineralized with diffuse lytic lesions throughout. There are multiple ribs with areas of cortical breakthrough and large expansile lytic mass is seen again involving the anterior right fourth rib. Interval callus   formation about one of the pathological fractures laterally on the left  at T7 on and on the right at T5 anteriorly. Multiple thoracic vertebral body compression fractures are again noted with vertebral plana at T9, unchanged. Chronic, upper sternal   fracture noted.      Impression    IMPRESSION:  1.  Diffuse myelomatous change in the skeleton including  with multiple vertebral body compression fractures and multiple rib fractures with large expansile mass anteriorly on the right at rib 4. Interval callus formation about some of these pathological   fractures since the study done a month ago. There are multiple others areas of rib destruction which likely explains her chest pain.  2.  No evidence of pulmonary emboli.       Discharge Medications   Discharge Medication List as of 2/19/2022 12:47 PM      START taking these medications    Details   bisacodyl (DULCOLAX) 10 MG suppository Place 1 suppository (10 mg) rectally daily as needed for constipation, Disp-2 suppository, R-11, E-Prescribe      erythromycin (ROMYCIN) 5 MG/GM ophthalmic ointment Place into the right eye every 8 hours for 7 daysDisp-1 g, [id number]      gabapentin (NEURONTIN) 250 MG/5ML solution Take 6 mLs (300 mg) by mouth every 12 hours, Disp-470 mL, R-0, E-Prescribe      haloperidol (HALDOL) 2 MG/ML (HIGH CONC) solution Take 0.25-0.5 mLs (0.5-1 mg) by mouth every 6 hours as needed for agitation or other (nausea), Disp-10 mL, R-0, Local Print      HYDROmorphone, HIGH CONC, (DILAUDID) 10 mg/mL LIQD oral Take 0.1-0.2 mLs (1-2 mg) by mouth or place under tongue every 2 hours as needed for other (pain or shortness of breath/dyspnea), Disp-30 mL, R-0, Local PrintHospice patient. Dispense in quantities of 30 mL.      lidocaine (XYLOCAINE) 5 % external ointment Apply topically 4 times dailyDisp-50 g, [id number]      LORazepam (ATIVAN) 2 MG/ML (HIGH CONC) oral solution Take 0.125-0.25 mLs (0.25-0.5 mg) by mouth or place under tongue every 4 hours as needed for anxiety (restlessness), Disp-30 mL, R-0, Local Print      senna (SENNA LAXATIVE) 8.6 MG tablet Take 1-2 tablets by mouth 2 times daily as needed for constipation, Disp-100 tablet, R-11, E-Prescribe         CONTINUE these medications which have CHANGED    Details   acetaminophen (TYLENOL) 325 MG tablet Take 3 tablets (975 mg) by mouth 3 times  daily, OTC         CONTINUE these medications which have NOT CHANGED    Details   aspirin 81 mg chewable tablet Take 81 mg by mouth daily , Historical      brimonidine (ALPHAGAN P) 0.1 % ophthalmic solution Place 1 drop into the right eye 2 times daily , Historical      dorzolamide-timolol (COSOPT) 2-0.5 % ophthalmic solution Place 1 drop into the right eye 2 times daily, Historical      levothyroxine (SYNTHROID, LEVOTHROID) 100 MCG tablet Take 100 mcg by mouth daily before breakfast , Historical      loperamide (IMODIUM) 2 MG capsule Take 2 mg by mouth 4 times daily as needed for diarrhea , Historical      MELALEUCA SC Take by mouth daily Melaleuca vitamin supplements containing - Multivitamin, minerals, calcium, Historical      netarsudil (RHOPRESSA) 0.02 % ophthalmic solution Place 1 drop into the right eye At Bedtime, Historical      ondansetron (ZOFRAN) 8 MG tablet Take 8 mg by mouth every 8 hours as needed for nausea, Historical      oxyCODONE (ROXICODONE) 5 MG tablet Take 1-2 tablets by mouth every 4 hours as needed for pain, Historical      OXYCONTIN 30 MG 12 hr tablet Take 30 mg by mouth every 12 hours, YEMI, Historical      sennosides (SENOKOT) 8.8 MG/5ML syrup Take 5 mLs by mouth 2 times daily, Disp-236 mL, R-0, E-Prescribe      tafluprost, PF, (ZIOPTAN, PF,) 0.0015 % Dpet ophthalmic dropperette Place 1 drop into the right eye At Bedtime , Historical      vitamin E (TOCOPHEROL) 400 units (180 mg) capsule Take 1 capsule (400 Units) by mouth daily HOLD UNTIL YOU HAVE EGD DONE, No Print Out         STOP taking these medications       cholecalciferol 25 MCG (1000 UT) TABS Comments:   Reason for Stopping:         gabapentin (NEURONTIN) 300 MG capsule Comments:   Reason for Stopping:             Allergies   Allergies   Allergen Reactions     Codeine Anaphylaxis     Chest pressure, shortness of breath     Fentanyl Anaphylaxis     Bee Venom Swelling       Physical Exam:       /75 (BP Location: Left arm)    "Pulse 84   Temp 98  F (36.7  C) (Oral)   Resp 18   Ht 1.651 m (5' 5\")   Wt 47.2 kg (104 lb)   SpO2 97%   BMI 17.31 kg/m    General appearance: alert, appears stated age, cachectic and cooperative  Head: Normocephalic, without obvious abnormality, atraumatic  Eyes: Clear conjuctiva  Neck: no JVD and supple, symmetrical, trachea midline  Lungs: clear to auscultation bilaterally  Heart: regular rate and rhythm, S1, S2 normal, no murmur, click, rub or gallop  Abdomen: soft, non-tender; bowel sounds normal; no masses,  no organomegaly  Extremities: Eva's sign is negative, no sign of DVT  Skin: Skin color, texture, turgor normal. No rashes or lesions  Neurologic: Mental status: Alert, oriented, thought content appropriate  Cranial nerves: normal  Sensory: normal  Motor:grossly normal  Bilateral lower chest wall TTP      "

## 2022-02-22 LAB
ATRIAL RATE - MUSE: 122 BPM
DIASTOLIC BLOOD PRESSURE - MUSE: NORMAL MMHG
INTERPRETATION ECG - MUSE: NORMAL
P AXIS - MUSE: 30 DEGREES
PR INTERVAL - MUSE: 146 MS
QRS DURATION - MUSE: 118 MS
QT - MUSE: 322 MS
QTC - MUSE: 458 MS
R AXIS - MUSE: 105 DEGREES
SYSTOLIC BLOOD PRESSURE - MUSE: NORMAL MMHG
T AXIS - MUSE: -13 DEGREES
VENTRICULAR RATE- MUSE: 122 BPM

## 2022-09-25 ENCOUNTER — HEALTH MAINTENANCE LETTER (OUTPATIENT)
Age: 82
End: 2022-09-25

## 2023-05-13 ENCOUNTER — HEALTH MAINTENANCE LETTER (OUTPATIENT)
Age: 83
End: 2023-05-13

## 2024-07-20 ENCOUNTER — HEALTH MAINTENANCE LETTER (OUTPATIENT)
Age: 84
End: 2024-07-20